# Patient Record
Sex: MALE | Race: WHITE | NOT HISPANIC OR LATINO | Employment: FULL TIME | URBAN - METROPOLITAN AREA
[De-identification: names, ages, dates, MRNs, and addresses within clinical notes are randomized per-mention and may not be internally consistent; named-entity substitution may affect disease eponyms.]

---

## 2018-12-11 ENCOUNTER — TELEPHONE (OUTPATIENT)
Dept: UROLOGY | Facility: CLINIC | Age: 38
End: 2018-12-11

## 2018-12-11 DIAGNOSIS — R35.0 URINARY FREQUENCY: Primary | ICD-10-CM

## 2018-12-11 NOTE — PROGRESS NOTES
"Ordered a urinalysis and urine culture on this patient in case there is any confusion.  No appointment necessary.  Ordered it for tomorrow as 'lab collect".    "

## 2018-12-11 NOTE — TELEPHONE ENCOUNTER
Spoke with pt and instructed him to come to [facility] tomorrow  and submit a urine sample per Dr. Silverio. Voiced understanding

## 2018-12-12 ENCOUNTER — LAB VISIT (OUTPATIENT)
Dept: LAB | Facility: HOSPITAL | Age: 38
End: 2018-12-12
Attending: UROLOGY

## 2018-12-12 DIAGNOSIS — R35.0 URINARY FREQUENCY: ICD-10-CM

## 2018-12-12 LAB
BILIRUB UR QL STRIP: NEGATIVE
CLARITY UR REFRACT.AUTO: CLEAR
COLOR UR AUTO: NORMAL
GLUCOSE UR QL STRIP: NEGATIVE
HGB UR QL STRIP: NEGATIVE
KETONES UR QL STRIP: NEGATIVE
LEUKOCYTE ESTERASE UR QL STRIP: NEGATIVE
NITRITE UR QL STRIP: NEGATIVE
PH UR STRIP: 6 [PH] (ref 5–8)
PROT UR QL STRIP: NEGATIVE
SP GR UR STRIP: 1 (ref 1–1.03)
URN SPEC COLLECT METH UR: NORMAL

## 2018-12-12 PROCEDURE — 87086 URINE CULTURE/COLONY COUNT: CPT

## 2018-12-12 PROCEDURE — 81003 URINALYSIS AUTO W/O SCOPE: CPT

## 2018-12-13 LAB — BACTERIA UR CULT: NO GROWTH

## 2019-12-10 ENCOUNTER — LAB VISIT (OUTPATIENT)
Dept: LAB | Facility: OTHER | Age: 39
End: 2019-12-10
Attending: UROLOGY
Payer: COMMERCIAL

## 2019-12-10 DIAGNOSIS — R31.0 GROSS HEMATURIA: ICD-10-CM

## 2019-12-10 LAB
ANION GAP SERPL CALC-SCNC: 9 MMOL/L (ref 8–16)
BUN SERPL-MCNC: 16 MG/DL (ref 6–20)
CALCIUM SERPL-MCNC: 10 MG/DL (ref 8.7–10.5)
CHLORIDE SERPL-SCNC: 98 MMOL/L (ref 95–110)
CO2 SERPL-SCNC: 32 MMOL/L (ref 23–29)
CREAT SERPL-MCNC: 1.3 MG/DL (ref 0.5–1.4)
EST. GFR  (AFRICAN AMERICAN): >60 ML/MIN/1.73 M^2
EST. GFR  (NON AFRICAN AMERICAN): >60 ML/MIN/1.73 M^2
GLUCOSE SERPL-MCNC: 109 MG/DL (ref 70–110)
POTASSIUM SERPL-SCNC: 3.9 MMOL/L (ref 3.5–5.1)
SODIUM SERPL-SCNC: 139 MMOL/L (ref 136–145)

## 2019-12-10 PROCEDURE — 80048 BASIC METABOLIC PNL TOTAL CA: CPT

## 2019-12-10 PROCEDURE — 36415 COLL VENOUS BLD VENIPUNCTURE: CPT

## 2019-12-10 NOTE — PROGRESS NOTES
Please reach out to this patient and arrange urine culture, cytology, bmp, and CT urogram.  Thank you.  Orders are in.

## 2019-12-11 ENCOUNTER — HOSPITAL ENCOUNTER (OUTPATIENT)
Dept: RADIOLOGY | Facility: OTHER | Age: 39
Discharge: HOME | End: 2019-12-11
Attending: UROLOGY
Payer: COMMERCIAL

## 2019-12-11 DIAGNOSIS — R31.0 GROSS HEMATURIA: ICD-10-CM

## 2019-12-11 PROCEDURE — 74178 CT ABD&PLV WO CNTR FLWD CNTR: CPT | Mod: TC

## 2019-12-11 PROCEDURE — 25500020 PHARM REV CODE 255: Performed by: UROLOGY

## 2019-12-11 PROCEDURE — 74178 CT UROGRAM ABD PELVIS W WO: ICD-10-PCS | Mod: 26,,, | Performed by: RADIOLOGY

## 2019-12-11 PROCEDURE — 74178 CT ABD&PLV WO CNTR FLWD CNTR: CPT | Mod: 26,,, | Performed by: RADIOLOGY

## 2019-12-11 RX ADMIN — IOHEXOL 125 ML: 350 INJECTION, SOLUTION INTRAVENOUS at 03:12

## 2019-12-17 ENCOUNTER — TELEPHONE (OUTPATIENT)
Dept: UROLOGY | Facility: CLINIC | Age: 39
End: 2019-12-17

## 2019-12-17 ENCOUNTER — TELEPHONE (OUTPATIENT)
Dept: UROLOGY | Facility: HOSPITAL | Age: 39
End: 2019-12-17

## 2019-12-17 DIAGNOSIS — R31.0 GROSS HEMATURIA: Primary | ICD-10-CM

## 2019-12-17 NOTE — TELEPHONE ENCOUNTER
attemtped to ontact left message to bring urine specimen to nearest lab and orders were in computer .  Left return clinic number if any questions. tali wills lpn

## 2019-12-17 NOTE — TELEPHONE ENCOUNTER
This patient dropped off a urine specimen in the lab but a urine culture was done without the urine cytology which was ordered.  I'm re-ordering the cytology.  Please arrange with the patient.  THank you.

## 2019-12-19 ENCOUNTER — TELEPHONE (OUTPATIENT)
Dept: UROLOGY | Facility: CLINIC | Age: 39
End: 2019-12-19

## 2019-12-19 NOTE — TELEPHONE ENCOUNTER
----- Message from Paul Fields LPN sent at 12/17/2019 11:50 AM CST -----  MD Jean Claude 1 hour ago (10:40 AM)          This patient dropped off a urine specimen in the lab but a urine culture was done without the urine cytology which was ordered.  I'm re-ordering the cytology.  Please arrange with the patient.  THank you.      Documentation

## 2019-12-19 NOTE — TELEPHONE ENCOUNTER
Spoke with pt instructed to collect urine specimen and bring into lab.  Appears to comprhend instructions.  tali wills lpn

## 2019-12-20 ENCOUNTER — LAB VISIT (OUTPATIENT)
Dept: LAB | Facility: OTHER | Age: 39
End: 2019-12-20
Attending: UROLOGY
Payer: COMMERCIAL

## 2019-12-20 DIAGNOSIS — R31.0 GROSS HEMATURIA: ICD-10-CM

## 2019-12-20 PROCEDURE — 88112 CYTOPATH CELL ENHANCE TECH: CPT | Performed by: PATHOLOGY

## 2019-12-20 PROCEDURE — 88112 CYTOPATH CELL ENHANCE TECH: CPT | Mod: 26,,, | Performed by: PATHOLOGY

## 2019-12-20 PROCEDURE — 88112 PR  CYTOPATH, CELL ENHANCE TECH: ICD-10-PCS | Mod: 26,,, | Performed by: PATHOLOGY

## 2019-12-23 LAB — FINAL PATHOLOGIC DIAGNOSIS: NORMAL

## 2020-01-07 ENCOUNTER — TELEPHONE (OUTPATIENT)
Dept: UROLOGY | Facility: HOSPITAL | Age: 40
End: 2020-01-07

## 2020-01-07 DIAGNOSIS — R31.0 HEMATURIA, GROSS: Primary | ICD-10-CM

## 2020-01-07 RX ORDER — DIAZEPAM 5 MG/1
5 TABLET ORAL ONCE
Qty: 3 TABLET | Refills: 0 | Status: SHIPPED | OUTPATIENT
Start: 2020-01-07 | End: 2020-02-03

## 2020-01-07 NOTE — TELEPHONE ENCOUNTER
"Michael--please schedule Mr. Bae for a cystoscopy on the afternoon of 1/31/20.  This will be in the "resident clinic" procedure block BUT will not be a resident clinic patient.  I will do his cystoscopy.  Ordering valium at patient's request.   "

## 2020-01-17 DIAGNOSIS — Z00.00 ANNUAL PHYSICAL EXAM: Primary | ICD-10-CM

## 2020-01-31 ENCOUNTER — HOSPITAL ENCOUNTER (OUTPATIENT)
Dept: UROLOGY | Facility: HOSPITAL | Age: 40
Discharge: HOME | End: 2020-01-31
Attending: UROLOGY
Payer: COMMERCIAL

## 2020-01-31 VITALS
TEMPERATURE: 100 F | DIASTOLIC BLOOD PRESSURE: 85 MMHG | HEART RATE: 97 BPM | SYSTOLIC BLOOD PRESSURE: 136 MMHG | HEIGHT: 69 IN | RESPIRATION RATE: 18 BRPM | WEIGHT: 171.94 LBS | BODY MASS INDEX: 25.47 KG/M2

## 2020-01-31 DIAGNOSIS — R31.0 HEMATURIA, GROSS: Primary | ICD-10-CM

## 2020-01-31 PROCEDURE — 52000 CYSTOURETHROSCOPY: CPT | Mod: ,,, | Performed by: UROLOGY

## 2020-01-31 PROCEDURE — 52000 CYSTOURETHROSCOPY: CPT

## 2020-01-31 PROCEDURE — 52000 PR CYSTOURETHROSCOPY: ICD-10-PCS | Mod: ,,, | Performed by: UROLOGY

## 2020-01-31 RX ORDER — BUPROPION HYDROCHLORIDE 300 MG/1
300 TABLET ORAL DAILY
COMMUNITY
Start: 2019-12-19

## 2020-01-31 RX ORDER — LIDOCAINE HYDROCHLORIDE 20 MG/ML
JELLY TOPICAL
Status: COMPLETED | OUTPATIENT
Start: 2020-01-31 | End: 2020-01-31

## 2020-01-31 RX ORDER — TESTOSTERONE CYPIONATE 200 MG/ML
150 INJECTION, SOLUTION INTRAMUSCULAR
COMMUNITY

## 2020-01-31 RX ADMIN — LIDOCAINE HYDROCHLORIDE: 20 JELLY TOPICAL at 12:01

## 2020-01-31 NOTE — H&P
[facility]  History & Physical     Subjective:     Chief Complaint/Reason for Admission: hematuria    History of Present Illness:   Patient 39 y.o. male presents with gross hematuria.  Here for cystoscopy.    There are no active problems to display for this patient.         (Not in a hospital admission)  Review of patient's allergies indicates:  No Known Allergies     No past medical history on file.   No past surgical history on file.   No family history on file.   Social History     Tobacco Use    Smoking status: Not on file   Substance Use Topics    Alcohol use: Not on file        Review of Systems  All other systems reviewed and negative except pertinent positives noted in HPI.      Physical Exam   Constitutional: He is oriented to person, place, and time. He appears well-developed and well-nourished. No distress.   HENT:   Head: Normocephalic and atraumatic.   Eyes: No scleral icterus.   Neck: No tracheal deviation present.   Pulmonary/Chest: Effort normal. No respiratory distress.   Neurological: He is alert and oriented to person, place, and time.   Skin: No rash noted.   Psychiatric: He has a normal mood and affect. His behavior is normal. Judgment and thought content normal.   Vitals reviewed.        OBJECTIVE:       Data Review:  CBC: No results for input(s): WBC, RBC, HGB, HCT, PLT, MCV, MCH, MCHC in the last 168 hours.  BMP: No results for input(s): GLU, NA, K, CL, CO2, BUN, CREATININE, CALCIUM, MG in the last 168 hours.  Microbiology Results (last 7 days)     ** No results found for the last 168 hours. **        No results for input(s): COLORU, CLARITYU, SPECGRAV, PHUR, PROTEINUA, GLUCOSEU, BILIRUBINCON, BLOODU, WBCU, RBCU, BACTERIA, MUCUS, NITRITE, LEUKOCYTESUR, UROBILINOGEN, HYALINECASTS in the last 168 hours.    ASSESSMENT/PLAN:     38 yo male with h/o gross hematuria.   Plan:  -I have explained the indication, risks, benefits, and alternatives of the procedure in detail.  The patient  voices understanding and all questions have been answered.  The patient agrees to proceed as planned with cystoscopy.

## 2020-01-31 NOTE — PATIENT INSTRUCTIONS
What to Expect After a Cystoscopy  For the next 24-48 hours, you may feel a mild burning when you urinate. This burning is normal and expected. Drink plenty of water to dilute the urine to help relieve the burning sensation. You may also see a small amount of blood in your urine after the procedure.    Unless you are already taking antibiotics, you may be given an antibiotic after the test to prevent infection.    Signs and Symptoms to Report  Call the [facility] at [phone] if you develop any of the following:  · Fever of 101 degrees or higher  · Chills or persistent bleeding  · Inability to urinate

## 2020-01-31 NOTE — PROCEDURES
Procedures: Flexible cystourethroscopy    Pre Procedure Diagnosis: gross hematuria    Post Procedure Diagnosis:Normal cystoscopy    Surgeon: Jayden Silverio MD    Anesthesia: 2% uro-jet lidocaine jelly for local analgesia    Flexible cysto-urethroscopy was performed after consent was obtained.  The risks and benefits were explained.    2% lidocaine urojet was used for local analgesia.  The genitalia was prepped and draped in the sterile fashion with betadine.    The flexible scope was advanced into the urethra.  No urethral strictures were noted.  The prostate showed No hypertrophy.  There was No median lobe present.  Bilateral ureteral orifices were evaluated and noted to be normal with clear efflux.  The bladder was completely surveyed in a systematic fashion.   No bladder tumors or lesions were seen.      The patient tolerated the procedure well without complication.    They will follow up in 1 year with a microscopic UA.

## 2020-02-03 ENCOUNTER — OFFICE VISIT (OUTPATIENT)
Dept: INTERNAL MEDICINE | Facility: CLINIC | Age: 40
End: 2020-02-03
Payer: COMMERCIAL

## 2020-02-03 ENCOUNTER — HOSPITAL ENCOUNTER (OUTPATIENT)
Dept: RADIOLOGY | Facility: HOSPITAL | Age: 40
Discharge: HOME | End: 2020-02-03
Attending: INTERNAL MEDICINE
Payer: COMMERCIAL

## 2020-02-03 ENCOUNTER — HOSPITAL ENCOUNTER (OUTPATIENT)
Dept: CARDIOLOGY | Facility: CLINIC | Age: 40
Discharge: HOME | End: 2020-02-03
Payer: COMMERCIAL

## 2020-02-03 ENCOUNTER — CLINICAL SUPPORT (OUTPATIENT)
Dept: INTERNAL MEDICINE | Facility: CLINIC | Age: 40
End: 2020-02-03
Payer: COMMERCIAL

## 2020-02-03 DIAGNOSIS — Z00.00 ANNUAL PHYSICAL EXAM: ICD-10-CM

## 2020-02-03 DIAGNOSIS — F32.9 MAJOR DEPRESSION, CHRONIC: Primary | ICD-10-CM

## 2020-02-03 DIAGNOSIS — Z00.00 VISIT FOR ANNUAL HEALTH EXAMINATION: Primary | ICD-10-CM

## 2020-02-03 DIAGNOSIS — R79.89 ELEVATED LFTS: ICD-10-CM

## 2020-02-03 DIAGNOSIS — R93.89 ABNORMAL CXR: ICD-10-CM

## 2020-02-03 DIAGNOSIS — Z00.00 ROUTINE GENERAL MEDICAL EXAMINATION AT A HEALTH CARE FACILITY: Primary | ICD-10-CM

## 2020-02-03 LAB
ALBUMIN SERPL BCP-MCNC: 4.2 G/DL (ref 3.5–5.2)
ALP SERPL-CCNC: 79 U/L (ref 55–135)
ALT SERPL W/O P-5'-P-CCNC: 54 U/L (ref 10–44)
ANION GAP SERPL CALC-SCNC: 11 MMOL/L (ref 8–16)
AST SERPL-CCNC: 92 U/L (ref 10–40)
BILIRUB SERPL-MCNC: 0.2 MG/DL (ref 0.1–1)
BUN SERPL-MCNC: 22 MG/DL (ref 6–20)
CALCIUM SERPL-MCNC: 9.5 MG/DL (ref 8.7–10.5)
CHLORIDE SERPL-SCNC: 102 MMOL/L (ref 95–110)
CHOLEST SERPL-MCNC: 209 MG/DL (ref 120–199)
CHOLEST/HDLC SERPL: 3.4 {RATIO} (ref 2–5)
CO2 SERPL-SCNC: 28 MMOL/L (ref 23–29)
CREAT SERPL-MCNC: 0.9 MG/DL (ref 0.5–1.4)
ERYTHROCYTE [DISTWIDTH] IN BLOOD BY AUTOMATED COUNT: 13.1 % (ref 11.5–14.5)
EST. GFR  (AFRICAN AMERICAN): >60 ML/MIN/1.73 M^2
EST. GFR  (NON AFRICAN AMERICAN): >60 ML/MIN/1.73 M^2
ESTIMATED AVG GLUCOSE: 88 MG/DL (ref 68–131)
GLUCOSE SERPL-MCNC: 88 MG/DL (ref 70–110)
HBA1C MFR BLD HPLC: 4.7 % (ref 4–5.6)
HCT VFR BLD AUTO: 47.8 % (ref 40–54)
HDLC SERPL-MCNC: 61 MG/DL (ref 40–75)
HDLC SERPL: 29.2 % (ref 20–50)
HGB BLD-MCNC: 15.2 G/DL (ref 14–18)
HIV 1+2 AB+HIV1 P24 AG SERPL QL IA: NEGATIVE
LDLC SERPL CALC-MCNC: 133.6 MG/DL (ref 63–159)
MCH RBC QN AUTO: 31.1 PG (ref 27–31)
MCHC RBC AUTO-ENTMCNC: 31.8 G/DL (ref 32–36)
MCV RBC AUTO: 98 FL (ref 82–98)
NONHDLC SERPL-MCNC: 148 MG/DL
PLATELET # BLD AUTO: 261 K/UL (ref 150–350)
PMV BLD AUTO: 9.4 FL (ref 9.2–12.9)
POTASSIUM SERPL-SCNC: 4.4 MMOL/L (ref 3.5–5.1)
PROT SERPL-MCNC: 7.7 G/DL (ref 6–8.4)
RBC # BLD AUTO: 4.89 M/UL (ref 4.6–6.2)
SODIUM SERPL-SCNC: 141 MMOL/L (ref 136–145)
TRIGL SERPL-MCNC: 72 MG/DL (ref 30–150)
WBC # BLD AUTO: 6.29 K/UL (ref 3.9–12.7)

## 2020-02-03 PROCEDURE — 99385 PREV VISIT NEW AGE 18-39: CPT | Mod: S$GLB,,, | Performed by: INTERNAL MEDICINE

## 2020-02-03 PROCEDURE — 80061 LIPID PANEL: CPT

## 2020-02-03 PROCEDURE — 99999 PR PBB SHADOW E&M-EST. PATIENT-LVL III: CPT | Mod: PBBFAC,,, | Performed by: INTERNAL MEDICINE

## 2020-02-03 PROCEDURE — 93005 EKG 12-LEAD: ICD-10-PCS | Mod: S$GLB,,, | Performed by: INTERNAL MEDICINE

## 2020-02-03 PROCEDURE — 99999 PR PBB SHADOW E&M-EST. PATIENT-LVL III: ICD-10-PCS | Mod: PBBFAC,,, | Performed by: INTERNAL MEDICINE

## 2020-02-03 PROCEDURE — 93010 ELECTROCARDIOGRAM REPORT: CPT | Mod: S$GLB,,, | Performed by: INTERNAL MEDICINE

## 2020-02-03 PROCEDURE — 86703 HIV-1/HIV-2 1 RESULT ANTBDY: CPT

## 2020-02-03 PROCEDURE — 71046 X-RAY EXAM CHEST 2 VIEWS: CPT | Mod: TC,FY

## 2020-02-03 PROCEDURE — 36415 COLL VENOUS BLD VENIPUNCTURE: CPT

## 2020-02-03 PROCEDURE — 93005 ELECTROCARDIOGRAM TRACING: CPT | Mod: S$GLB,,, | Performed by: INTERNAL MEDICINE

## 2020-02-03 PROCEDURE — 85027 COMPLETE CBC AUTOMATED: CPT

## 2020-02-03 PROCEDURE — 93010 EKG 12-LEAD: ICD-10-PCS | Mod: S$GLB,,, | Performed by: INTERNAL MEDICINE

## 2020-02-03 PROCEDURE — 83036 HEMOGLOBIN GLYCOSYLATED A1C: CPT

## 2020-02-03 PROCEDURE — 80053 COMPREHEN METABOLIC PANEL: CPT

## 2020-02-03 PROCEDURE — 71046 X-RAY EXAM CHEST 2 VIEWS: CPT | Mod: 26,,, | Performed by: RADIOLOGY

## 2020-02-03 PROCEDURE — 71046 XR CHEST PA AND LATERAL: ICD-10-PCS | Mod: 26,,, | Performed by: RADIOLOGY

## 2020-02-03 PROCEDURE — 99385 PR PREVENTIVE VISIT,NEW,18-39: ICD-10-PCS | Mod: S$GLB,,, | Performed by: INTERNAL MEDICINE

## 2020-02-03 NOTE — LETTER
"February 6, 2020    Jorge Bae III  [address]  [facility] [address]             [facility]  [address]  [facility] [address]  Phone: [phone]  Fax: [phone] Dear Mr. Bae:    Thank you for allowing me to serve you and perform your Executive Health exam on 2/3/2020.  This letter will serve a brief summary of the physical findings, and laboratory/studies performed and recommendations at that time.    Reason for Visit: Executive Health Preventive Physical Examination    /85   Pulse 100   Ht 5' 9" (1.753 m)   Wt 80 kg (176 lb 5.9 oz)   SpO2 97%   BMI 26.05 kg/m²     Labs:  Results for orders placed or performed in visit on 02/03/20   Comprehensive metabolic panel   Result Value Ref Range    Sodium 141 136 - 145 mmol/L    Potassium 4.4 3.5 - 5.1 mmol/L    Chloride 102 95 - 110 mmol/L    CO2 28 23 - 29 mmol/L    Glucose 88 70 - 110 mg/dL    BUN, Bld 22 (H) 6 - 20 mg/dL    Creatinine 0.9 0.5 - 1.4 mg/dL    Calcium 9.5 8.7 - 10.5 mg/dL    Total Protein 7.7 6.0 - 8.4 g/dL    Albumin 4.2 3.5 - 5.2 g/dL    Total Bilirubin 0.2 0.1 - 1.0 mg/dL    Alkaline Phosphatase 79 55 - 135 U/L    AST 92 (H) 10 - 40 U/L    ALT 54 (H) 10 - 44 U/L    Anion Gap 11 8 - 16 mmol/L    eGFR if African American >60.0 >60 mL/min/1.73 m^2    eGFR if non African American >60.0 >60 mL/min/1.73 m^2   CBC Without Differential   Result Value Ref Range    WBC 6.29 3.90 - 12.70 K/uL    RBC 4.89 4.60 - 6.20 M/uL    Hemoglobin 15.2 14.0 - 18.0 g/dL    Hematocrit 47.8 40.0 - 54.0 %    Mean Corpuscular Volume 98 82 - 98 fL    Mean Corpuscular Hemoglobin 31.1 (H) 27.0 - 31.0 pg    Mean Corpuscular Hemoglobin Conc 31.8 (L) 32.0 - 36.0 g/dL    RDW 13.1 11.5 - 14.5 %    Platelets 261 150 - 350 K/uL    MPV 9.4 9.2 - 12.9 fL   Lipid panel   Result Value Ref Range    Cholesterol 209 (H) 120 - 199 mg/dL    Triglycerides 72 30 - 150 mg/dL    HDL 61 40 - 75 mg/dL    LDL Cholesterol 133.6 63.0 - 159.0 mg/dL "    Hdl/Cholesterol Ratio 29.2 20.0 - 50.0 %    Total Cholesterol/HDL Ratio 3.4 2.0 - 5.0    Non-HDL Cholesterol 148 mg/dL   Hemoglobin A1c   Result Value Ref Range    Hemoglobin A1C 4.7 4.0 - 5.6 %    Estimated Avg Glucose 88 68 - 131 mg/dL   HIV 1/2 Ag/Ab (4th Gen)   Result Value Ref Range    HIV 1/2 Ag/Ab Negative Negative        Assessment/Recommendations:  Routine Health Maintenance  Flu and Tdap vaccinations are up-to-date at outside facility per history    At this time, you appear to be in good medical condition.  Your blood pressures were initially elevated on today's visit but improved on repeat manual check, so we will have a close follow-up with you in 3 months to reassess.  Your liver function tests are elevated as discussed at the appointment and will repeat labs in a month.  Your chest x-ray showed some non-specific findings, and I would like to repeat the chest x-ray in one month to reassess.  I look forward to seeing you again at your close follow-up appointment.  Please contact me should you have any questions or concerns regarding physical findings, or my recommendations.    If you have any questions or concerns, please don't hesitate to call.    Sincerely,        Debi Crockett MD

## 2020-02-06 VITALS
DIASTOLIC BLOOD PRESSURE: 85 MMHG | OXYGEN SATURATION: 97 % | HEART RATE: 100 BPM | WEIGHT: 176.38 LBS | BODY MASS INDEX: 26.12 KG/M2 | SYSTOLIC BLOOD PRESSURE: 135 MMHG | HEIGHT: 69 IN

## 2020-02-06 PROBLEM — R79.89 ELEVATED LFTS: Status: ACTIVE | Noted: 2020-02-06

## 2020-02-06 PROBLEM — Z78.9 ALCOHOL USE: Status: ACTIVE | Noted: 2020-02-06

## 2020-02-06 PROBLEM — R93.89 ABNORMAL CXR: Status: ACTIVE | Noted: 2020-02-06

## 2020-02-06 PROBLEM — E34.9 TESTOSTERONE DEFICIENCY: Status: ACTIVE | Noted: 2020-02-06

## 2020-02-06 PROBLEM — F10.90 ALCOHOL USE: Status: ACTIVE | Noted: 2020-02-06

## 2020-02-06 PROBLEM — F32.9 MAJOR DEPRESSION, CHRONIC: Status: ACTIVE | Noted: 2020-02-06

## 2020-07-28 DIAGNOSIS — Z00.6 RESEARCH SUBJECT: ICD-10-CM

## 2020-07-28 DIAGNOSIS — Z00.00 PHYSICAL EXAM, ROUTINE: ICD-10-CM

## 2020-07-28 DIAGNOSIS — Z01.84 ENCOUNTER FOR ANTIBODY RESPONSE EXAMINATION: ICD-10-CM

## 2020-07-28 DIAGNOSIS — Z00.6 RESEARCH SUBJECT: Primary | ICD-10-CM

## 2020-10-04 ENCOUNTER — LAB VISIT (OUTPATIENT)
Dept: URGENT CARE | Facility: CLINIC | Age: 40
End: 2020-10-04
Payer: COMMERCIAL

## 2020-10-04 VITALS — OXYGEN SATURATION: 97 % | TEMPERATURE: 98 F | HEART RATE: 73 BPM

## 2020-10-04 DIAGNOSIS — Z11.59 ENCOUNTER FOR SCREENING FOR OTHER VIRAL DISEASES: ICD-10-CM

## 2020-10-04 LAB — SARS-COV-2 RNA RESP QL NAA+PROBE: NOT DETECTED

## 2020-10-04 PROCEDURE — U0003 INFECTIOUS AGENT DETECTION BY NUCLEIC ACID (DNA OR RNA); SEVERE ACUTE RESPIRATORY SYNDROME CORONAVIRUS 2 (SARS-COV-2) (CORONAVIRUS DISEASE [COVID-19]), AMPLIFIED PROBE TECHNIQUE, MAKING USE OF HIGH THROUGHPUT TECHNOLOGIES AS DESCRIBED BY CMS-2020-01-R: HCPCS

## 2020-11-02 ENCOUNTER — OFFICE VISIT (OUTPATIENT)
Dept: INTERNAL MEDICINE | Facility: CLINIC | Age: 40
End: 2020-11-02
Payer: COMMERCIAL

## 2020-11-02 ENCOUNTER — HOSPITAL ENCOUNTER (OUTPATIENT)
Dept: RADIOLOGY | Facility: HOSPITAL | Age: 40
Discharge: HOME | End: 2020-11-02
Attending: INTERNAL MEDICINE
Payer: COMMERCIAL

## 2020-11-02 VITALS
HEIGHT: 69 IN | SYSTOLIC BLOOD PRESSURE: 120 MMHG | DIASTOLIC BLOOD PRESSURE: 70 MMHG | OXYGEN SATURATION: 96 % | HEART RATE: 76 BPM | BODY MASS INDEX: 25.34 KG/M2 | WEIGHT: 171.06 LBS

## 2020-11-02 DIAGNOSIS — R93.89 ABNORMAL CXR: ICD-10-CM

## 2020-11-02 DIAGNOSIS — R79.89 ELEVATED LFTS: ICD-10-CM

## 2020-11-02 DIAGNOSIS — F32.9 MAJOR DEPRESSION, CHRONIC: ICD-10-CM

## 2020-11-02 DIAGNOSIS — R10.13 EPIGASTRIC ABDOMINAL PAIN: Primary | ICD-10-CM

## 2020-11-02 PROCEDURE — 3008F PR BODY MASS INDEX (BMI) DOCUMENTED: ICD-10-PCS | Mod: CPTII,S$GLB,, | Performed by: INTERNAL MEDICINE

## 2020-11-02 PROCEDURE — 71046 XR CHEST PA AND LATERAL: ICD-10-PCS | Mod: 26,,, | Performed by: RADIOLOGY

## 2020-11-02 PROCEDURE — 71046 X-RAY EXAM CHEST 2 VIEWS: CPT | Mod: TC

## 2020-11-02 PROCEDURE — 99214 PR OFFICE/OUTPT VISIT, EST, LEVL IV, 30-39 MIN: ICD-10-PCS | Mod: S$GLB,,, | Performed by: INTERNAL MEDICINE

## 2020-11-02 PROCEDURE — 71046 X-RAY EXAM CHEST 2 VIEWS: CPT | Mod: 26,,, | Performed by: RADIOLOGY

## 2020-11-02 PROCEDURE — 99214 OFFICE O/P EST MOD 30 MIN: CPT | Mod: S$GLB,,, | Performed by: INTERNAL MEDICINE

## 2020-11-02 PROCEDURE — 99999 PR PBB SHADOW E&M-EST. PATIENT-LVL IV: CPT | Mod: PBBFAC,,, | Performed by: INTERNAL MEDICINE

## 2020-11-02 PROCEDURE — 3008F BODY MASS INDEX DOCD: CPT | Mod: CPTII,S$GLB,, | Performed by: INTERNAL MEDICINE

## 2020-11-02 PROCEDURE — 99999 PR PBB SHADOW E&M-EST. PATIENT-LVL IV: ICD-10-PCS | Mod: PBBFAC,,, | Performed by: INTERNAL MEDICINE

## 2020-11-02 RX ORDER — PANTOPRAZOLE SODIUM 40 MG/1
40 TABLET, DELAYED RELEASE ORAL 2 TIMES DAILY
Qty: 60 TABLET | Refills: 2 | Status: SHIPPED | OUTPATIENT
Start: 2020-11-02 | End: 2022-02-07

## 2020-11-02 RX ORDER — DOXYCYCLINE HYCLATE 100 MG
50 TABLET ORAL DAILY
COMMUNITY
Start: 2020-08-27 | End: 2020-11-02

## 2020-11-02 RX ORDER — DOXYCYCLINE HYCLATE 50 MG/1
50 CAPSULE ORAL DAILY
COMMUNITY
End: 2021-01-08

## 2020-11-11 ENCOUNTER — HOSPITAL ENCOUNTER (OUTPATIENT)
Facility: HOSPITAL | Age: 40
Discharge: HOME | End: 2020-11-11
Attending: INTERNAL MEDICINE | Admitting: INTERNAL MEDICINE
Payer: COMMERCIAL

## 2020-11-11 ENCOUNTER — ANESTHESIA EVENT (OUTPATIENT)
Dept: ENDOSCOPY | Facility: HOSPITAL | Age: 40
End: 2020-11-11
Payer: COMMERCIAL

## 2020-11-11 ENCOUNTER — ANESTHESIA (OUTPATIENT)
Dept: ENDOSCOPY | Facility: HOSPITAL | Age: 40
End: 2020-11-11
Payer: COMMERCIAL

## 2020-11-11 VITALS
OXYGEN SATURATION: 94 % | DIASTOLIC BLOOD PRESSURE: 80 MMHG | SYSTOLIC BLOOD PRESSURE: 111 MMHG | WEIGHT: 163 LBS | TEMPERATURE: 98 F | HEART RATE: 77 BPM | BODY MASS INDEX: 23.34 KG/M2 | RESPIRATION RATE: 18 BRPM | HEIGHT: 70 IN

## 2020-11-11 DIAGNOSIS — R10.9 ABDOMINAL PAIN, UNSPECIFIED ABDOMINAL LOCATION: Primary | ICD-10-CM

## 2020-11-11 DIAGNOSIS — R10.13 EPIGASTRIC PAIN: Primary | ICD-10-CM

## 2020-11-11 LAB — SARS-COV-2 RDRP RESP QL NAA+PROBE: NEGATIVE

## 2020-11-11 PROCEDURE — 63600175 PHARM REV CODE 636 W HCPCS: Performed by: NURSE ANESTHETIST, CERTIFIED REGISTERED

## 2020-11-11 PROCEDURE — 43239 EGD BIOPSY SINGLE/MULTIPLE: CPT | Mod: ,,, | Performed by: INTERNAL MEDICINE

## 2020-11-11 PROCEDURE — 25000003 PHARM REV CODE 250: Performed by: NURSE ANESTHETIST, CERTIFIED REGISTERED

## 2020-11-11 PROCEDURE — 88305 TISSUE EXAM BY PATHOLOGIST: CPT | Mod: 26,,, | Performed by: PATHOLOGY

## 2020-11-11 PROCEDURE — 37000008 HC ANESTHESIA 1ST 15 MINUTES: Performed by: INTERNAL MEDICINE

## 2020-11-11 PROCEDURE — 27201012 HC FORCEPS, HOT/COLD, DISP: Performed by: INTERNAL MEDICINE

## 2020-11-11 PROCEDURE — U0002 COVID-19 LAB TEST NON-CDC: HCPCS

## 2020-11-11 PROCEDURE — 43239 EGD BIOPSY SINGLE/MULTIPLE: CPT | Performed by: INTERNAL MEDICINE

## 2020-11-11 PROCEDURE — 43239 PR EGD, FLEX, W/BIOPSY, SGL/MULTI: ICD-10-PCS | Mod: ,,, | Performed by: INTERNAL MEDICINE

## 2020-11-11 PROCEDURE — 88305 TISSUE EXAM BY PATHOLOGIST: CPT | Mod: 59 | Performed by: PATHOLOGY

## 2020-11-11 PROCEDURE — 88305 TISSUE EXAM BY PATHOLOGIST: ICD-10-PCS | Mod: 26,,, | Performed by: PATHOLOGY

## 2020-11-11 PROCEDURE — 37000009 HC ANESTHESIA EA ADD 15 MINS: Performed by: INTERNAL MEDICINE

## 2020-11-11 RX ORDER — FENTANYL CITRATE 50 UG/ML
INJECTION, SOLUTION INTRAMUSCULAR; INTRAVENOUS
Status: DISCONTINUED | OUTPATIENT
Start: 2020-11-11 | End: 2020-11-11

## 2020-11-11 RX ORDER — MIDAZOLAM HYDROCHLORIDE 1 MG/ML
INJECTION, SOLUTION INTRAMUSCULAR; INTRAVENOUS
Status: DISCONTINUED | OUTPATIENT
Start: 2020-11-11 | End: 2020-11-11

## 2020-11-11 RX ORDER — SODIUM CHLORIDE 9 MG/ML
INJECTION, SOLUTION INTRAVENOUS CONTINUOUS PRN
Status: DISCONTINUED | OUTPATIENT
Start: 2020-11-11 | End: 2020-11-11

## 2020-11-11 RX ORDER — PROPOFOL 10 MG/ML
VIAL (ML) INTRAVENOUS
Status: DISCONTINUED | OUTPATIENT
Start: 2020-11-11 | End: 2020-11-11

## 2020-11-11 RX ORDER — PROPOFOL 10 MG/ML
VIAL (ML) INTRAVENOUS CONTINUOUS PRN
Status: DISCONTINUED | OUTPATIENT
Start: 2020-11-11 | End: 2020-11-11

## 2020-11-11 RX ADMIN — FENTANYL CITRATE 25 MCG: 50 INJECTION, SOLUTION INTRAMUSCULAR; INTRAVENOUS at 11:11

## 2020-11-11 RX ADMIN — FENTANYL CITRATE 50 MCG: 50 INJECTION, SOLUTION INTRAMUSCULAR; INTRAVENOUS at 11:11

## 2020-11-11 RX ADMIN — FENTANYL CITRATE 25 MCG: 50 INJECTION, SOLUTION INTRAMUSCULAR; INTRAVENOUS at 12:11

## 2020-11-11 RX ADMIN — MIDAZOLAM 2 MG: 1 INJECTION INTRAMUSCULAR; INTRAVENOUS at 11:11

## 2020-11-11 RX ADMIN — PROPOFOL 50 MG: 10 INJECTION, EMULSION INTRAVENOUS at 11:11

## 2020-11-11 RX ADMIN — SODIUM CHLORIDE: 0.9 INJECTION, SOLUTION INTRAVENOUS at 11:11

## 2020-11-11 RX ADMIN — TOPICAL ANESTHETIC 1 EACH: 200 SPRAY DENTAL; PERIODONTAL at 11:11

## 2020-11-11 RX ADMIN — PROPOFOL 150 MCG/KG/MIN: 10 INJECTION, EMULSION INTRAVENOUS at 11:11

## 2020-11-11 NOTE — ANESTHESIA POSTPROCEDURE EVALUATION
Anesthesia Post Evaluation    Patient: Jorge Bae III    Procedure(s) Performed: Procedure(s) (LRB):  EGD (ESOPHAGOGASTRODUODENOSCOPY) (N/A)    Final Anesthesia Type: MAC    Patient location during evaluation: GI PACU  Patient participation: Yes- Able to Participate  Level of consciousness: awake and alert  Post-procedure vital signs: reviewed and stable  Pain management: adequate  Airway patency: patent  ITZEL mitigation strategies: Multimodal analgesia  PONV status at discharge: No PONV  Anesthetic complications: no      Cardiovascular status: hemodynamically stable and blood pressure returned to baseline  Respiratory status: room air, unassisted and spontaneous ventilation  Hydration status: euvolemic  Follow-up not needed.          Vitals Value Taken Time   /80 11/11/20 1243   Temp 36.9 °C (98.4 °F) 11/11/20 1215   Pulse 77 11/11/20 1243   Resp 18 11/11/20 1243   SpO2 94 % 11/11/20 1243         Event Time   Out of Recovery 12:52:30         Pain/Henry Score: Henry Score: 10 (11/11/2020 12:43 PM)

## 2020-11-11 NOTE — ANESTHESIA PREPROCEDURE EVALUATION
11/11/2020  Jorge Bae III is a 40 y.o., male for EGD under MAC    Past Medical History:   Diagnosis Date    Depression     Hematuria     Testosterone deficiency      Past Surgical History:   Procedure Laterality Date    deviated septum repair           Anesthesia Evaluation    I have reviewed the Patient Summary Reports.   I have reviewed the NPO Status.   I have reviewed the Medications.     Review of Systems  Anesthesia Hx:   Denies Personal Hx of Anesthesia complications.   Social:  Non-Smoker    Cardiovascular:  Cardiovascular Normal     Pulmonary:  Pulmonary Normal        Physical Exam  General:  Well nourished    Airway/Jaw/Neck:  Airway Findings: Mallampati: II      Chest/Lungs:  Chest/Lungs Clear    Heart/Vascular:  Heart Findings: Normal            Anesthesia Plan  Type of Anesthesia, risks & benefits discussed:  Anesthesia Type:  MAC  Patient's Preference:   Intra-op Monitoring Plan: standard ASA monitors  Intra-op Monitoring Plan Comments:   Post Op Pain Control Plan: multimodal analgesia  Post Op Pain Control Plan Comments:   Induction:    Beta Blocker:  Patient is not currently on a Beta-Blocker (No further documentation required).       Informed Consent: Patient understands risks and agrees with Anesthesia plan.  Questions answered. Anesthesia consent signed with patient.  ASA Score: 1     Day of Surgery Review of History & Physical:            Ready For Surgery From Anesthesia Perspective.

## 2020-11-11 NOTE — [employer] PATIENT INSTRUCTIONS
Discharge Summary/Instructions after an Endoscopic Procedure  Patient Name: Jorge Bae  Patient MRN: [id number]  Patient YOB: 1980 Wednesday, November 11, 2020  Yunior Stevens MD  Your health is very important to us during the Covid Crisis. Following your   procedure today, you will receive a daily text for 2 weeks asking about   signs or symptoms of Covid 19.  Please respond to this text when you   receive it so we can follow up and keep you as safe as possible.   RESTRICTIONS:  During your procedure today, you received medications for sedation.  These   medications may affect your judgment, balance and coordination.  Therefore,   for 24 hours, you have the following restrictions:   - DO NOT drive a car, operate machinery, make legal/financial decisions,   sign important papers or drink alcohol.    ACTIVITY:  Today: no heavy lifting, straining or running due to procedural   sedation/anesthesia.  The following day: return to full activity including work.  DIET:  Eat and drink normally unless instructed otherwise.     TREATMENT FOR COMMON SIDE EFFECTS:  - Mild abdominal pain, nausea, belching, bloating or excessive gas:  rest,   eat lightly and use a heating pad.  - Sore Throat: treat with throat lozenges and/or gargle with warm salt   water.  - Because air was used during the procedure, expelling large amounts of air   from your rectum or belching is normal.  - If a bowel prep was taken, you may not have a bowel movement for 1-3 days.    This is normal.  SYMPTOMS TO WATCH FOR AND REPORT TO YOUR PHYSICIAN:  1. Abdominal pain or bloating, other than gas cramps.  2. Chest pain.  3. Back pain.  4. Signs of infection such as: chills or fever occurring within 24 hours   after the procedure.  5. Rectal bleeding, which would show as bright red, maroon, or black stools.   (A tablespoon of blood from the rectum is not serious, especially if   hemorrhoids are present.)  6. Vomiting.  7. Weakness or  dizziness.  GO DIRECTLY TO THE NEAREST EMERGENCY ROOM IF YOU HAVE ANY OF THE FOLLOWING:      Difficulty breathing              Chills and/or fever over 101 F   Persistent vomiting and/or vomiting blood   Severe abdominal pain   Severe chest pain   Black, tarry stools   Bleeding- more than one tablespoon   Any other symptom or condition that you feel may need urgent attention  Your doctor recommends these additional instructions:  If any biopsies were taken, your doctors clinic will contact you in 1 to 2   weeks with any results.  - Discharge patient to home.   - Patient has a contact number available for emergencies.  The signs and   symptoms of potential delayed complications were discussed with the   patient.  Return to normal activities tomorrow.  Written discharge   instructions were provided to the patient.   - Resume previous diet.   - Continue present medications.   - Await pathology results.   - No aspirin, ibuprofen, naproxen, or other non-steroidal anti-inflammatory   drugs.  For questions, problems or results please call your physician - Yunior Stevens MD.  EMERGENCY PHONE NUMBER: [phone],  LAB RESULTS: [phone]  IF A COMPLICATION OR EMERGENCY SITUATION ARISES AND YOU ARE UNABLE TO REACH   YOUR PHYSICIAN - GO DIRECTLY TO THE EMERGENCY ROOM.  Yunior Stevens MD  11/11/2020 12:11:15 PM  This report has been verified and signed electronically.  [employer]

## 2020-11-11 NOTE — TRANSFER OF CARE
"Anesthesia Transfer of Care Note    Patient: Jorge Bae III    Procedure(s) Performed: Procedure(s) (LRB):  EGD (ESOPHAGOGASTRODUODENOSCOPY) (N/A)    Patient location: GI    Anesthesia Type: general    Transport from OR: Transported from OR on room air with adequate spontaneous ventilation    Post pain: adequate analgesia    Post assessment: no apparent anesthetic complications and tolerated procedure well    Post vital signs: stable    Level of consciousness: alert, oriented and awake    Nausea/Vomiting: no nausea/vomiting    Complications: none    Transfer of care protocol was followed      Last vitals:   Visit Vitals  /72   Pulse 70   Temp 36.6 °C (97.8 °F)   Resp 18   Ht 5' 10" (1.778 m)   Wt 73.9 kg (163 lb)   SpO2 (!) 94%   BMI 23.39 kg/m²     "

## 2020-11-11 NOTE — H&P
Short Stay Endoscopy History and Physical    PCP - Debi Crockett MD     Procedure - EGD  ASA - per anesthesia  Mallampati - per anesthesia  History of Anesthesia problems - no  Family history Anesthesia problems -  no   Plan of anesthesia - General    HPI:  This is a 40 y.o. male here for evaluation of :     Here today for add on EGD for dyspepsia, epigastric pain.  Hx of recent NSAID use daily x 1 month, also recently was on doxy and has reecnt etoh use but has cut back over past 2 weeks.  Pain is nonradiating. Assoc bloating.    ROS:  Constitutional: No fevers, chills, No weight loss  CV: No chest pain  Pulm: No cough, No shortness of breath  Ophtho: No vision changes  GI: see HPI  Derm: No rash    Medical History:  has a past medical history of Depression, Hematuria, and Testosterone deficiency.    Surgical History:  has a past surgical history that includes deviated septum repair.    Family History: family history includes Alzheimer's disease in his mother; Heart disease in his maternal grandfather; No Known Problems in his father.. Otherwise no colon cancer, inflammatory bowel disease, or GI malignancies.    Social History:  reports that he has never smoked. He has never used smokeless tobacco. He reports current alcohol use. He reports previous drug use. Drug: Amphetamines.    Review of patient's allergies indicates:  No Known Allergies    Medications:   Medications Prior to Admission   Medication Sig Dispense Refill Last Dose    buPROPion (WELLBUTRIN XL) 300 MG 24 hr tablet Take 300 mg by mouth once daily.   11/10/2020 at Unknown time    doxycycline (VIBRAMYCIN) 50 MG capsule Take 50 mg by mouth once daily.   11/10/2020 at Unknown time    pantoprazole (PROTONIX) 40 MG tablet Take 1 tablet (40 mg total) by mouth 2 (two) times daily. 60 tablet 2 11/10/2020 at Unknown time    testosterone cypionate (DEPOTESTOTERONE CYPIONATE) 200 mg/mL injection Inject 150 mg into the muscle every 7 days.           Physical Exam:    Vital Signs:   Vitals:    11/11/20 1001   BP: 139/72   Pulse: 70   Resp: 18   Temp: 97.8 °F (36.6 °C)       General Appearance: Well appearing in no acute distress  Eyes:    No scleral icterus  ENT: Neck supple, Lips, mucosa, and tongue normal; teeth and gums normal  Lungs: CTA anteriorly  Heart:  Regular rate, S1, S2 normal, no murmurs heard.  Abdomen: Soft, non tender, non distended with normal bowel sounds. No hepatosplenomegaly, ascites, or mass.  Extremities: No edema  Skin: No rash    Labs:  Lab Results   Component Value Date    WBC 6.29 02/03/2020    HGB 15.2 02/03/2020    HCT 47.8 02/03/2020     02/03/2020    CHOL 209 (H) 02/03/2020    TRIG 72 02/03/2020    HDL 61 02/03/2020    ALT 54 (H) 02/03/2020    AST 92 (H) 02/03/2020     02/03/2020    K 4.4 02/03/2020     02/03/2020    CREATININE 0.9 02/03/2020    BUN 22 (H) 02/03/2020    CO2 28 02/03/2020    HGBA1C 4.7 02/03/2020       I have explained the risks and benefits of endoscopy procedures to the patient including but not limited to bleeding, perforation, infection, and death.  The patient was asked if they understand and allowed to ask any further questions to their satisfaction.      Yunior Stevens MD

## 2020-11-13 LAB
FINAL PATHOLOGIC DIAGNOSIS: NORMAL
GROSS: NORMAL
Lab: NORMAL

## 2020-11-18 ENCOUNTER — PATIENT MESSAGE (OUTPATIENT)
Dept: INTERNAL MEDICINE | Facility: CLINIC | Age: 40
End: 2020-11-18

## 2020-11-18 DIAGNOSIS — Z20.822 EXPOSURE TO COVID-19 VIRUS: Primary | ICD-10-CM

## 2020-11-19 ENCOUNTER — PATIENT MESSAGE (OUTPATIENT)
Dept: INTERNAL MEDICINE | Facility: CLINIC | Age: 40
End: 2020-11-19

## 2020-11-20 ENCOUNTER — LAB VISIT (OUTPATIENT)
Dept: LAB | Facility: HOSPITAL | Age: 40
End: 2020-11-20
Attending: INTERNAL MEDICINE
Payer: COMMERCIAL

## 2020-11-20 ENCOUNTER — PATIENT MESSAGE (OUTPATIENT)
Dept: INTERNAL MEDICINE | Facility: CLINIC | Age: 40
End: 2020-11-20

## 2020-11-20 DIAGNOSIS — R10.13 EPIGASTRIC ABDOMINAL PAIN: ICD-10-CM

## 2020-11-20 DIAGNOSIS — F32.9 MAJOR DEPRESSION, CHRONIC: ICD-10-CM

## 2020-11-20 DIAGNOSIS — Z20.822 EXPOSURE TO COVID-19 VIRUS: ICD-10-CM

## 2020-11-20 DIAGNOSIS — R79.89 ELEVATED LFTS: ICD-10-CM

## 2020-11-20 LAB
ALBUMIN SERPL BCP-MCNC: 4.3 G/DL (ref 3.5–5.2)
ALP SERPL-CCNC: 82 U/L (ref 55–135)
ALT SERPL W/O P-5'-P-CCNC: 49 U/L (ref 10–44)
ANION GAP SERPL CALC-SCNC: 7 MMOL/L (ref 8–16)
AST SERPL-CCNC: 54 U/L (ref 10–40)
BASOPHILS # BLD AUTO: 0.05 K/UL (ref 0–0.2)
BASOPHILS NFR BLD: 0.8 % (ref 0–1.9)
BILIRUB SERPL-MCNC: 0.7 MG/DL (ref 0.1–1)
BUN SERPL-MCNC: 20 MG/DL (ref 6–20)
CALCIUM SERPL-MCNC: 9.3 MG/DL (ref 8.7–10.5)
CHLORIDE SERPL-SCNC: 103 MMOL/L (ref 95–110)
CO2 SERPL-SCNC: 30 MMOL/L (ref 23–29)
CREAT SERPL-MCNC: 1 MG/DL (ref 0.5–1.4)
DIFFERENTIAL METHOD: ABNORMAL
EOSINOPHIL # BLD AUTO: 0.1 K/UL (ref 0–0.5)
EOSINOPHIL NFR BLD: 1.7 % (ref 0–8)
ERYTHROCYTE [DISTWIDTH] IN BLOOD BY AUTOMATED COUNT: 12.9 % (ref 11.5–14.5)
EST. GFR  (AFRICAN AMERICAN): >60 ML/MIN/1.73 M^2
EST. GFR  (NON AFRICAN AMERICAN): >60 ML/MIN/1.73 M^2
GLUCOSE SERPL-MCNC: 97 MG/DL (ref 70–110)
HCT VFR BLD AUTO: 52.5 % (ref 40–54)
HGB BLD-MCNC: 16.3 G/DL (ref 14–18)
IMM GRANULOCYTES # BLD AUTO: 0.02 K/UL (ref 0–0.04)
IMM GRANULOCYTES NFR BLD AUTO: 0.3 % (ref 0–0.5)
LIPASE SERPL-CCNC: 29 U/L (ref 4–60)
LYMPHOCYTES # BLD AUTO: 1.5 K/UL (ref 1–4.8)
LYMPHOCYTES NFR BLD: 22.8 % (ref 18–48)
MCH RBC QN AUTO: 30.1 PG (ref 27–31)
MCHC RBC AUTO-ENTMCNC: 31 G/DL (ref 32–36)
MCV RBC AUTO: 97 FL (ref 82–98)
MONOCYTES # BLD AUTO: 0.5 K/UL (ref 0.3–1)
MONOCYTES NFR BLD: 7.9 % (ref 4–15)
NEUTROPHILS # BLD AUTO: 4.3 K/UL (ref 1.8–7.7)
NEUTROPHILS NFR BLD: 66.5 % (ref 38–73)
NRBC BLD-RTO: 0 /100 WBC
PLATELET # BLD AUTO: 272 K/UL (ref 150–350)
PMV BLD AUTO: 9.9 FL (ref 9.2–12.9)
POTASSIUM SERPL-SCNC: 5 MMOL/L (ref 3.5–5.1)
PROT SERPL-MCNC: 7.5 G/DL (ref 6–8.4)
RBC # BLD AUTO: 5.42 M/UL (ref 4.6–6.2)
SARS-COV-2 IGG SERPLBLD QL IA.RAPID: NEGATIVE
SODIUM SERPL-SCNC: 140 MMOL/L (ref 136–145)
T4 FREE SERPL-MCNC: 0.95 NG/DL (ref 0.71–1.51)
TSH SERPL DL<=0.005 MIU/L-ACNC: 1.98 UIU/ML (ref 0.4–4)
WBC # BLD AUTO: 6.46 K/UL (ref 3.9–12.7)

## 2020-11-20 PROCEDURE — 36415 COLL VENOUS BLD VENIPUNCTURE: CPT

## 2020-11-20 PROCEDURE — 83690 ASSAY OF LIPASE: CPT

## 2020-11-20 PROCEDURE — 84443 ASSAY THYROID STIM HORMONE: CPT

## 2020-11-20 PROCEDURE — 80053 COMPREHEN METABOLIC PANEL: CPT

## 2020-11-20 PROCEDURE — 85025 COMPLETE CBC W/AUTO DIFF WBC: CPT

## 2020-11-20 PROCEDURE — 86769 SARS-COV-2 COVID-19 ANTIBODY: CPT

## 2020-11-20 PROCEDURE — 84439 ASSAY OF FREE THYROXINE: CPT

## 2020-11-20 PROCEDURE — 80074 ACUTE HEPATITIS PANEL: CPT

## 2020-11-23 LAB
HAV IGM SERPL QL IA: NEGATIVE
HBV CORE IGM SERPL QL IA: NEGATIVE
HBV SURFACE AG SERPL QL IA: NEGATIVE
HCV AB SERPL QL IA: NEGATIVE

## 2021-01-02 ENCOUNTER — PATIENT OUTREACH (OUTPATIENT)
Dept: ADMINISTRATIVE | Facility: OTHER | Age: 41
End: 2021-01-02

## 2021-01-08 ENCOUNTER — OFFICE VISIT (OUTPATIENT)
Dept: SPINE | Facility: CLINIC | Age: 41
End: 2021-01-08
Attending: PHYSICAL MEDICINE & REHABILITATION
Payer: COMMERCIAL

## 2021-01-08 VITALS
DIASTOLIC BLOOD PRESSURE: 70 MMHG | SYSTOLIC BLOOD PRESSURE: 132 MMHG | HEART RATE: 68 BPM | BODY MASS INDEX: 23.33 KG/M2 | HEIGHT: 70 IN | WEIGHT: 162.94 LBS

## 2021-01-08 DIAGNOSIS — G89.29 CHRONIC BILATERAL LOW BACK PAIN WITHOUT SCIATICA: Primary | ICD-10-CM

## 2021-01-08 DIAGNOSIS — M54.50 CHRONIC BILATERAL LOW BACK PAIN WITHOUT SCIATICA: Primary | ICD-10-CM

## 2021-01-08 PROCEDURE — 1125F AMNT PAIN NOTED PAIN PRSNT: CPT | Mod: S$GLB,,, | Performed by: PHYSICAL MEDICINE & REHABILITATION

## 2021-01-08 PROCEDURE — 3008F BODY MASS INDEX DOCD: CPT | Mod: CPTII,S$GLB,, | Performed by: PHYSICAL MEDICINE & REHABILITATION

## 2021-01-08 PROCEDURE — 1125F PR PAIN SEVERITY QUANTIFIED, PAIN PRESENT: ICD-10-PCS | Mod: S$GLB,,, | Performed by: PHYSICAL MEDICINE & REHABILITATION

## 2021-01-08 PROCEDURE — 99204 OFFICE O/P NEW MOD 45 MIN: CPT | Mod: S$GLB,,, | Performed by: PHYSICAL MEDICINE & REHABILITATION

## 2021-01-08 PROCEDURE — 99999 PR PBB SHADOW E&M-EST. PATIENT-LVL III: CPT | Mod: PBBFAC,,, | Performed by: PHYSICAL MEDICINE & REHABILITATION

## 2021-01-08 PROCEDURE — 99999 PR PBB SHADOW E&M-EST. PATIENT-LVL III: ICD-10-PCS | Mod: PBBFAC,,, | Performed by: PHYSICAL MEDICINE & REHABILITATION

## 2021-01-08 PROCEDURE — 99204 PR OFFICE/OUTPT VISIT, NEW, LEVL IV, 45-59 MIN: ICD-10-PCS | Mod: S$GLB,,, | Performed by: PHYSICAL MEDICINE & REHABILITATION

## 2021-01-08 PROCEDURE — 3008F PR BODY MASS INDEX (BMI) DOCUMENTED: ICD-10-PCS | Mod: CPTII,S$GLB,, | Performed by: PHYSICAL MEDICINE & REHABILITATION

## 2021-01-08 RX ORDER — DICLOFENAC SODIUM 10 MG/G
4 GEL TOPICAL 4 TIMES DAILY
Qty: 5 TUBE | Refills: 2 | Status: SHIPPED | OUTPATIENT
Start: 2021-01-08 | End: 2021-12-20

## 2021-01-08 RX ORDER — METHOCARBAMOL 500 MG/1
500 TABLET, FILM COATED ORAL 4 TIMES DAILY PRN
Qty: 120 TABLET | Refills: 2 | Status: SHIPPED | OUTPATIENT
Start: 2021-01-08 | End: 2021-08-23

## 2021-01-09 ENCOUNTER — HOSPITAL ENCOUNTER (OUTPATIENT)
Dept: RADIOLOGY | Facility: OTHER | Age: 41
Discharge: HOME | End: 2021-01-09
Attending: PHYSICAL MEDICINE & REHABILITATION
Payer: COMMERCIAL

## 2021-01-09 DIAGNOSIS — M54.50 CHRONIC BILATERAL LOW BACK PAIN WITHOUT SCIATICA: ICD-10-CM

## 2021-01-09 DIAGNOSIS — G89.29 CHRONIC BILATERAL LOW BACK PAIN WITHOUT SCIATICA: ICD-10-CM

## 2021-01-09 PROCEDURE — 72110 XR LUMBAR SPINE 5 VIEW WITH FLEX AND EXT: ICD-10-PCS | Mod: 26,,, | Performed by: RADIOLOGY

## 2021-01-09 PROCEDURE — 72110 X-RAY EXAM L-2 SPINE 4/>VWS: CPT | Mod: 26,,, | Performed by: RADIOLOGY

## 2021-01-09 PROCEDURE — 72110 X-RAY EXAM L-2 SPINE 4/>VWS: CPT | Mod: TC,FY

## 2021-02-05 ENCOUNTER — CLINICAL SUPPORT (OUTPATIENT)
Dept: REHABILITATION | Facility: OTHER | Age: 41
End: 2021-02-05
Attending: PHYSICAL MEDICINE & REHABILITATION
Payer: COMMERCIAL

## 2021-02-05 DIAGNOSIS — R29.3 POOR POSTURE: ICD-10-CM

## 2021-02-05 DIAGNOSIS — R29.898 DECREASED STRENGTH OF TRUNK AND BACK: ICD-10-CM

## 2021-02-05 DIAGNOSIS — G89.29 CHRONIC BILATERAL LOW BACK PAIN WITHOUT SCIATICA: ICD-10-CM

## 2021-02-05 DIAGNOSIS — M25.69 DECREASED RANGE OF MOTION OF TRUNK AND BACK: ICD-10-CM

## 2021-02-05 DIAGNOSIS — M54.50 CHRONIC BILATERAL LOW BACK PAIN WITHOUT SCIATICA: ICD-10-CM

## 2021-02-05 PROCEDURE — 97161 PT EVAL LOW COMPLEX 20 MIN: CPT

## 2021-02-05 PROCEDURE — 97110 THERAPEUTIC EXERCISES: CPT

## 2021-02-08 ENCOUNTER — CLINICAL SUPPORT (OUTPATIENT)
Dept: REHABILITATION | Facility: OTHER | Age: 41
End: 2021-02-08
Attending: PHYSICAL MEDICINE & REHABILITATION
Payer: COMMERCIAL

## 2021-02-08 DIAGNOSIS — R29.898 DECREASED STRENGTH OF TRUNK AND BACK: ICD-10-CM

## 2021-02-08 DIAGNOSIS — R29.3 POOR POSTURE: ICD-10-CM

## 2021-02-08 DIAGNOSIS — M25.69 DECREASED RANGE OF MOTION OF TRUNK AND BACK: Primary | ICD-10-CM

## 2021-02-08 PROCEDURE — 97110 THERAPEUTIC EXERCISES: CPT

## 2021-02-12 ENCOUNTER — CLINICAL SUPPORT (OUTPATIENT)
Dept: URGENT CARE | Facility: CLINIC | Age: 41
End: 2021-02-12
Payer: COMMERCIAL

## 2021-02-12 DIAGNOSIS — Z11.59 ENCOUNTER FOR SCREENING FOR OTHER VIRAL DISEASES: Primary | ICD-10-CM

## 2021-02-12 PROCEDURE — U0003 INFECTIOUS AGENT DETECTION BY NUCLEIC ACID (DNA OR RNA); SEVERE ACUTE RESPIRATORY SYNDROME CORONAVIRUS 2 (SARS-COV-2) (CORONAVIRUS DISEASE [COVID-19]), AMPLIFIED PROBE TECHNIQUE, MAKING USE OF HIGH THROUGHPUT TECHNOLOGIES AS DESCRIBED BY CMS-2020-01-R: HCPCS

## 2021-02-12 PROCEDURE — U0005 INFEC AGEN DETEC AMPLI PROBE: HCPCS

## 2021-02-13 LAB — SARS-COV-2 RNA RESP QL NAA+PROBE: NOT DETECTED

## 2021-03-01 ENCOUNTER — CLINICAL SUPPORT (OUTPATIENT)
Dept: REHABILITATION | Facility: OTHER | Age: 41
End: 2021-03-01
Attending: PHYSICAL MEDICINE & REHABILITATION
Payer: COMMERCIAL

## 2021-03-01 DIAGNOSIS — R29.898 DECREASED STRENGTH OF TRUNK AND BACK: ICD-10-CM

## 2021-03-01 DIAGNOSIS — M25.69 DECREASED RANGE OF MOTION OF TRUNK AND BACK: Primary | ICD-10-CM

## 2021-03-01 DIAGNOSIS — R29.3 POOR POSTURE: ICD-10-CM

## 2021-03-01 PROCEDURE — 97110 THERAPEUTIC EXERCISES: CPT

## 2021-03-08 ENCOUNTER — CLINICAL SUPPORT (OUTPATIENT)
Dept: REHABILITATION | Facility: OTHER | Age: 41
End: 2021-03-08
Attending: PHYSICAL MEDICINE & REHABILITATION
Payer: COMMERCIAL

## 2021-03-08 DIAGNOSIS — M25.69 DECREASED RANGE OF MOTION OF TRUNK AND BACK: Primary | ICD-10-CM

## 2021-03-08 DIAGNOSIS — R29.898 DECREASED STRENGTH OF TRUNK AND BACK: ICD-10-CM

## 2021-03-08 DIAGNOSIS — R29.3 POOR POSTURE: ICD-10-CM

## 2021-03-08 PROCEDURE — 97110 THERAPEUTIC EXERCISES: CPT

## 2021-03-12 ENCOUNTER — CLINICAL SUPPORT (OUTPATIENT)
Dept: REHABILITATION | Facility: OTHER | Age: 41
End: 2021-03-12
Attending: PHYSICAL MEDICINE & REHABILITATION
Payer: COMMERCIAL

## 2021-03-12 DIAGNOSIS — R29.3 POOR POSTURE: ICD-10-CM

## 2021-03-12 DIAGNOSIS — R29.898 DECREASED STRENGTH OF TRUNK AND BACK: ICD-10-CM

## 2021-03-12 DIAGNOSIS — M25.69 DECREASED RANGE OF MOTION OF TRUNK AND BACK: Primary | ICD-10-CM

## 2021-03-12 PROCEDURE — 97110 THERAPEUTIC EXERCISES: CPT | Mod: CQ

## 2021-03-15 ENCOUNTER — CLINICAL SUPPORT (OUTPATIENT)
Dept: REHABILITATION | Facility: OTHER | Age: 41
End: 2021-03-15
Attending: PHYSICAL MEDICINE & REHABILITATION
Payer: COMMERCIAL

## 2021-03-15 DIAGNOSIS — R29.3 POOR POSTURE: ICD-10-CM

## 2021-03-15 DIAGNOSIS — R29.898 DECREASED STRENGTH OF TRUNK AND BACK: ICD-10-CM

## 2021-03-15 DIAGNOSIS — M25.69 DECREASED RANGE OF MOTION OF TRUNK AND BACK: Primary | ICD-10-CM

## 2021-03-15 PROCEDURE — 97110 THERAPEUTIC EXERCISES: CPT

## 2021-03-22 ENCOUNTER — CLINICAL SUPPORT (OUTPATIENT)
Dept: REHABILITATION | Facility: OTHER | Age: 41
End: 2021-03-22
Attending: PHYSICAL MEDICINE & REHABILITATION
Payer: COMMERCIAL

## 2021-03-22 DIAGNOSIS — M25.69 DECREASED RANGE OF MOTION OF TRUNK AND BACK: Primary | ICD-10-CM

## 2021-03-22 DIAGNOSIS — R29.898 DECREASED STRENGTH OF TRUNK AND BACK: ICD-10-CM

## 2021-03-22 DIAGNOSIS — R29.3 POOR POSTURE: ICD-10-CM

## 2021-03-22 PROCEDURE — 97110 THERAPEUTIC EXERCISES: CPT

## 2021-04-01 ENCOUNTER — CLINICAL SUPPORT (OUTPATIENT)
Dept: REHABILITATION | Facility: OTHER | Age: 41
End: 2021-04-01
Attending: PHYSICAL MEDICINE & REHABILITATION
Payer: COMMERCIAL

## 2021-04-01 ENCOUNTER — HOSPITAL ENCOUNTER (OUTPATIENT)
Dept: RADIOLOGY | Facility: OTHER | Age: 41
Discharge: HOME | End: 2021-04-01
Attending: INTERNAL MEDICINE
Payer: COMMERCIAL

## 2021-04-01 DIAGNOSIS — R29.898 DECREASED STRENGTH OF TRUNK AND BACK: ICD-10-CM

## 2021-04-01 DIAGNOSIS — R10.13 EPIGASTRIC ABDOMINAL PAIN: ICD-10-CM

## 2021-04-01 DIAGNOSIS — R29.3 POOR POSTURE: ICD-10-CM

## 2021-04-01 DIAGNOSIS — R79.89 ELEVATED LFTS: ICD-10-CM

## 2021-04-01 DIAGNOSIS — M25.69 DECREASED RANGE OF MOTION OF TRUNK AND BACK: Primary | ICD-10-CM

## 2021-04-01 PROCEDURE — 76705 ECHO EXAM OF ABDOMEN: CPT | Mod: TC

## 2021-04-01 PROCEDURE — 97110 THERAPEUTIC EXERCISES: CPT

## 2021-04-01 PROCEDURE — 76705 US ABDOMEN LIMITED: ICD-10-PCS | Mod: 26,,, | Performed by: RADIOLOGY

## 2021-04-01 PROCEDURE — 76705 ECHO EXAM OF ABDOMEN: CPT | Mod: 26,,, | Performed by: RADIOLOGY

## 2021-04-15 ENCOUNTER — CLINICAL SUPPORT (OUTPATIENT)
Dept: REHABILITATION | Facility: OTHER | Age: 41
End: 2021-04-15
Attending: PHYSICAL MEDICINE & REHABILITATION
Payer: COMMERCIAL

## 2021-04-15 DIAGNOSIS — R29.3 POOR POSTURE: ICD-10-CM

## 2021-04-15 DIAGNOSIS — M25.69 DECREASED RANGE OF MOTION OF TRUNK AND BACK: Primary | ICD-10-CM

## 2021-04-15 DIAGNOSIS — R29.898 DECREASED STRENGTH OF TRUNK AND BACK: ICD-10-CM

## 2021-04-15 PROCEDURE — 97110 THERAPEUTIC EXERCISES: CPT

## 2021-04-19 ENCOUNTER — OFFICE VISIT (OUTPATIENT)
Dept: SPINE | Facility: CLINIC | Age: 41
End: 2021-04-19
Attending: PHYSICAL MEDICINE & REHABILITATION
Payer: COMMERCIAL

## 2021-04-19 ENCOUNTER — CLINICAL SUPPORT (OUTPATIENT)
Dept: REHABILITATION | Facility: OTHER | Age: 41
End: 2021-04-19
Attending: PHYSICAL MEDICINE & REHABILITATION
Payer: COMMERCIAL

## 2021-04-19 VITALS
HEIGHT: 70 IN | WEIGHT: 162.94 LBS | DIASTOLIC BLOOD PRESSURE: 62 MMHG | SYSTOLIC BLOOD PRESSURE: 123 MMHG | HEART RATE: 69 BPM | BODY MASS INDEX: 23.33 KG/M2

## 2021-04-19 DIAGNOSIS — R29.898 DECREASED STRENGTH OF TRUNK AND BACK: ICD-10-CM

## 2021-04-19 DIAGNOSIS — M54.50 CHRONIC BILATERAL LOW BACK PAIN WITHOUT SCIATICA: Primary | ICD-10-CM

## 2021-04-19 DIAGNOSIS — R29.3 POOR POSTURE: ICD-10-CM

## 2021-04-19 DIAGNOSIS — G89.29 CHRONIC BILATERAL LOW BACK PAIN WITHOUT SCIATICA: Primary | ICD-10-CM

## 2021-04-19 DIAGNOSIS — M25.69 DECREASED RANGE OF MOTION OF TRUNK AND BACK: Primary | ICD-10-CM

## 2021-04-19 PROCEDURE — 97110 THERAPEUTIC EXERCISES: CPT

## 2021-04-19 PROCEDURE — 3008F PR BODY MASS INDEX (BMI) DOCUMENTED: ICD-10-PCS | Mod: CPTII,S$GLB,, | Performed by: PHYSICAL MEDICINE & REHABILITATION

## 2021-04-19 PROCEDURE — 99999 PR PBB SHADOW E&M-EST. PATIENT-LVL III: ICD-10-PCS | Mod: PBBFAC,,, | Performed by: PHYSICAL MEDICINE & REHABILITATION

## 2021-04-19 PROCEDURE — 3008F BODY MASS INDEX DOCD: CPT | Mod: CPTII,S$GLB,, | Performed by: PHYSICAL MEDICINE & REHABILITATION

## 2021-04-19 PROCEDURE — 1126F AMNT PAIN NOTED NONE PRSNT: CPT | Mod: S$GLB,,, | Performed by: PHYSICAL MEDICINE & REHABILITATION

## 2021-04-19 PROCEDURE — 1126F PR PAIN SEVERITY QUANTIFIED, NO PAIN PRESENT: ICD-10-PCS | Mod: S$GLB,,, | Performed by: PHYSICAL MEDICINE & REHABILITATION

## 2021-04-19 PROCEDURE — 99214 OFFICE O/P EST MOD 30 MIN: CPT | Mod: S$GLB,,, | Performed by: PHYSICAL MEDICINE & REHABILITATION

## 2021-04-19 PROCEDURE — 99214 PR OFFICE/OUTPT VISIT, EST, LEVL IV, 30-39 MIN: ICD-10-PCS | Mod: S$GLB,,, | Performed by: PHYSICAL MEDICINE & REHABILITATION

## 2021-04-19 PROCEDURE — 99999 PR PBB SHADOW E&M-EST. PATIENT-LVL III: CPT | Mod: PBBFAC,,, | Performed by: PHYSICAL MEDICINE & REHABILITATION

## 2021-04-26 ENCOUNTER — CLINICAL SUPPORT (OUTPATIENT)
Dept: REHABILITATION | Facility: OTHER | Age: 41
End: 2021-04-26
Attending: PHYSICAL MEDICINE & REHABILITATION
Payer: COMMERCIAL

## 2021-04-26 DIAGNOSIS — R29.3 POOR POSTURE: ICD-10-CM

## 2021-04-26 DIAGNOSIS — M25.69 DECREASED RANGE OF MOTION OF TRUNK AND BACK: Primary | ICD-10-CM

## 2021-04-26 DIAGNOSIS — R29.898 DECREASED STRENGTH OF TRUNK AND BACK: ICD-10-CM

## 2021-04-26 PROCEDURE — 97110 THERAPEUTIC EXERCISES: CPT | Mod: CQ

## 2021-04-30 ENCOUNTER — CLINICAL SUPPORT (OUTPATIENT)
Dept: REHABILITATION | Facility: OTHER | Age: 41
End: 2021-04-30
Attending: PHYSICAL MEDICINE & REHABILITATION
Payer: COMMERCIAL

## 2021-04-30 DIAGNOSIS — M25.69 DECREASED RANGE OF MOTION OF TRUNK AND BACK: Primary | ICD-10-CM

## 2021-04-30 DIAGNOSIS — R29.3 POOR POSTURE: ICD-10-CM

## 2021-04-30 DIAGNOSIS — R29.898 DECREASED STRENGTH OF TRUNK AND BACK: ICD-10-CM

## 2021-04-30 PROCEDURE — 97110 THERAPEUTIC EXERCISES: CPT

## 2021-05-03 ENCOUNTER — CLINICAL SUPPORT (OUTPATIENT)
Dept: REHABILITATION | Facility: OTHER | Age: 41
End: 2021-05-03
Attending: PHYSICAL MEDICINE & REHABILITATION
Payer: COMMERCIAL

## 2021-05-03 DIAGNOSIS — R29.3 POOR POSTURE: ICD-10-CM

## 2021-05-03 DIAGNOSIS — M25.69 DECREASED RANGE OF MOTION OF TRUNK AND BACK: Primary | ICD-10-CM

## 2021-05-03 DIAGNOSIS — R29.898 DECREASED STRENGTH OF TRUNK AND BACK: ICD-10-CM

## 2021-05-03 PROCEDURE — 97110 THERAPEUTIC EXERCISES: CPT | Mod: CQ

## 2021-05-07 ENCOUNTER — CLINICAL SUPPORT (OUTPATIENT)
Dept: REHABILITATION | Facility: OTHER | Age: 41
End: 2021-05-07
Attending: PHYSICAL MEDICINE & REHABILITATION
Payer: COMMERCIAL

## 2021-05-07 DIAGNOSIS — R29.3 POOR POSTURE: ICD-10-CM

## 2021-05-07 DIAGNOSIS — R29.898 DECREASED STRENGTH OF TRUNK AND BACK: ICD-10-CM

## 2021-05-07 DIAGNOSIS — M25.69 DECREASED RANGE OF MOTION OF TRUNK AND BACK: Primary | ICD-10-CM

## 2021-05-07 PROCEDURE — 97110 THERAPEUTIC EXERCISES: CPT

## 2021-05-10 ENCOUNTER — CLINICAL SUPPORT (OUTPATIENT)
Dept: REHABILITATION | Facility: OTHER | Age: 41
End: 2021-05-10
Attending: PHYSICAL MEDICINE & REHABILITATION
Payer: COMMERCIAL

## 2021-05-10 DIAGNOSIS — M25.69 DECREASED RANGE OF MOTION OF TRUNK AND BACK: Primary | ICD-10-CM

## 2021-05-10 DIAGNOSIS — R29.898 DECREASED STRENGTH OF TRUNK AND BACK: ICD-10-CM

## 2021-05-10 DIAGNOSIS — R29.3 POOR POSTURE: ICD-10-CM

## 2021-05-10 PROCEDURE — 97110 THERAPEUTIC EXERCISES: CPT

## 2021-05-14 ENCOUNTER — CLINICAL SUPPORT (OUTPATIENT)
Dept: REHABILITATION | Facility: OTHER | Age: 41
End: 2021-05-14
Attending: PHYSICAL MEDICINE & REHABILITATION
Payer: COMMERCIAL

## 2021-05-14 DIAGNOSIS — R29.898 DECREASED STRENGTH OF TRUNK AND BACK: ICD-10-CM

## 2021-05-14 DIAGNOSIS — M25.69 DECREASED RANGE OF MOTION OF TRUNK AND BACK: Primary | ICD-10-CM

## 2021-05-14 DIAGNOSIS — R29.3 POOR POSTURE: ICD-10-CM

## 2021-05-14 PROCEDURE — 97110 THERAPEUTIC EXERCISES: CPT

## 2021-05-17 ENCOUNTER — CLINICAL SUPPORT (OUTPATIENT)
Dept: REHABILITATION | Facility: OTHER | Age: 41
End: 2021-05-17
Attending: PHYSICAL MEDICINE & REHABILITATION
Payer: COMMERCIAL

## 2021-05-17 DIAGNOSIS — R29.898 DECREASED STRENGTH OF TRUNK AND BACK: ICD-10-CM

## 2021-05-17 DIAGNOSIS — R29.3 POOR POSTURE: ICD-10-CM

## 2021-05-17 DIAGNOSIS — M25.69 DECREASED RANGE OF MOTION OF TRUNK AND BACK: Primary | ICD-10-CM

## 2021-05-17 PROCEDURE — 97110 THERAPEUTIC EXERCISES: CPT

## 2021-05-21 ENCOUNTER — CLINICAL SUPPORT (OUTPATIENT)
Dept: REHABILITATION | Facility: OTHER | Age: 41
End: 2021-05-21
Attending: PHYSICAL MEDICINE & REHABILITATION
Payer: COMMERCIAL

## 2021-05-21 DIAGNOSIS — M25.69 DECREASED RANGE OF MOTION OF TRUNK AND BACK: Primary | ICD-10-CM

## 2021-05-21 DIAGNOSIS — R29.898 DECREASED STRENGTH OF TRUNK AND BACK: ICD-10-CM

## 2021-05-21 DIAGNOSIS — R29.3 POOR POSTURE: ICD-10-CM

## 2021-05-21 PROCEDURE — 97110 THERAPEUTIC EXERCISES: CPT

## 2021-05-27 ENCOUNTER — PATIENT MESSAGE (OUTPATIENT)
Dept: INTERNAL MEDICINE | Facility: CLINIC | Age: 41
End: 2021-05-27

## 2021-05-28 ENCOUNTER — PATIENT MESSAGE (OUTPATIENT)
Dept: INTERNAL MEDICINE | Facility: CLINIC | Age: 41
End: 2021-05-28

## 2021-05-28 ENCOUNTER — OFFICE VISIT (OUTPATIENT)
Dept: INTERNAL MEDICINE | Facility: CLINIC | Age: 41
End: 2021-05-28
Payer: COMMERCIAL

## 2021-05-28 ENCOUNTER — HOSPITAL ENCOUNTER (OUTPATIENT)
Dept: RADIOLOGY | Facility: HOSPITAL | Age: 41
Discharge: HOME | End: 2021-05-28
Attending: NURSE PRACTITIONER
Payer: COMMERCIAL

## 2021-05-28 VITALS
SYSTOLIC BLOOD PRESSURE: 124 MMHG | WEIGHT: 161.19 LBS | DIASTOLIC BLOOD PRESSURE: 86 MMHG | HEIGHT: 69 IN | BODY MASS INDEX: 23.87 KG/M2 | OXYGEN SATURATION: 99 % | HEART RATE: 70 BPM

## 2021-05-28 DIAGNOSIS — R00.2 PALPITATION: ICD-10-CM

## 2021-05-28 DIAGNOSIS — R61 NIGHT SWEATS: ICD-10-CM

## 2021-05-28 DIAGNOSIS — R00.2 PALPITATION: Primary | ICD-10-CM

## 2021-05-28 DIAGNOSIS — R00.0 TACHYCARDIA: ICD-10-CM

## 2021-05-28 PROCEDURE — 99214 PR OFFICE/OUTPT VISIT, EST, LEVL IV, 30-39 MIN: ICD-10-PCS | Mod: S$GLB,,, | Performed by: NURSE PRACTITIONER

## 2021-05-28 PROCEDURE — 71046 XR CHEST PA AND LATERAL: ICD-10-PCS | Mod: 26,,, | Performed by: RADIOLOGY

## 2021-05-28 PROCEDURE — 3008F PR BODY MASS INDEX (BMI) DOCUMENTED: ICD-10-PCS | Mod: CPTII,S$GLB,, | Performed by: NURSE PRACTITIONER

## 2021-05-28 PROCEDURE — 1126F AMNT PAIN NOTED NONE PRSNT: CPT | Mod: S$GLB,,, | Performed by: NURSE PRACTITIONER

## 2021-05-28 PROCEDURE — 3008F BODY MASS INDEX DOCD: CPT | Mod: CPTII,S$GLB,, | Performed by: NURSE PRACTITIONER

## 2021-05-28 PROCEDURE — 99999 PR PBB SHADOW E&M-EST. PATIENT-LVL III: ICD-10-PCS | Mod: PBBFAC,,, | Performed by: NURSE PRACTITIONER

## 2021-05-28 PROCEDURE — 1126F PR PAIN SEVERITY QUANTIFIED, NO PAIN PRESENT: ICD-10-PCS | Mod: S$GLB,,, | Performed by: NURSE PRACTITIONER

## 2021-05-28 PROCEDURE — 93005 EKG 12-LEAD: ICD-10-PCS | Mod: S$GLB,,, | Performed by: NURSE PRACTITIONER

## 2021-05-28 PROCEDURE — 93005 ELECTROCARDIOGRAM TRACING: CPT | Mod: S$GLB,,, | Performed by: NURSE PRACTITIONER

## 2021-05-28 PROCEDURE — 99214 OFFICE O/P EST MOD 30 MIN: CPT | Mod: S$GLB,,, | Performed by: NURSE PRACTITIONER

## 2021-05-28 PROCEDURE — 93010 ELECTROCARDIOGRAM REPORT: CPT | Mod: S$GLB,,, | Performed by: INTERNAL MEDICINE

## 2021-05-28 PROCEDURE — 71046 X-RAY EXAM CHEST 2 VIEWS: CPT | Mod: 26,,, | Performed by: RADIOLOGY

## 2021-05-28 PROCEDURE — 93010 EKG 12-LEAD: ICD-10-PCS | Mod: S$GLB,,, | Performed by: INTERNAL MEDICINE

## 2021-05-28 PROCEDURE — 99999 PR PBB SHADOW E&M-EST. PATIENT-LVL III: CPT | Mod: PBBFAC,,, | Performed by: NURSE PRACTITIONER

## 2021-05-28 PROCEDURE — 71046 X-RAY EXAM CHEST 2 VIEWS: CPT | Mod: TC

## 2021-05-28 RX ORDER — ONDANSETRON 4 MG/1
4 TABLET, ORALLY DISINTEGRATING ORAL EVERY 6 HOURS PRN
COMMUNITY
Start: 2021-02-15

## 2021-05-31 ENCOUNTER — CLINICAL SUPPORT (OUTPATIENT)
Dept: REHABILITATION | Facility: OTHER | Age: 41
End: 2021-05-31
Attending: PHYSICAL MEDICINE & REHABILITATION
Payer: COMMERCIAL

## 2021-05-31 DIAGNOSIS — R29.3 POOR POSTURE: ICD-10-CM

## 2021-05-31 DIAGNOSIS — R29.898 DECREASED STRENGTH OF TRUNK AND BACK: ICD-10-CM

## 2021-05-31 DIAGNOSIS — M25.69 DECREASED RANGE OF MOTION OF TRUNK AND BACK: Primary | ICD-10-CM

## 2021-05-31 PROCEDURE — 97110 THERAPEUTIC EXERCISES: CPT

## 2021-06-04 ENCOUNTER — CLINICAL SUPPORT (OUTPATIENT)
Dept: REHABILITATION | Facility: OTHER | Age: 41
End: 2021-06-04
Attending: PHYSICAL MEDICINE & REHABILITATION
Payer: COMMERCIAL

## 2021-06-04 DIAGNOSIS — M50.30 DDD (DEGENERATIVE DISC DISEASE), CERVICAL: Primary | ICD-10-CM

## 2021-06-04 PROCEDURE — 97110 THERAPEUTIC EXERCISES: CPT

## 2021-06-04 PROCEDURE — 97750 PHYSICAL PERFORMANCE TEST: CPT

## 2021-08-25 ENCOUNTER — HOSPITAL ENCOUNTER (OUTPATIENT)
Dept: RADIOLOGY | Facility: HOSPITAL | Age: 41
Discharge: HOME | End: 2021-08-25
Attending: ORTHOPAEDIC SURGERY
Payer: COMMERCIAL

## 2021-08-25 ENCOUNTER — TELEPHONE (OUTPATIENT)
Dept: ORTHOPEDICS | Facility: CLINIC | Age: 41
End: 2021-08-25

## 2021-08-25 ENCOUNTER — OFFICE VISIT (OUTPATIENT)
Dept: ORTHOPEDICS | Facility: CLINIC | Age: 41
End: 2021-08-25
Payer: COMMERCIAL

## 2021-08-25 VITALS
BODY MASS INDEX: 23.8 KG/M2 | HEART RATE: 74 BPM | DIASTOLIC BLOOD PRESSURE: 71 MMHG | HEIGHT: 69 IN | WEIGHT: 160.69 LBS | SYSTOLIC BLOOD PRESSURE: 123 MMHG

## 2021-08-25 DIAGNOSIS — M25.511 ACUTE PAIN OF BOTH SHOULDERS: ICD-10-CM

## 2021-08-25 DIAGNOSIS — M19.011 ARTHRITIS OF RIGHT ACROMIOCLAVICULAR JOINT: Primary | ICD-10-CM

## 2021-08-25 DIAGNOSIS — M25.511 ACUTE PAIN OF BOTH SHOULDERS: Primary | ICD-10-CM

## 2021-08-25 DIAGNOSIS — M25.512 ACUTE PAIN OF BOTH SHOULDERS: Primary | ICD-10-CM

## 2021-08-25 DIAGNOSIS — M25.512 ACUTE PAIN OF BOTH SHOULDERS: ICD-10-CM

## 2021-08-25 DIAGNOSIS — M25.512 ACUTE PAIN OF LEFT SHOULDER: ICD-10-CM

## 2021-08-25 PROCEDURE — 3074F SYST BP LT 130 MM HG: CPT | Mod: CPTII,S$GLB,, | Performed by: ORTHOPAEDIC SURGERY

## 2021-08-25 PROCEDURE — 1125F PR PAIN SEVERITY QUANTIFIED, PAIN PRESENT: ICD-10-PCS | Mod: CPTII,S$GLB,, | Performed by: ORTHOPAEDIC SURGERY

## 2021-08-25 PROCEDURE — 99999 PR PBB SHADOW E&M-EST. PATIENT-LVL III: ICD-10-PCS | Mod: PBBFAC,,, | Performed by: ORTHOPAEDIC SURGERY

## 2021-08-25 PROCEDURE — 3008F PR BODY MASS INDEX (BMI) DOCUMENTED: ICD-10-PCS | Mod: CPTII,S$GLB,, | Performed by: ORTHOPAEDIC SURGERY

## 2021-08-25 PROCEDURE — 99204 OFFICE O/P NEW MOD 45 MIN: CPT | Mod: 25,S$GLB,, | Performed by: ORTHOPAEDIC SURGERY

## 2021-08-25 PROCEDURE — 3008F BODY MASS INDEX DOCD: CPT | Mod: CPTII,S$GLB,, | Performed by: ORTHOPAEDIC SURGERY

## 2021-08-25 PROCEDURE — 1159F PR MEDICATION LIST DOCUMENTED IN MEDICAL RECORD: ICD-10-PCS | Mod: CPTII,S$GLB,, | Performed by: ORTHOPAEDIC SURGERY

## 2021-08-25 PROCEDURE — 1125F AMNT PAIN NOTED PAIN PRSNT: CPT | Mod: CPTII,S$GLB,, | Performed by: ORTHOPAEDIC SURGERY

## 2021-08-25 PROCEDURE — 99204 PR OFFICE/OUTPT VISIT, NEW, LEVL IV, 45-59 MIN: ICD-10-PCS | Mod: 25,S$GLB,, | Performed by: ORTHOPAEDIC SURGERY

## 2021-08-25 PROCEDURE — 1159F MED LIST DOCD IN RCRD: CPT | Mod: CPTII,S$GLB,, | Performed by: ORTHOPAEDIC SURGERY

## 2021-08-25 PROCEDURE — 3078F DIAST BP <80 MM HG: CPT | Mod: CPTII,S$GLB,, | Performed by: ORTHOPAEDIC SURGERY

## 2021-08-25 PROCEDURE — 3078F PR MOST RECENT DIASTOLIC BLOOD PRESSURE < 80 MM HG: ICD-10-PCS | Mod: CPTII,S$GLB,, | Performed by: ORTHOPAEDIC SURGERY

## 2021-08-25 PROCEDURE — 20605 DRAIN/INJ JOINT/BURSA W/O US: CPT | Mod: RT,S$GLB,, | Performed by: ORTHOPAEDIC SURGERY

## 2021-08-25 PROCEDURE — 3074F PR MOST RECENT SYSTOLIC BLOOD PRESSURE < 130 MM HG: ICD-10-PCS | Mod: CPTII,S$GLB,, | Performed by: ORTHOPAEDIC SURGERY

## 2021-08-25 PROCEDURE — 99999 PR PBB SHADOW E&M-EST. PATIENT-LVL III: CPT | Mod: PBBFAC,,, | Performed by: ORTHOPAEDIC SURGERY

## 2021-08-25 PROCEDURE — 20605 INTERMEDIATE JOINT ASPIRATION/INJECTION: R ACROMIOCLAVICULAR: ICD-10-PCS | Mod: RT,S$GLB,, | Performed by: ORTHOPAEDIC SURGERY

## 2021-08-25 PROCEDURE — 73030 X-RAY EXAM OF SHOULDER: CPT | Mod: 26,,, | Performed by: RADIOLOGY

## 2021-08-25 PROCEDURE — 73030 X-RAY EXAM OF SHOULDER: CPT | Mod: TC,50,FY

## 2021-08-25 PROCEDURE — 73030 XR SHOULDER COMPLETE 2 OR MORE VIEWS BILATERAL: ICD-10-PCS | Mod: 26,,, | Performed by: RADIOLOGY

## 2021-08-25 RX ADMIN — TRIAMCINOLONE ACETONIDE 40 MG: 40 INJECTION, SUSPENSION INTRA-ARTICULAR; INTRAMUSCULAR at 02:08

## 2021-08-27 RX ORDER — TRIAMCINOLONE ACETONIDE 40 MG/ML
40 INJECTION, SUSPENSION INTRA-ARTICULAR; INTRAMUSCULAR
Status: DISCONTINUED | OUTPATIENT
Start: 2021-08-25 | End: 2021-08-27 | Stop reason: HOSPADM

## 2021-10-27 ENCOUNTER — TELEPHONE (OUTPATIENT)
Dept: PAIN MEDICINE | Facility: CLINIC | Age: 41
End: 2021-10-27
Payer: COMMERCIAL

## 2021-10-28 ENCOUNTER — OFFICE VISIT (OUTPATIENT)
Dept: PAIN MEDICINE | Facility: CLINIC | Age: 41
End: 2021-10-28
Attending: ANESTHESIOLOGY
Payer: COMMERCIAL

## 2021-10-28 ENCOUNTER — PATIENT MESSAGE (OUTPATIENT)
Dept: PAIN MEDICINE | Facility: CLINIC | Age: 41
End: 2021-10-28

## 2021-10-28 VITALS
TEMPERATURE: 98 F | WEIGHT: 162.94 LBS | HEIGHT: 69 IN | DIASTOLIC BLOOD PRESSURE: 80 MMHG | RESPIRATION RATE: 19 BRPM | BODY MASS INDEX: 24.13 KG/M2 | SYSTOLIC BLOOD PRESSURE: 124 MMHG | HEART RATE: 66 BPM

## 2021-10-28 DIAGNOSIS — M46.1 SACROILIITIS: ICD-10-CM

## 2021-10-28 DIAGNOSIS — M47.9 OSTEOARTHRITIS OF SPINE, UNSPECIFIED SPINAL OSTEOARTHRITIS COMPLICATION STATUS, UNSPECIFIED SPINAL REGION: Primary | ICD-10-CM

## 2021-10-28 DIAGNOSIS — M47.819 SPONDYLOSIS WITHOUT MYELOPATHY OR RADICULOPATHY: ICD-10-CM

## 2021-10-28 PROCEDURE — 1160F PR REVIEW ALL MEDS BY PRESCRIBER/CLIN PHARMACIST DOCUMENTED: ICD-10-PCS | Mod: CPTII,S$GLB,, | Performed by: ANESTHESIOLOGY

## 2021-10-28 PROCEDURE — 1159F PR MEDICATION LIST DOCUMENTED IN MEDICAL RECORD: ICD-10-PCS | Mod: CPTII,S$GLB,, | Performed by: ANESTHESIOLOGY

## 2021-10-28 PROCEDURE — 3079F PR MOST RECENT DIASTOLIC BLOOD PRESSURE 80-89 MM HG: ICD-10-PCS | Mod: CPTII,S$GLB,, | Performed by: ANESTHESIOLOGY

## 2021-10-28 PROCEDURE — 99999 PR PBB SHADOW E&M-EST. PATIENT-LVL III: ICD-10-PCS | Mod: PBBFAC,,, | Performed by: ANESTHESIOLOGY

## 2021-10-28 PROCEDURE — 1159F MED LIST DOCD IN RCRD: CPT | Mod: CPTII,S$GLB,, | Performed by: ANESTHESIOLOGY

## 2021-10-28 PROCEDURE — 99204 OFFICE O/P NEW MOD 45 MIN: CPT | Mod: S$GLB,,, | Performed by: ANESTHESIOLOGY

## 2021-10-28 PROCEDURE — 3074F PR MOST RECENT SYSTOLIC BLOOD PRESSURE < 130 MM HG: ICD-10-PCS | Mod: CPTII,S$GLB,, | Performed by: ANESTHESIOLOGY

## 2021-10-28 PROCEDURE — 3079F DIAST BP 80-89 MM HG: CPT | Mod: CPTII,S$GLB,, | Performed by: ANESTHESIOLOGY

## 2021-10-28 PROCEDURE — 99999 PR PBB SHADOW E&M-EST. PATIENT-LVL III: CPT | Mod: PBBFAC,,, | Performed by: ANESTHESIOLOGY

## 2021-10-28 PROCEDURE — 1160F RVW MEDS BY RX/DR IN RCRD: CPT | Mod: CPTII,S$GLB,, | Performed by: ANESTHESIOLOGY

## 2021-10-28 PROCEDURE — 3008F PR BODY MASS INDEX (BMI) DOCUMENTED: ICD-10-PCS | Mod: CPTII,S$GLB,, | Performed by: ANESTHESIOLOGY

## 2021-10-28 PROCEDURE — 3074F SYST BP LT 130 MM HG: CPT | Mod: CPTII,S$GLB,, | Performed by: ANESTHESIOLOGY

## 2021-10-28 PROCEDURE — 99204 PR OFFICE/OUTPT VISIT, NEW, LEVL IV, 45-59 MIN: ICD-10-PCS | Mod: S$GLB,,, | Performed by: ANESTHESIOLOGY

## 2021-10-28 PROCEDURE — 3008F BODY MASS INDEX DOCD: CPT | Mod: CPTII,S$GLB,, | Performed by: ANESTHESIOLOGY

## 2021-11-02 ENCOUNTER — TELEPHONE (OUTPATIENT)
Dept: PAIN MEDICINE | Facility: CLINIC | Age: 41
End: 2021-11-02
Payer: COMMERCIAL

## 2021-11-03 ENCOUNTER — HOSPITAL ENCOUNTER (OUTPATIENT)
Facility: OTHER | Age: 41
Discharge: HOME | End: 2021-11-03
Attending: ANESTHESIOLOGY | Admitting: ANESTHESIOLOGY
Payer: COMMERCIAL

## 2021-11-03 VITALS
SYSTOLIC BLOOD PRESSURE: 113 MMHG | OXYGEN SATURATION: 95 % | WEIGHT: 160 LBS | HEIGHT: 69 IN | TEMPERATURE: 98 F | DIASTOLIC BLOOD PRESSURE: 59 MMHG | BODY MASS INDEX: 23.7 KG/M2 | HEART RATE: 66 BPM | RESPIRATION RATE: 14 BRPM

## 2021-11-03 DIAGNOSIS — M47.816 LUMBAR SPONDYLOSIS: Primary | ICD-10-CM

## 2021-11-03 DIAGNOSIS — G89.29 CHRONIC PAIN: ICD-10-CM

## 2021-11-03 PROCEDURE — 64493 INJ PARAVERT F JNT L/S 1 LEV: CPT | Mod: RT,,, | Performed by: ANESTHESIOLOGY

## 2021-11-03 PROCEDURE — 64493 PR INJ DX/THER AGNT PARAVERT FACET JOINT,IMG GUIDE,LUMBAR/SAC,1ST LVL: ICD-10-PCS | Mod: RT,,, | Performed by: ANESTHESIOLOGY

## 2021-11-03 PROCEDURE — 64494 PR INJ DX/THER AGNT PARAVERT FACET JOINT,IMG GUIDE,LUMBAR/SAC, 2ND LEVEL: ICD-10-PCS | Mod: RT,,, | Performed by: ANESTHESIOLOGY

## 2021-11-03 PROCEDURE — 64494 INJ PARAVERT F JNT L/S 2 LEV: CPT | Mod: RT | Performed by: ANESTHESIOLOGY

## 2021-11-03 PROCEDURE — 64494 INJ PARAVERT F JNT L/S 2 LEV: CPT | Mod: RT,,, | Performed by: ANESTHESIOLOGY

## 2021-11-03 PROCEDURE — 64493 INJ PARAVERT F JNT L/S 1 LEV: CPT | Mod: RT | Performed by: ANESTHESIOLOGY

## 2021-11-03 PROCEDURE — 25500020 PHARM REV CODE 255: Performed by: ANESTHESIOLOGY

## 2021-11-03 PROCEDURE — 25000003 PHARM REV CODE 250: Performed by: ANESTHESIOLOGY

## 2021-11-03 PROCEDURE — 63600175 PHARM REV CODE 636 W HCPCS: Performed by: ANESTHESIOLOGY

## 2021-11-03 PROCEDURE — 25000003 PHARM REV CODE 250: Performed by: STUDENT IN AN ORGANIZED HEALTH CARE EDUCATION/TRAINING PROGRAM

## 2021-11-03 RX ORDER — FENTANYL CITRATE 50 UG/ML
INJECTION, SOLUTION INTRAMUSCULAR; INTRAVENOUS
Status: DISCONTINUED | OUTPATIENT
Start: 2021-11-03 | End: 2021-11-03 | Stop reason: HOSPADM

## 2021-11-03 RX ORDER — BUPIVACAINE HYDROCHLORIDE 2.5 MG/ML
INJECTION, SOLUTION EPIDURAL; INFILTRATION; INTRACAUDAL
Status: DISCONTINUED | OUTPATIENT
Start: 2021-11-03 | End: 2021-11-03 | Stop reason: HOSPADM

## 2021-11-03 RX ORDER — SODIUM CHLORIDE 9 MG/ML
INJECTION, SOLUTION INTRAVENOUS CONTINUOUS
Status: DISCONTINUED | OUTPATIENT
Start: 2021-11-03 | End: 2021-11-03 | Stop reason: HOSPADM

## 2021-11-03 RX ORDER — MIDAZOLAM HYDROCHLORIDE 1 MG/ML
INJECTION INTRAMUSCULAR; INTRAVENOUS
Status: DISCONTINUED | OUTPATIENT
Start: 2021-11-03 | End: 2021-11-03 | Stop reason: HOSPADM

## 2021-11-03 RX ORDER — LIDOCAINE HYDROCHLORIDE 20 MG/ML
INJECTION, SOLUTION INFILTRATION; PERINEURAL
Status: DISCONTINUED | OUTPATIENT
Start: 2021-11-03 | End: 2021-11-03 | Stop reason: HOSPADM

## 2021-11-03 RX ORDER — DEXAMETHASONE SODIUM PHOSPHATE 10 MG/ML
INJECTION INTRAMUSCULAR; INTRAVENOUS
Status: DISCONTINUED | OUTPATIENT
Start: 2021-11-03 | End: 2021-11-03 | Stop reason: HOSPADM

## 2021-11-03 RX ADMIN — SODIUM CHLORIDE: 0.9 INJECTION, SOLUTION INTRAVENOUS at 11:11

## 2021-11-09 ENCOUNTER — HOSPITAL ENCOUNTER (OUTPATIENT)
Dept: RADIOLOGY | Facility: HOSPITAL | Age: 41
Discharge: HOME | End: 2021-11-09
Attending: ANESTHESIOLOGY
Payer: COMMERCIAL

## 2021-11-09 DIAGNOSIS — M47.9 OSTEOARTHRITIS OF SPINE, UNSPECIFIED SPINAL OSTEOARTHRITIS COMPLICATION STATUS, UNSPECIFIED SPINAL REGION: ICD-10-CM

## 2021-11-09 DIAGNOSIS — M47.819 SPONDYLOSIS WITHOUT MYELOPATHY OR RADICULOPATHY: ICD-10-CM

## 2021-11-09 PROCEDURE — 72148 MRI LUMBAR SPINE W/O DYE: CPT | Mod: 26,,, | Performed by: INTERNAL MEDICINE

## 2021-11-09 PROCEDURE — 72148 MRI LUMBAR SPINE WITHOUT CONTRAST: ICD-10-PCS | Mod: 26,,, | Performed by: INTERNAL MEDICINE

## 2021-11-09 PROCEDURE — 72148 MRI LUMBAR SPINE W/O DYE: CPT | Mod: TC

## 2021-11-15 ENCOUNTER — TELEPHONE (OUTPATIENT)
Dept: PAIN MEDICINE | Facility: CLINIC | Age: 41
End: 2021-11-15
Payer: COMMERCIAL

## 2021-11-18 ENCOUNTER — PATIENT MESSAGE (OUTPATIENT)
Dept: PAIN MEDICINE | Facility: CLINIC | Age: 41
End: 2021-11-18
Payer: COMMERCIAL

## 2021-11-18 ENCOUNTER — TELEPHONE (OUTPATIENT)
Dept: PAIN MEDICINE | Facility: CLINIC | Age: 41
End: 2021-11-18
Payer: COMMERCIAL

## 2021-12-15 ENCOUNTER — TELEPHONE (OUTPATIENT)
Dept: URGENT CARE | Facility: CLINIC | Age: 41
End: 2021-12-15
Payer: COMMERCIAL

## 2021-12-15 DIAGNOSIS — Z20.822 ENCOUNTER FOR LABORATORY TESTING FOR COVID-19 VIRUS: ICD-10-CM

## 2021-12-17 ENCOUNTER — PATIENT MESSAGE (OUTPATIENT)
Dept: PAIN MEDICINE | Facility: CLINIC | Age: 41
End: 2021-12-17
Payer: COMMERCIAL

## 2021-12-20 ENCOUNTER — OFFICE VISIT (OUTPATIENT)
Dept: PAIN MEDICINE | Facility: CLINIC | Age: 41
End: 2021-12-20
Attending: ANESTHESIOLOGY
Payer: COMMERCIAL

## 2021-12-20 DIAGNOSIS — M47.9 OSTEOARTHRITIS OF SPINE, UNSPECIFIED SPINAL OSTEOARTHRITIS COMPLICATION STATUS, UNSPECIFIED SPINAL REGION: Primary | ICD-10-CM

## 2021-12-20 DIAGNOSIS — M70.62 GREATER TROCHANTERIC BURSITIS OF BOTH HIPS: ICD-10-CM

## 2021-12-20 DIAGNOSIS — M46.1 SACROILIITIS: ICD-10-CM

## 2021-12-20 DIAGNOSIS — M54.89 VERTEBROGENIC PAIN: ICD-10-CM

## 2021-12-20 DIAGNOSIS — M70.61 GREATER TROCHANTERIC BURSITIS OF BOTH HIPS: ICD-10-CM

## 2021-12-20 PROCEDURE — 1160F RVW MEDS BY RX/DR IN RCRD: CPT | Mod: CPTII,95,, | Performed by: ANESTHESIOLOGY

## 2021-12-20 PROCEDURE — 99214 OFFICE O/P EST MOD 30 MIN: CPT | Mod: 95,,, | Performed by: ANESTHESIOLOGY

## 2021-12-20 PROCEDURE — 99214 PR OFFICE/OUTPT VISIT, EST, LEVL IV, 30-39 MIN: ICD-10-PCS | Mod: 95,,, | Performed by: ANESTHESIOLOGY

## 2021-12-20 PROCEDURE — 1160F PR REVIEW ALL MEDS BY PRESCRIBER/CLIN PHARMACIST DOCUMENTED: ICD-10-PCS | Mod: CPTII,95,, | Performed by: ANESTHESIOLOGY

## 2021-12-20 PROCEDURE — 1159F PR MEDICATION LIST DOCUMENTED IN MEDICAL RECORD: ICD-10-PCS | Mod: CPTII,95,, | Performed by: ANESTHESIOLOGY

## 2021-12-20 PROCEDURE — 1159F MED LIST DOCD IN RCRD: CPT | Mod: CPTII,95,, | Performed by: ANESTHESIOLOGY

## 2021-12-20 RX ORDER — CELECOXIB 100 MG/1
100 CAPSULE ORAL 2 TIMES DAILY
Qty: 60 CAPSULE | Refills: 2 | Status: SHIPPED | OUTPATIENT
Start: 2021-12-20 | End: 2022-03-20

## 2021-12-20 RX ORDER — TIZANIDINE 4 MG/1
4 TABLET ORAL NIGHTLY PRN
Qty: 30 TABLET | Refills: 2 | Status: SHIPPED | OUTPATIENT
Start: 2021-12-20 | End: 2022-02-21 | Stop reason: SDUPTHER

## 2022-02-07 ENCOUNTER — OFFICE VISIT (OUTPATIENT)
Dept: INTERNAL MEDICINE | Facility: CLINIC | Age: 42
End: 2022-02-07
Payer: COMMERCIAL

## 2022-02-07 VITALS
OXYGEN SATURATION: 99 % | TEMPERATURE: 99 F | DIASTOLIC BLOOD PRESSURE: 70 MMHG | HEIGHT: 69 IN | SYSTOLIC BLOOD PRESSURE: 112 MMHG | WEIGHT: 164.13 LBS | HEART RATE: 75 BPM | BODY MASS INDEX: 24.31 KG/M2

## 2022-02-07 DIAGNOSIS — Z00.00 ANNUAL PHYSICAL EXAM: Primary | ICD-10-CM

## 2022-02-07 DIAGNOSIS — E34.9 TESTOSTERONE DEFICIENCY: ICD-10-CM

## 2022-02-07 DIAGNOSIS — R06.09 DYSPNEA ON EXERTION: ICD-10-CM

## 2022-02-07 DIAGNOSIS — F33.0 MILD RECURRENT MAJOR DEPRESSION: ICD-10-CM

## 2022-02-07 PROCEDURE — 3078F DIAST BP <80 MM HG: CPT | Mod: CPTII,S$GLB,, | Performed by: INTERNAL MEDICINE

## 2022-02-07 PROCEDURE — 1160F PR REVIEW ALL MEDS BY PRESCRIBER/CLIN PHARMACIST DOCUMENTED: ICD-10-PCS | Mod: CPTII,S$GLB,, | Performed by: INTERNAL MEDICINE

## 2022-02-07 PROCEDURE — 99999 PR PBB SHADOW E&M-EST. PATIENT-LVL IV: CPT | Mod: PBBFAC,,, | Performed by: INTERNAL MEDICINE

## 2022-02-07 PROCEDURE — 99999 PR PBB SHADOW E&M-EST. PATIENT-LVL IV: ICD-10-PCS | Mod: PBBFAC,,, | Performed by: INTERNAL MEDICINE

## 2022-02-07 PROCEDURE — 1159F MED LIST DOCD IN RCRD: CPT | Mod: CPTII,S$GLB,, | Performed by: INTERNAL MEDICINE

## 2022-02-07 PROCEDURE — 3008F BODY MASS INDEX DOCD: CPT | Mod: CPTII,S$GLB,, | Performed by: INTERNAL MEDICINE

## 2022-02-07 PROCEDURE — 99395 PREV VISIT EST AGE 18-39: CPT | Mod: S$GLB,,, | Performed by: INTERNAL MEDICINE

## 2022-02-07 PROCEDURE — 3078F PR MOST RECENT DIASTOLIC BLOOD PRESSURE < 80 MM HG: ICD-10-PCS | Mod: CPTII,S$GLB,, | Performed by: INTERNAL MEDICINE

## 2022-02-07 PROCEDURE — 1159F PR MEDICATION LIST DOCUMENTED IN MEDICAL RECORD: ICD-10-PCS | Mod: CPTII,S$GLB,, | Performed by: INTERNAL MEDICINE

## 2022-02-07 PROCEDURE — 99395 PR PREVENTIVE VISIT,EST,18-39: ICD-10-PCS | Mod: S$GLB,,, | Performed by: INTERNAL MEDICINE

## 2022-02-07 PROCEDURE — 3074F PR MOST RECENT SYSTOLIC BLOOD PRESSURE < 130 MM HG: ICD-10-PCS | Mod: CPTII,S$GLB,, | Performed by: INTERNAL MEDICINE

## 2022-02-07 PROCEDURE — 1160F RVW MEDS BY RX/DR IN RCRD: CPT | Mod: CPTII,S$GLB,, | Performed by: INTERNAL MEDICINE

## 2022-02-07 PROCEDURE — 3008F PR BODY MASS INDEX (BMI) DOCUMENTED: ICD-10-PCS | Mod: CPTII,S$GLB,, | Performed by: INTERNAL MEDICINE

## 2022-02-07 PROCEDURE — 3074F SYST BP LT 130 MM HG: CPT | Mod: CPTII,S$GLB,, | Performed by: INTERNAL MEDICINE

## 2022-02-07 RX ORDER — BACLOFEN 20 MG/1
20 TABLET ORAL 3 TIMES DAILY
COMMUNITY
Start: 2022-01-06

## 2022-02-07 NOTE — PROGRESS NOTES
[facility] Note    Chief Complaint      Chief Complaint   Patient presents with    Establish Care       History of Present Illness      Jorge Bae III is a 41 y.o. male with chronic conditions of testosterone deficiency, depression, chronic low back pain who presents today for: establish care and annual preventative visit.   Testosterone deficiency: On testosterone injections.  Sees [facility], Dr. London.  Low back pain: Sees Dr. Vargas.  Tried facet injections which helped for a short while.   Depression: Sees Dr. Bryant, psych.  Previously on adderall.  Doing well on wellbutrin.   Diet: Keto diet.  Drinks plenty water.  Exercise: workouts daily.  P90x insanity.  Weights.  Cardio/peloton.      Denies drinking and driving, drinking more than 4 drinks on occasion, drug use.     Flu shot UTD.  TdAP 2021.  COVID vaccine UTD, discussed booster.  Shingrix due age 52.  Pneumonia vaccine due age 67.  PSA and cscope due age 47.      Past Medical History:  Past Medical History:   Diagnosis Date    Depression     Hematuria     Testosterone deficiency        Past Surgical History:   has a past surgical history that includes deviated septum repair; Esophagogastroduodenoscopy (N/A, 11/11/2020); and Injection of facet joint (Bilateral, 11/3/2021).    Family History:  family history includes Alzheimer's disease in his mother; Early death in his mother; Heart disease in his maternal grandfather; No Known Problems in his father.     Social History:  Social History     Tobacco Use    Smoking status: Never Smoker    Smokeless tobacco: Never Used   Substance Use Topics    Alcohol use: Not Currently     Alcohol/week: 0.0 standard drinks    Drug use: Not Currently     Types: Amphetamines       I personally reviewed all past medical, surgical, social and family history.    Review of Systems   Constitutional: Negative for chills, fever and malaise/fatigue.   HENT: Negative for hearing loss.    Eyes:  Negative for discharge.   Respiratory: Negative for shortness of breath and wheezing.    Cardiovascular: Negative for chest pain and palpitations.   Gastrointestinal: Negative for blood in stool, constipation, diarrhea, nausea and vomiting.   Genitourinary: Negative for hematuria and urgency.   Musculoskeletal: Negative for neck pain.   Skin: Negative for rash.   Neurological: Negative for weakness and headaches.   Endo/Heme/Allergies: Negative for polydipsia.   All other systems reviewed and are negative.       Medications:  Outpatient Encounter Medications as of 2/7/2022   Medication Sig Dispense Refill    buPROPion (WELLBUTRIN XL) 300 MG 24 hr tablet Take 300 mg by mouth once daily.      celecoxib (CELEBREX) 100 MG capsule Take 1 capsule (100 mg total) by mouth 2 (two) times daily. 60 capsule 2    ondansetron (ZOFRAN-ODT) 4 MG TbDL Take 4 mg by mouth every 6 (six) hours as needed.      testosterone cypionate (DEPOTESTOTERONE CYPIONATE) 200 mg/mL injection Inject 150 mg into the muscle every 7 days.      tiZANidine (ZANAFLEX) 4 MG tablet Take 1 tablet (4 mg total) by mouth nightly as needed. 30 tablet 2    baclofen (LIORESAL) 20 MG tablet Take 20 mg by mouth 3 (three) times daily.      [DISCONTINUED] pantoprazole (PROTONIX) 40 MG tablet Take 1 tablet (40 mg total) by mouth 2 (two) times daily. (Patient not taking: No sig reported) 60 tablet 2     No facility-administered encounter medications on file as of 2/7/2022.       Allergies:  Review of patient's allergies indicates:  No Known Allergies    Health Maintenance:  Immunization History   Administered Date(s) Administered    COVID-19, MRNA, LN-S, PF (MODERNA FULL 0.5 ML DOSE) 03/01/2021, 03/29/2021    Influenza - Quadrivalent - MDCK - PF 10/27/2020    Influenza - Quadrivalent - PF *Preferred* (6 months and older) 09/22/2015, 09/27/2016, 10/08/2017, 08/29/2018, 09/27/2019    Tdap 08/29/2018, 09/10/2021      Health Maintenance   Topic Date Due    Lipid  "Panel  02/03/2025    TETANUS VACCINE  09/10/2031    Hepatitis C Screening  Completed        Physical Exam      Vital Signs  Temp: 98.7 °F (37.1 °C)  Pulse: 75  SpO2: 99 %  BP: 112/70  BP Location: Left arm  Patient Position: Sitting  Pain Score: 0-No pain  Height and Weight  Height: 5' 9" (175.3 cm)  Weight: 74.5 kg (164 lb 2.1 oz)  BSA (Calculated - sq m): 1.9 sq meters  BMI (Calculated): 24.2  Weight in (lb) to have BMI = 25: 168.9]    Physical Exam  Vitals reviewed.   Constitutional:       Appearance: He is well-developed and well-nourished.   HENT:      Head: Normocephalic and atraumatic.      Right Ear: External ear normal.      Left Ear: External ear normal.      Mouth/Throat:      Mouth: Oropharynx is clear and moist.   Cardiovascular:      Rate and Rhythm: Normal rate and regular rhythm.      Heart sounds: Normal heart sounds. No murmur heard.      Pulmonary:      Effort: Pulmonary effort is normal.      Breath sounds: Normal breath sounds. No wheezing or rales.   Abdominal:      General: Bowel sounds are normal.      Palpations: Abdomen is soft.          Laboratory:  CBC:  Recent Labs   Lab 02/03/20  0826 02/03/20  0826 11/20/20  0812   WBC 6.29   < > 6.46   RBC 4.89   < > 5.42   Hemoglobin 15.2   < > 16.3   Hematocrit 47.8   < > 52.5   Platelets 261   < > 272   MCV 98   < > 97   MCH 31.1 H   < > 30.1   MCHC 31.8 L  --  31.0 L    < > = values in this interval not displayed.     CMP:  Recent Labs   Lab 02/03/20  0826 02/03/20  0826 11/20/20  0812 11/20/20  0812 05/28/21  1413   Glucose 88   < > 97   < > 82   Calcium 9.5   < > 9.3   < > 9.5   Albumin 4.2   < > 4.3   < > 4.3   Total Protein 7.7   < > 7.5   < > 7.4   Sodium 141   < > 140   < > 136   Potassium 4.4   < > 5.0   < > 4.5   CO2 28   < > 30 H   < > 21 L   Chloride 102   < > 103   < > 104   BUN 22 H   < > 20   < > 21 H   Alkaline Phosphatase 79   < > 82   < > 74   ALT 54 H   < > 49 H   < > 47 H   AST 92 H   < > 54 H   < > 47 H   Total Bilirubin 0.2  " --  0.7  --  1.0    < > = values in this interval not displayed.     URINALYSIS:       LIPIDS:  Recent Labs   Lab 02/03/20  0826 11/20/20 0812   TSH  --  1.978   HDL 61  --    Cholesterol 209 H  --    Triglycerides 72  --    LDL Cholesterol 133.6  --    HDL/Cholesterol Ratio 29.2  --    Non-HDL Cholesterol 148  --    Total Cholesterol/HDL Ratio 3.4  --      TSH:  Recent Labs   Lab 11/20/20  0812   TSH 1.978     A1C:  Recent Labs   Lab 02/03/20 0826   Hemoglobin A1C 4.7       Assessment/Plan     Jorge Bae III is a 41 y.o.male with:    1. Annual physical exam  - CBC Auto Differential; Future  - Comprehensive Metabolic Panel; Future  - Lipid Panel; Future  - TSH; Future  - T4, Free; Future  - Testosterone; Future  Discussed diet and exercise, vaccines and cancer screening, risk factors.  Screening labs ordered.     2. Testosterone deficiency  - Testosterone; Future  Update testosterone. F/U with andrology  3. Mild recurrent major depression  Continue current meds.  F?U with psych  4. Dyspnea on exertion  - Echo; Future  Check Echo.    Chronic conditions status updated as per HPI.  Other than changes above, cont current medications and maintain follow up with specialists.  No follow-ups on file.    No future appointments.    Karl Crowley MD  [facility]        Answers for HPI/ROS submitted by the patient on 2/4/2022  activity change: No  unexpected weight change: No  rhinorrhea: No  trouble swallowing: No  visual disturbance: No  chest tightness: No  polyuria: No  difficulty urinating: No  joint swelling: No  arthralgias: No  confusion: No  dysphoric mood: No

## 2022-02-08 ENCOUNTER — HOSPITAL ENCOUNTER (OUTPATIENT)
Dept: CARDIOLOGY | Facility: OTHER | Age: 42
Discharge: HOME | End: 2022-02-08
Attending: INTERNAL MEDICINE
Payer: COMMERCIAL

## 2022-02-08 VITALS
SYSTOLIC BLOOD PRESSURE: 112 MMHG | BODY MASS INDEX: 24.29 KG/M2 | DIASTOLIC BLOOD PRESSURE: 70 MMHG | WEIGHT: 164 LBS | HEIGHT: 69 IN

## 2022-02-08 DIAGNOSIS — R06.09 DYSPNEA ON EXERTION: ICD-10-CM

## 2022-02-08 LAB
ASCENDING AORTA: 2.72 CM
AV INDEX (PROSTH): 0.81
AV MEAN GRADIENT: 4 MMHG
AV PEAK GRADIENT: 8 MMHG
AV VALVE AREA: 3.24 CM2
AV VELOCITY RATIO: 0.94
BSA FOR ECHO PROCEDURE: 1.9 M2
CV ECHO LV RWT: 0.39 CM
DOP CALC AO PEAK VEL: 1.4 M/S
DOP CALC AO VTI: 26.97 CM
DOP CALC LVOT AREA: 4 CM2
DOP CALC LVOT DIAMETER: 2.25 CM
DOP CALC LVOT PEAK VEL: 1.31 M/S
DOP CALC LVOT STROKE VOLUME: 87.31 CM3
DOP CALCLVOT PEAK VEL VTI: 21.97 CM
E WAVE DECELERATION TIME: 206.54 MSEC
E/A RATIO: 1.65
E/E' RATIO: 5.79 M/S
ECHO LV POSTERIOR WALL: 0.92 CM (ref 0.6–1.1)
EJECTION FRACTION: 65 %
FRACTIONAL SHORTENING: 34 % (ref 28–44)
INTERVENTRICULAR SEPTUM: 0.92 CM (ref 0.6–1.1)
IVRT: 107.27 MSEC
LA MAJOR: 5.47 CM
LA MINOR: 5.19 CM
LA WIDTH: 3.97 CM
LEFT ATRIUM SIZE: 4.04 CM
LEFT ATRIUM VOLUME INDEX MOD: 27.9 ML/M2
LEFT ATRIUM VOLUME INDEX: 38.2 ML/M2
LEFT ATRIUM VOLUME MOD: 53 CM3
LEFT ATRIUM VOLUME: 72.61 CM3
LEFT INTERNAL DIMENSION IN SYSTOLE: 3.15 CM (ref 2.1–4)
LEFT VENTRICLE DIASTOLIC VOLUME INDEX: 55.6 ML/M2
LEFT VENTRICLE DIASTOLIC VOLUME: 105.64 ML
LEFT VENTRICLE MASS INDEX: 79 G/M2
LEFT VENTRICLE SYSTOLIC VOLUME INDEX: 20.8 ML/M2
LEFT VENTRICLE SYSTOLIC VOLUME: 39.44 ML
LEFT VENTRICULAR INTERNAL DIMENSION IN DIASTOLE: 4.76 CM (ref 3.5–6)
LEFT VENTRICULAR MASS: 150.07 G
LV LATERAL E/E' RATIO: 4.2 M/S
LV SEPTAL E/E' RATIO: 9.33 M/S
MV PEAK A VEL: 0.51 M/S
MV PEAK E VEL: 0.84 M/S
MV STENOSIS PRESSURE HALF TIME: 59.9 MS
MV VALVE AREA P 1/2 METHOD: 3.67 CM2
PISA TR MAX VEL: 2.45 M/S
PULM VEIN S/D RATIO: 0.77
PV PEAK D VEL: 0.73 M/S
PV PEAK S VEL: 0.56 M/S
PV PEAK VELOCITY: 1.31 CM/S
RA MAJOR: 4.66 CM
RA PRESSURE: 3 MMHG
RA WIDTH: 3.55 CM
RIGHT VENTRICULAR END-DIASTOLIC DIMENSION: 3.56 CM
RV TISSUE DOPPLER FREE WALL SYSTOLIC VELOCITY 1 (APICAL 4 CHAMBER VIEW): 16.73 CM/S
SINUS: 2.94 CM
STJ: 2.83 CM
TDI LATERAL: 0.2 M/S
TDI SEPTAL: 0.09 M/S
TDI: 0.15 M/S
TR MAX PG: 24 MMHG
TRICUSPID ANNULAR PLANE SYSTOLIC EXCURSION: 1.88 CM
TV REST PULMONARY ARTERY PRESSURE: 27 MMHG

## 2022-02-08 PROCEDURE — 93306 TTE W/DOPPLER COMPLETE: CPT | Mod: 26,,, | Performed by: INTERNAL MEDICINE

## 2022-02-08 PROCEDURE — 93306 ECHO (CUPID ONLY): ICD-10-PCS | Mod: 26,,, | Performed by: INTERNAL MEDICINE

## 2022-02-08 PROCEDURE — 93306 TTE W/DOPPLER COMPLETE: CPT

## 2022-02-14 ENCOUNTER — LAB VISIT (OUTPATIENT)
Dept: LAB | Facility: OTHER | Age: 42
End: 2022-02-14
Attending: INTERNAL MEDICINE
Payer: COMMERCIAL

## 2022-02-14 DIAGNOSIS — E34.9 TESTOSTERONE DEFICIENCY: ICD-10-CM

## 2022-02-14 DIAGNOSIS — Z00.00 ANNUAL PHYSICAL EXAM: ICD-10-CM

## 2022-02-14 LAB
ALBUMIN SERPL BCP-MCNC: 4.4 G/DL (ref 3.5–5.2)
ALP SERPL-CCNC: 75 U/L (ref 55–135)
ALT SERPL W/O P-5'-P-CCNC: 50 U/L (ref 10–44)
ANION GAP SERPL CALC-SCNC: 7 MMOL/L (ref 8–16)
AST SERPL-CCNC: 42 U/L (ref 10–40)
BASOPHILS # BLD AUTO: 0.04 K/UL (ref 0–0.2)
BASOPHILS NFR BLD: 0.5 % (ref 0–1.9)
BILIRUB SERPL-MCNC: 0.6 MG/DL (ref 0.1–1)
BUN SERPL-MCNC: 26 MG/DL (ref 6–20)
CALCIUM SERPL-MCNC: 10.3 MG/DL (ref 8.7–10.5)
CHLORIDE SERPL-SCNC: 106 MMOL/L (ref 95–110)
CHOLEST SERPL-MCNC: 173 MG/DL (ref 120–199)
CHOLEST/HDLC SERPL: 3.5 {RATIO} (ref 2–5)
CO2 SERPL-SCNC: 27 MMOL/L (ref 23–29)
CREAT SERPL-MCNC: 1.1 MG/DL (ref 0.5–1.4)
DIFFERENTIAL METHOD: ABNORMAL
EOSINOPHIL # BLD AUTO: 0.1 K/UL (ref 0–0.5)
EOSINOPHIL NFR BLD: 0.8 % (ref 0–8)
ERYTHROCYTE [DISTWIDTH] IN BLOOD BY AUTOMATED COUNT: 12.6 % (ref 11.5–14.5)
EST. GFR  (AFRICAN AMERICAN): >60 ML/MIN/1.73 M^2
EST. GFR  (NON AFRICAN AMERICAN): >60 ML/MIN/1.73 M^2
GLUCOSE SERPL-MCNC: 94 MG/DL (ref 70–110)
HCT VFR BLD AUTO: 50.2 % (ref 40–54)
HDLC SERPL-MCNC: 49 MG/DL (ref 40–75)
HDLC SERPL: 28.3 % (ref 20–50)
HGB BLD-MCNC: 16.6 G/DL (ref 14–18)
IMM GRANULOCYTES # BLD AUTO: 0.03 K/UL (ref 0–0.04)
IMM GRANULOCYTES NFR BLD AUTO: 0.4 % (ref 0–0.5)
LDLC SERPL CALC-MCNC: 116.8 MG/DL (ref 63–159)
LYMPHOCYTES # BLD AUTO: 1.3 K/UL (ref 1–4.8)
LYMPHOCYTES NFR BLD: 17.1 % (ref 18–48)
MCH RBC QN AUTO: 30.7 PG (ref 27–31)
MCHC RBC AUTO-ENTMCNC: 33.1 G/DL (ref 32–36)
MCV RBC AUTO: 93 FL (ref 82–98)
MONOCYTES # BLD AUTO: 0.5 K/UL (ref 0.3–1)
MONOCYTES NFR BLD: 6.4 % (ref 4–15)
NEUTROPHILS # BLD AUTO: 5.9 K/UL (ref 1.8–7.7)
NEUTROPHILS NFR BLD: 74.8 % (ref 38–73)
NONHDLC SERPL-MCNC: 124 MG/DL
NRBC BLD-RTO: 0 /100 WBC
PLATELET # BLD AUTO: 264 K/UL (ref 150–450)
PMV BLD AUTO: 9.8 FL (ref 9.2–12.9)
POTASSIUM SERPL-SCNC: 5.1 MMOL/L (ref 3.5–5.1)
PROT SERPL-MCNC: 7.4 G/DL (ref 6–8.4)
RBC # BLD AUTO: 5.4 M/UL (ref 4.6–6.2)
SODIUM SERPL-SCNC: 140 MMOL/L (ref 136–145)
T4 FREE SERPL-MCNC: 0.78 NG/DL (ref 0.71–1.51)
TESTOST SERPL-MCNC: 766 NG/DL (ref 304–1227)
TRIGL SERPL-MCNC: 36 MG/DL (ref 30–150)
TSH SERPL DL<=0.005 MIU/L-ACNC: 2.28 UIU/ML (ref 0.4–4)
WBC # BLD AUTO: 7.83 K/UL (ref 3.9–12.7)

## 2022-02-14 PROCEDURE — 85025 COMPLETE CBC W/AUTO DIFF WBC: CPT | Performed by: INTERNAL MEDICINE

## 2022-02-14 PROCEDURE — 84403 ASSAY OF TOTAL TESTOSTERONE: CPT | Performed by: INTERNAL MEDICINE

## 2022-02-14 PROCEDURE — 84439 ASSAY OF FREE THYROXINE: CPT | Performed by: INTERNAL MEDICINE

## 2022-02-14 PROCEDURE — 80061 LIPID PANEL: CPT | Performed by: INTERNAL MEDICINE

## 2022-02-14 PROCEDURE — 84443 ASSAY THYROID STIM HORMONE: CPT | Performed by: INTERNAL MEDICINE

## 2022-02-14 PROCEDURE — 80053 COMPREHEN METABOLIC PANEL: CPT | Performed by: INTERNAL MEDICINE

## 2022-02-15 DIAGNOSIS — R79.89 ELEVATED LFTS: Primary | ICD-10-CM

## 2022-02-15 NOTE — PROGRESS NOTES
Labs look good.  Testosterone levels normal.  Liver enzymes are very slightly elevated consistent with previous levels.  This may be due to keto diet in addition to high-intensity workouts.  To be sure, I am recommending a referral to a hepatologist to see if any additional testing is required.

## 2022-02-16 ENCOUNTER — PATIENT MESSAGE (OUTPATIENT)
Dept: INTERNAL MEDICINE | Facility: CLINIC | Age: 42
End: 2022-02-16
Payer: COMMERCIAL

## 2022-02-21 ENCOUNTER — OFFICE VISIT (OUTPATIENT)
Dept: PAIN MEDICINE | Facility: CLINIC | Age: 42
End: 2022-02-21
Payer: COMMERCIAL

## 2022-02-21 ENCOUNTER — PATIENT MESSAGE (OUTPATIENT)
Dept: PAIN MEDICINE | Facility: CLINIC | Age: 42
End: 2022-02-21

## 2022-02-21 DIAGNOSIS — M54.89 VERTEBROGENIC PAIN: ICD-10-CM

## 2022-02-21 DIAGNOSIS — M46.1 SACROILIITIS: ICD-10-CM

## 2022-02-21 DIAGNOSIS — M47.9 OSTEOARTHRITIS OF SPINE, UNSPECIFIED SPINAL OSTEOARTHRITIS COMPLICATION STATUS, UNSPECIFIED SPINAL REGION: ICD-10-CM

## 2022-02-21 DIAGNOSIS — M70.62 GREATER TROCHANTERIC BURSITIS OF BOTH HIPS: ICD-10-CM

## 2022-02-21 DIAGNOSIS — M70.61 GREATER TROCHANTERIC BURSITIS OF BOTH HIPS: ICD-10-CM

## 2022-02-21 PROCEDURE — 1160F PR REVIEW ALL MEDS BY PRESCRIBER/CLIN PHARMACIST DOCUMENTED: ICD-10-PCS | Mod: CPTII,95,, | Performed by: NURSE PRACTITIONER

## 2022-02-21 PROCEDURE — 1159F PR MEDICATION LIST DOCUMENTED IN MEDICAL RECORD: ICD-10-PCS | Mod: CPTII,95,, | Performed by: NURSE PRACTITIONER

## 2022-02-21 PROCEDURE — 1160F RVW MEDS BY RX/DR IN RCRD: CPT | Mod: CPTII,95,, | Performed by: NURSE PRACTITIONER

## 2022-02-21 PROCEDURE — 99213 PR OFFICE/OUTPT VISIT, EST, LEVL III, 20-29 MIN: ICD-10-PCS | Mod: 95,,, | Performed by: NURSE PRACTITIONER

## 2022-02-21 PROCEDURE — 1159F MED LIST DOCD IN RCRD: CPT | Mod: CPTII,95,, | Performed by: NURSE PRACTITIONER

## 2022-02-21 PROCEDURE — 99213 OFFICE O/P EST LOW 20 MIN: CPT | Mod: 95,,, | Performed by: NURSE PRACTITIONER

## 2022-02-21 RX ORDER — METHYLPREDNISOLONE 4 MG/1
TABLET ORAL
Qty: 1 EACH | Refills: 0 | Status: SHIPPED | OUTPATIENT
Start: 2022-02-21 | End: 2022-03-14

## 2022-02-21 RX ORDER — TIZANIDINE 4 MG/1
4 TABLET ORAL NIGHTLY PRN
Qty: 30 TABLET | Refills: 2 | Status: SHIPPED | OUTPATIENT
Start: 2022-02-21 | End: 2022-05-23

## 2022-02-21 NOTE — PROGRESS NOTES
Chronic Pain-Tele-Medicine-Established Note (Follow up visit)      The patient location is: home  The chief complaint leading to consultation is: low back pain  Visit type: Virtual visit with synchronous audio and video  Total time spent with patient: 15min  Each patient to whom he or she provides medical services by telemedicine is:  (1) informed of the relationship between the physician and patient and the respective role of any other health care provider with respect to management of the patient; and (2) notified that he or she may decline to receive medical services by telemedicine and may withdraw from such care at any time.    Notes:     SUBJECTIVE:    Interval History 2/21/2022:  Mr Bae presents virtually for complaint of lower back pain. He states overall doing fair today. He had exacerbation of pain and voiced concern that while it has improved with left over PO steroid he is going skiing in upcoming weeks. He continues to take baclofen in conjunction with Zanaflex. He denies SE of medications. Denies new areas of pain.     Interval history 12/20/21:  Jorge Bae III presents tele-medicine appointment for a follow-up appointment for low back pain. He is s/p bilateral L4/5 and L5/S1 facet joint injections with 100% relief for ~2 weeks. Since the last visit, Jorge Bae states the pain has been improving. Current pain intensity is 4/10. His low back pain is still present after those two weeks of complete relief but significantly better. He does endorse some lateral hip pain that feels tight, nonradaiting. It can be present on left, right, or both. Stretching and theragun help the tightness. His back pain is worsened by forward flexion and running.       Initial consult 10/28/2021:  Jorge Bae III presents to the clinic for the evaluation of low back pain. The pain started 2-3 years ago following incident of twisting while carrying heavy objects and symptoms have been worsening.The pain is  located in the lower back area and radiates to bilateral buttocks area and back of the thighs.  The pain is described as aching, boring, sharp, shooting and tight band and is rated as 6/10. The pain is rated with a score of  4/10 on the BEST day and a score of 8/10 on the WORST day.  Symptoms interfere with daily activity and sleeping. The pain is exacerbated by Sitting, Standing, Walking, Lifting and Getting out of bed/chair.  The pain is mitigated by heat, ice and medications.  Patient states he had been playing golf and regularly was doing exercises his entire adult life and now his lower extremity pain is affecting his activities of daily living  Patient denies night fever/night sweats, urinary incontinence, bowel incontinence, significant weight loss, significant motor weakness and loss of sensations.     Physical Therapy/Home Exercise: yes   Patient had been in healthy back program and exercise regularly using his PT home exercises     Pain Medications:     - Opioids: none  - Adjuvant Medications: Robaxin ( Methocarbamol) and celebrex  - Anti-Coagulants: none     Patient had tried multiple medical regimens while doing PT and healthy back program with no relief of his back pain             report:  Reviewed and consistent with medication use as prescribed.     Pain Procedures:   11/3/2021: Bilateral L4/5 and L5/S1 facet joint injections - 100% relief for two weeks.      Imaging:   MRI lumbar spine 11/9/2021  FINDINGS:  The lumbar spine demonstrates proper alignment. The vertebral bodies show normal signal intensity and height with no indication of acute fracture or pathologic marrow replacement process.  Multilevel disc desiccation most prominent L5-S1.     The demonstrated portion of the spinal cord is normal in signal intensity at all levels with no indication of myelomalacia or cord edema. Conus terminates at L1.  Evaluation of the surrounding soft tissue structures demonstrates no acute  abnormality.     Degenerative findings:     T12-L1: No significant spinal canal stenosis or neural foraminal narrowing.     L1-L2: No significant spinal canal stenosis or neural foraminal narrowing.     L2-L3: No significant spinal canal stenosis or neural foraminal narrowing.     L3-L4: Mild right facet arthropathy.  No canal or foraminal stenosis.     L4-L5: Disc bulge with central annular fissure.  Bilateral facet arthropathy.  No canal stenosis.  Mild bilateral foraminal stenosis.     L5-S1: Disc bulge with central extrusion and cranial migration.  Mild canal stenosis.  Moderate bilateral foraminal stenosis.  Mild edema in the posterior aspect of the L5 inferior endplate.     Impression:     Multilevel degenerative changes, most advanced at L5-S1 where there is a central disc extrusion, mild canal stenosis, moderate bilateral foraminal stenosis, and bone marrow edema in the L5 inferior endplate.    XR LUMBAR SPINE 5 VIEW WITH FLEX AND EXT 01/2021     CLINICAL HISTORY:  low back pain;  Low back pain     TECHNIQUE:  Five views of the lumbar spine plus flexion extension views were performed.     COMPARISON:  None.     FINDINGS:  Normal sagittal alignment.  No evidence of dynamic instability.  No significant degenerative changes.  Vertebral body heights are maintained.  No acute, displaced fracture or aggressive osseous abnormality.     Impression:     No acute osseous abnormality.        Electronically signed by: Zabrina Kraft  Date:                                            01/09/2021  Time:                                           13:50      Past Medical History:   Diagnosis Date    Depression     Hematuria     Testosterone deficiency      Past Surgical History:   Procedure Laterality Date    deviated septum repair      ESOPHAGOGASTRODUODENOSCOPY N/A 11/11/2020    Procedure: EGD (ESOPHAGOGASTRODUODENOSCOPY);  Surgeon: Yunior Stevens MD;  Location: [facility];  Service: Endoscopy;  Laterality: N/A;     INJECTION OF FACET JOINT Bilateral 11/3/2021    Procedure: INJECTION, FACET JOINT BILATERAL L4/5, L5/S1;  Surgeon: Mateus Vargas MD;  Location: [facility];  Service: Pain Management;  Laterality: Bilateral;     Social History     Socioeconomic History    Marital status:    Tobacco Use    Smoking status: Never Smoker    Smokeless tobacco: Never Used   Substance and Sexual Activity    Alcohol use: Not Currently     Alcohol/week: 0.0 standard drinks    Drug use: Not Currently     Types: Amphetamines    Sexual activity: Yes     Partners: Female     Birth control/protection: None     Comment: partner has Mirena   Social History Narrative    January 4    Zia 16 mos    Works as .     Social Determinants of Health     Financial Resource Strain: Low Risk     Difficulty of Paying Living Expenses: Not hard at all   Food Insecurity: No Food Insecurity    Worried About Running Out of Food in the Last Year: Never true    Ran Out of Food in the Last Year: Never true   Transportation Needs: No Transportation Needs    Lack of Transportation (Medical): No    Lack of Transportation (Non-Medical): No   Physical Activity: Sufficiently Active    Days of Exercise per Week: 7 days    Minutes of Exercise per Session: 60 min   Stress: Stress Concern Present    Feeling of Stress : To some extent   Social Connections: Unknown    Frequency of Communication with Friends and Family: Three times a week    Frequency of Social Gatherings with Friends and Family: Once a week    Active Member of Clubs or Organizations: No    Attends Club or Organization Meetings: More than 4 times per year    Marital Status:    Housing Stability: Low Risk     Unable to Pay for Housing in the Last Year: No    Number of Places Lived in the Last Year: 1    Unstable Housing in the Last Year: No     Family History   Problem Relation Age of Onset    Alzheimer's disease Mother     Early death Mother     No Known  Problems Father     Heart disease Maternal Grandfather        Review of patient's allergies indicates:  No Known Allergies    Current Outpatient Medications   Medication Sig    baclofen (LIORESAL) 20 MG tablet Take 20 mg by mouth 3 (three) times daily.    buPROPion (WELLBUTRIN XL) 300 MG 24 hr tablet Take 300 mg by mouth once daily.    celecoxib (CELEBREX) 100 MG capsule Take 1 capsule (100 mg total) by mouth 2 (two) times daily.    methylPREDNISolone (MEDROL DOSEPACK) 4 mg tablet use as directed    ondansetron (ZOFRAN-ODT) 4 MG TbDL Take 4 mg by mouth every 6 (six) hours as needed.    testosterone cypionate (DEPOTESTOTERONE CYPIONATE) 200 mg/mL injection Inject 150 mg into the muscle every 7 days.    tiZANidine (ZANAFLEX) 4 MG tablet Take 1 tablet (4 mg total) by mouth nightly as needed.     No current facility-administered medications for this visit.       REVIEW OF SYSTEMS:    GENERAL:  No weight loss, malaise or fevers.  HEENT:  Negative for frequent or significant headaches.  NECK:  Negative for lumps, goiter, pain and significant neck swelling.  RESPIRATORY:  Negative for cough, wheezing or shortness of breath.  CARDIOVASCULAR:  Negative for chest pain, leg swelling or palpitations.  GI:  Negative for abdominal discomfort, blood in stools or black stools or change in bowel habits.  MUSCULOSKELETAL:  See HPI.  SKIN:  Negative for lesions, rash, and itching.  PSYCH:  +ve for sleep disturbance, mood disorder and recent psychosocial stressors.  HEMATOLOGY/LYMPHOLOGY:  Negative for prolonged bleeding, bruising easily or swollen nodes.  NEURO:   No history of headaches, syncope, paralysis, seizures or tremors.  All other reviewed and negative other than HPI.    OBJECTIVE:    General appearance: Well appearing, in no acute distress, alert and oriented x3.  Psych:  Mood and affect appropriate.        ASSESSMENT: 41 y.o. year old male with low back pain, consistent with     1. Osteoarthritis of spine,  "unspecified spinal osteoarthritis complication status, unspecified spinal region  tiZANidine (ZANAFLEX) 4 MG tablet   2. Greater trochanteric bursitis of both hips  tiZANidine (ZANAFLEX) 4 MG tablet   3. Vertebrogenic pain  tiZANidine (ZANAFLEX) 4 MG tablet   4. Sacroiliitis  tiZANidine (ZANAFLEX) 4 MG tablet         PLAN:     - Prior records reviewed  - Overall he has reoccurning pain and eased with medication including left over PO steroid  - Will provide Medrol Pk as he has upcoming trip and concerned for exacerbated pain  - Refilled Zanaflex  - Continue Celebrex and Baclofen.   - Will have him FU after return if pain returns for examination  - Consider MBB/RFA vs prior mentioned SIJ &GTB injection   - Also continue with prior plain of care of:     - "May consider intracept in the future as he has endplate Modic changes on MRI at L5"  - I have stressed the importance of physical activity and a home exercise plan to help with pain and improve health.  - Patient can continue with medications for now since they are providing benefits, using them appropriately, and without side effects  - RTC PRN when pain returns after travel.    The above plan and management options were discussed at length with patient. Patient is in agreement with the above and verbalized understanding. It will be communicated with the referring physician via electronic record, fax, or mail.    Darrian Matthews     02/22/2022    "

## 2022-03-25 ENCOUNTER — PATIENT MESSAGE (OUTPATIENT)
Dept: PAIN MEDICINE | Facility: CLINIC | Age: 42
End: 2022-03-25
Payer: COMMERCIAL

## 2022-04-04 ENCOUNTER — OFFICE VISIT (OUTPATIENT)
Dept: HEPATOLOGY | Facility: CLINIC | Age: 42
End: 2022-04-04
Payer: COMMERCIAL

## 2022-04-04 ENCOUNTER — TELEPHONE (OUTPATIENT)
Dept: HEPATOLOGY | Facility: CLINIC | Age: 42
End: 2022-04-04

## 2022-04-04 DIAGNOSIS — R74.8 ELEVATED LIVER ENZYMES: Primary | ICD-10-CM

## 2022-04-04 PROCEDURE — 1159F MED LIST DOCD IN RCRD: CPT | Mod: CPTII,95,, | Performed by: NURSE PRACTITIONER

## 2022-04-04 PROCEDURE — 1160F RVW MEDS BY RX/DR IN RCRD: CPT | Mod: CPTII,95,, | Performed by: NURSE PRACTITIONER

## 2022-04-04 PROCEDURE — 99203 PR OFFICE/OUTPT VISIT, NEW, LEVL III, 30-44 MIN: ICD-10-PCS | Mod: 95,,, | Performed by: NURSE PRACTITIONER

## 2022-04-04 PROCEDURE — 1159F PR MEDICATION LIST DOCUMENTED IN MEDICAL RECORD: ICD-10-PCS | Mod: CPTII,95,, | Performed by: NURSE PRACTITIONER

## 2022-04-04 PROCEDURE — 1160F PR REVIEW ALL MEDS BY PRESCRIBER/CLIN PHARMACIST DOCUMENTED: ICD-10-PCS | Mod: CPTII,95,, | Performed by: NURSE PRACTITIONER

## 2022-04-04 PROCEDURE — 99203 OFFICE O/P NEW LOW 30 MIN: CPT | Mod: 95,,, | Performed by: NURSE PRACTITIONER

## 2022-04-04 NOTE — PATIENT INSTRUCTIONS
1. Fibroscan to look for fat or scar tissue in the liver with return to clinic   2. Will check immunity markers for Hepatitis A and B and arrange for vaccination if needed  3. Labs  soon to  check for multiple causes of liver disease. These labs will release to you as soon as they are resulted but we will discuss them in detail at your upcoming visit to discuss what the lab results mean.   4.  Follow up in May with fibroscan same day, labs and US 1 week before

## 2022-04-04 NOTE — Clinical Note
Please contact pt to schedule f/u with me at next available appt soon with labs and US 1 week before visit, fibroscan same day as visit

## 2022-04-04 NOTE — TELEPHONE ENCOUNTER
----- Message from Nuria Michael NP sent at 4/4/2022  3:49 PM CDT -----  Please contact pt to schedule f/u with me at next available appt soon with labs and US 1 week before visit, fibroscan same day as visit

## 2022-04-04 NOTE — PROGRESS NOTES
"[facility] VISIT NEW PT NOTE    REFERRING PROVIDER:  Dr. Karl Crowley  PCP: Karl Crowley MD     CHIEF COMPLAINT: elevated liver enzymes     HPI: This is a 41 y.o. White male with PMH noted below, presenting for evaluation of elevated liver enzymes    Reports elevated liver enzymes "many years ago", possible fatty liver     Previous serologic w/u negative for  viral hepatitis A, B and C in 2020 - will obtain full sero w/u     Prior serologic workup:   Lab Results   Component Value Date    HEPBSAG Negative 11/20/2020    HEPCAB Negative 11/20/2020    HEPAIGM Negative 11/20/2020     Liver fibrosis staging:  -- fibroscan with RTC    No Risk factors for NAFLD. Previous alcohol use but none in the past year     Interval HPI: Presents today alone via video visit. Recently started Stratera  On TRT ~2 years per Dr. Surya Boykin [facility]   No Herbal medications  Co q10  Resveratrol  Zinc  Acetylcholine   Vitamin D  Magnesium    Labs done 2/2022 show elevated transaminase levels (ALT > AST, elevated since at least 2/2020 but pt reports elevation years before then)  Platelets WNL, alk phos WNL  Synthetic liver functioning WNL    Lab Results   Component Value Date    ALT 50 (H) 02/14/2022    AST 42 (H) 02/14/2022    ALKPHOS 75 02/14/2022    BILITOT 0.6 02/14/2022    ALBUMIN 4.4 02/14/2022     02/14/2022       Abd U/S done 4/2021 showed normal liver     Denies family history of liver disease . Previous alcohol consumption   Social History     Substance and Sexual Activity   Alcohol Use Not Currently    Comment: stopped in 2021, 4-6 times per week, 1/2 wine bottle per night     Immunity to Hep A and B - will check with next labs          Allergy and medication list reviewed and updated     PMHX:  has a past medical history of Depression, Hematuria, and Testosterone deficiency.    PSHX:  has a past surgical history that includes deviated septum repair; Esophagogastroduodenoscopy (N/A, 11/11/2020); " and Injection of facet joint (Bilateral, 11/3/2021).    FAMILY HISTORY: Updated and reviewed in EPIC    SOCIAL HISTORY:   Social History     Substance and Sexual Activity   Alcohol Use Not Currently    Comment: stopped in 2021, 4-6 times per week, 1/2 wine bottle per night       Social History     Substance and Sexual Activity   Drug Use Not Currently    Types: Amphetamines       ROS:   GENERAL: Denies fatigue  CARDIOVASCULAR: Denies edema  GI: Denies abdominal pain  SKIN: Denies rash, itching   NEURO: Denies confusion, memory loss, or mood changes    PHYSICAL EXAM:   Friendly White male, in no acute distress; alert and oriented to person, place and time  VITALS: There were no vitals taken for this visit.  EYES: Sclerae anicteric  GI: Soft, non-tender, non-distended. No ascites.  EXTREMITIES:  No edema.  SKIN: Warm and dry. No jaundice. No telangectasias noted. No palmar erythema.  NEURO:  No asterixis.  PSYCH:  Thought and speech pattern appropriate. Behavior normal      EDUCATION:  See instructions discussed with patient in Instructions section of the After Visit Summary     ASSESSMENT & PLAN:  41 y.o. White male with:  1. Elevated liver enzymes   -- Labs note elevated transaminase levels (ALT > AST), elevated since at least 2020 but pt reports elevation before then years before  --- synthetic liver function WNL  --- Abd US done in 2021 showed normal liver, can repeat   --medications possibly contributing : TRT (?)  --- previous serological work up : Previous serologic w/u negative for  viral hepatitis A, B and C in 2020 - will obtain full sero w/u   --- Hep A and B immunity: will check today, will arrange Hep A and B vaccines if needed    -- labs and US soon  Orders Placed This Encounter   Procedures    FibroScan (Vibration Controlled Transient Elastography)    US Abdomen Complete    Alpha-1-Antitrypsin    CA Screen w/Reflex    Antimitochondrial Antibody    Anti-Smooth Muscle Antibody    Ceruloplasmin     CK    Ferritin    Hepatic Function Panel    IgG    Iron and TIBC    Hepatitis A antibody, IgG    Hepatitis B Core Antibody, Total    Hepatitis B Surface Ab, Qualitative    Hepatitis B Surface Antigen    Hepatitis C Antibody      -- fibroscan with RTC        Follow up in about 1 month (around 5/4/2022). with fibroscan same day before, labs and US 1 week before   Orders Placed This Encounter   Procedures    FibroScan (Vibration Controlled Transient Elastography)    US Abdomen Complete    Alpha-1-Antitrypsin    CA Screen w/Reflex    Antimitochondrial Antibody    Anti-Smooth Muscle Antibody    Ceruloplasmin    CK    Ferritin    Hepatic Function Panel    IgG    Iron and TIBC    Hepatitis A antibody, IgG    Hepatitis B Core Antibody, Total    Hepatitis B Surface Ab, Qualitative    Hepatitis B Surface Antigen    Hepatitis C Antibody        Thank you for allowing me to participate in the care of CLARIBEL Landis    I spent a total of 30 minutes on the day of the visit.This includes face to face time and non-face to face time preparing to see the patient (eg, review of tests), obtaining and/or reviewing separately obtained history, documenting clinical information in the electronic or other health record, independently interpreting results and communicating results to the patient/family/caregiver, and coordinating care.         CC'ed note to:   Karl Crowley MD

## 2022-04-14 ENCOUNTER — PATIENT MESSAGE (OUTPATIENT)
Dept: PAIN MEDICINE | Facility: CLINIC | Age: 42
End: 2022-04-14
Payer: COMMERCIAL

## 2022-04-18 ENCOUNTER — OFFICE VISIT (OUTPATIENT)
Dept: PAIN MEDICINE | Facility: CLINIC | Age: 42
End: 2022-04-18
Attending: ANESTHESIOLOGY
Payer: COMMERCIAL

## 2022-04-18 ENCOUNTER — PATIENT MESSAGE (OUTPATIENT)
Dept: PAIN MEDICINE | Facility: OTHER | Age: 42
End: 2022-04-18
Payer: COMMERCIAL

## 2022-04-18 DIAGNOSIS — M54.89 VERTEBROGENIC PAIN: ICD-10-CM

## 2022-04-18 DIAGNOSIS — M47.897 OTHER SPONDYLOSIS, LUMBOSACRAL REGION: Primary | ICD-10-CM

## 2022-04-18 DIAGNOSIS — M70.60 GREATER TROCHANTERIC BURSITIS, UNSPECIFIED LATERALITY: ICD-10-CM

## 2022-04-18 DIAGNOSIS — M47.819 SPONDYLOSIS WITHOUT MYELOPATHY OR RADICULOPATHY: ICD-10-CM

## 2022-04-18 PROCEDURE — 99214 OFFICE O/P EST MOD 30 MIN: CPT | Mod: 95,,, | Performed by: ANESTHESIOLOGY

## 2022-04-18 PROCEDURE — 1159F PR MEDICATION LIST DOCUMENTED IN MEDICAL RECORD: ICD-10-PCS | Mod: CPTII,95,, | Performed by: ANESTHESIOLOGY

## 2022-04-18 PROCEDURE — 99214 PR OFFICE/OUTPT VISIT, EST, LEVL IV, 30-39 MIN: ICD-10-PCS | Mod: 95,,, | Performed by: ANESTHESIOLOGY

## 2022-04-18 PROCEDURE — 1160F PR REVIEW ALL MEDS BY PRESCRIBER/CLIN PHARMACIST DOCUMENTED: ICD-10-PCS | Mod: CPTII,95,, | Performed by: ANESTHESIOLOGY

## 2022-04-18 PROCEDURE — 1160F RVW MEDS BY RX/DR IN RCRD: CPT | Mod: CPTII,95,, | Performed by: ANESTHESIOLOGY

## 2022-04-18 PROCEDURE — 1159F MED LIST DOCD IN RCRD: CPT | Mod: CPTII,95,, | Performed by: ANESTHESIOLOGY

## 2022-04-18 NOTE — PROGRESS NOTES
Chronic Pain-Tele-Medicine-Established Note (Follow up visit)      The patient location is: Home  The chief complaint leading to consultation is: low back pain  Visit type: Virtual visit with synchronous audio and video  Total time spent with patient: 15 minutes  Each patient to whom he or she provides medical services by telemedicine is:  (1) informed of the relationship between the physician and patient and the respective role of any other health care provider with respect to management of the patient; and (2) notified that he or she may decline to receive medical services by telemedicine and may withdraw from such care at any time.    Notes:     SUBJECTIVE:  Interval History 4/18/2022:  Patient seen for virtual follow-up today.  He reports that his back pain has slowly started to return over the past couple months.  He has been doing some stretching and home exercises without significant improvement.  He is interested in repeating the facet joint injections as they gave him good relief for a few months last time. In addition to his low back pain, he is also having some right lateral hip pain in the area of his GTB.  He denies any new numbness, tingling, weakness, saddle anesthesia, or bowel/bladder dysfunction.    Interval History 2/21/2022:  Mr Bae presents virtually for complaint of lower back pain. He states overall doing fair today. He had exacerbation of pain and voiced concern that while it has improved with left over PO steroid he is going skiing in upcoming weeks. He continues to take baclofen in conjunction with Zanaflex. He denies SE of medications. Denies new areas of pain.      Interval history 12/20/21:  Jorge Bae III presents tele-medicine appointment for a follow-up appointment for low back pain. He is s/p bilateral L4/5 and L5/S1 facet joint injections with 100% relief for ~2 weeks. Since the last visit, Jorge Bae states the pain has been improving. Current pain intensity is  4/10. His low back pain is still present after those two weeks of complete relief but significantly better. He does endorse some lateral hip pain that feels tight, nonradaiting. It can be present on left, right, or both. Stretching and theragun help the tightness. His back pain is worsened by forward flexion and running.        Initial consult 10/28/2021:  Jorge Bae III presents to the clinic for the evaluation of low back pain. The pain started 2-3 years ago following incident of twisting while carrying heavy objects and symptoms have been worsening.The pain is located in the lower back area and radiates to bilateral buttocks area and back of the thighs.  The pain is described as aching, boring, sharp, shooting and tight band and is rated as 6/10. The pain is rated with a score of  4/10 on the BEST day and a score of 8/10 on the WORST day.  Symptoms interfere with daily activity and sleeping. The pain is exacerbated by Sitting, Standing, Walking, Lifting and Getting out of bed/chair.  The pain is mitigated by heat, ice and medications.  Patient states he had been playing golf and regularly was doing exercises his entire adult life and now his lower extremity pain is affecting his activities of daily living  Patient denies night fever/night sweats, urinary incontinence, bowel incontinence, significant weight loss, significant motor weakness and loss of sensations.     Physical Therapy/Home Exercise: yes   Patient had been in healthy back program and exercise regularly using his PT home exercises      Pain Medications:     - Opioids: none  - Adjuvant Medications: Robaxin ( Methocarbamol) and celebrex  - Anti-Coagulants: none     Patient had tried multiple medical regimens while doing PT and healthy back program with no relief of his back pain             report:  Reviewed and consistent with medication use as prescribed.     Pain Procedures:   11/3/2021: Bilateral L4/5 and L5/S1 facet joint injections - 100%  relief for two weeks.       Imaging:  MRI lumbar spine 11/9/2021  FINDINGS:  The lumbar spine demonstrates proper alignment. The vertebral bodies show normal signal intensity and height with no indication of acute fracture or pathologic marrow replacement process.  Multilevel disc desiccation most prominent L5-S1.     The demonstrated portion of the spinal cord is normal in signal intensity at all levels with no indication of myelomalacia or cord edema. Conus terminates at L1.  Evaluation of the surrounding soft tissue structures demonstrates no acute abnormality.     Degenerative findings:     T12-L1: No significant spinal canal stenosis or neural foraminal narrowing.     L1-L2: No significant spinal canal stenosis or neural foraminal narrowing.     L2-L3: No significant spinal canal stenosis or neural foraminal narrowing.     L3-L4: Mild right facet arthropathy.  No canal or foraminal stenosis.     L4-L5: Disc bulge with central annular fissure.  Bilateral facet arthropathy.  No canal stenosis.  Mild bilateral foraminal stenosis.     L5-S1: Disc bulge with central extrusion and cranial migration.  Mild canal stenosis.  Moderate bilateral foraminal stenosis.  Mild edema in the posterior aspect of the L5 inferior endplate.     Impression:     Multilevel degenerative changes, most advanced at L5-S1 where there is a central disc extrusion, mild canal stenosis, moderate bilateral foraminal stenosis, and bone marrow edema in the L5 inferior endplate.     XR LUMBAR SPINE 5 VIEW WITH FLEX AND EXT 01/2021     CLINICAL HISTORY:  low back pain;  Low back pain     TECHNIQUE:  Five views of the lumbar spine plus flexion extension views were performed.     COMPARISON:  None.     FINDINGS:  Normal sagittal alignment.  No evidence of dynamic instability.  No significant degenerative changes.  Vertebral body heights are maintained.  No acute, displaced fracture or aggressive osseous abnormality.     Impression:     No acute  osseous abnormality.        Electronically signed by: Zabrina Sunshineon  Date:                                            01/09/2021  Time:                                           13:50    Past Medical History:   Diagnosis Date    Depression     Hematuria     Testosterone deficiency      Past Surgical History:   Procedure Laterality Date    deviated septum repair      ESOPHAGOGASTRODUODENOSCOPY N/A 11/11/2020    Procedure: EGD (ESOPHAGOGASTRODUODENOSCOPY);  Surgeon: Yunior Stevens MD;  Location: [facility];  Service: Endoscopy;  Laterality: N/A;    INJECTION OF FACET JOINT Bilateral 11/3/2021    Procedure: INJECTION, FACET JOINT BILATERAL L4/5, L5/S1;  Surgeon: Mateus Vargas MD;  Location: [facility];  Service: Pain Management;  Laterality: Bilateral;     Social History     Socioeconomic History    Marital status:    Tobacco Use    Smoking status: Never Smoker    Smokeless tobacco: Never Used   Substance and Sexual Activity    Alcohol use: Not Currently     Comment: stopped in 2021, 4-6 times per week, 1/2 wine bottle per night    Drug use: Not Currently     Types: Amphetamines    Sexual activity: Yes     Partners: Female     Birth control/protection: None     Comment: partner has Mirena   Social History Narrative    January 4    Zia 16 mos    Works as .     Social Determinants of Health     Financial Resource Strain: Low Risk     Difficulty of Paying Living Expenses: Not hard at all   Food Insecurity: No Food Insecurity    Worried About Running Out of Food in the Last Year: Never true    Ran Out of Food in the Last Year: Never true   Transportation Needs: No Transportation Needs    Lack of Transportation (Medical): No    Lack of Transportation (Non-Medical): No   Physical Activity: Sufficiently Active    Days of Exercise per Week: 7 days    Minutes of Exercise per Session: 60 min   Stress: Stress Concern Present    Feeling of Stress : To some extent   Social  Connections: Unknown    Frequency of Communication with Friends and Family: Three times a week    Frequency of Social Gatherings with Friends and Family: Once a week    Active Member of Clubs or Organizations: No    Attends Club or Organization Meetings: More than 4 times per year    Marital Status:    Housing Stability: Low Risk     Unable to Pay for Housing in the Last Year: No    Number of Places Lived in the Last Year: 1    Unstable Housing in the Last Year: No     Family History   Problem Relation Age of Onset    Alzheimer's disease Mother     Early death Mother     No Known Problems Father     Heart disease Maternal Grandfather        Review of patient's allergies indicates:  No Known Allergies    Current Outpatient Medications   Medication Sig    baclofen (LIORESAL) 20 MG tablet Take 20 mg by mouth 3 (three) times daily.    buPROPion (WELLBUTRIN XL) 300 MG 24 hr tablet Take 300 mg by mouth once daily.    ondansetron (ZOFRAN-ODT) 4 MG TbDL Take 4 mg by mouth every 6 (six) hours as needed.    testosterone cypionate (DEPOTESTOTERONE CYPIONATE) 200 mg/mL injection Inject 150 mg into the muscle every 7 days.    tiZANidine (ZANAFLEX) 4 MG tablet Take 1 tablet (4 mg total) by mouth nightly as needed.     No current facility-administered medications for this visit.       REVIEW OF SYSTEMS:    GENERAL:  No weight loss, malaise or fevers.  HEENT:   No recent changes in vision or hearing  NECK:  Negative for lumps, no difficulty with swallowing.  RESPIRATORY:  Negative for cough, wheezing or shortness of breath, patient denies any recent URI.  CARDIOVASCULAR:  Negative for chest pain, leg swelling or palpitations.  GI:  Negative for abdominal discomfort, blood in stools or black stools or change in bowel habits.  MUSCULOSKELETAL:  See HPI.  SKIN:  Negative for lesions, rash, and itching.  PSYCH:  No mood disorder or recent psychosocial stressors.  Patients sleep is not disturbed secondary to  pain.  HEMATOLOGY/LYMPHOLOGY:  Negative for prolonged bleeding, bruising easily or swollen nodes.  Patient is not currently taking any anti-coagulants  NEURO:   No history of headaches, syncope, paralysis, seizures or tremors.  All other reviewed and negative other than HPI.    OBJECTIVE:    General appearance: Well appearing, in no acute distress, alert and oriented x3.  Psych:  Mood and affect appropriate.      ASSESSMENT: 41 y.o. year old male with low back pain, consistent with     1. Other spondylosis, lumbosacral region  Procedure Order to Pain Management   2. Spondylosis without myelopathy or radiculopathy  Procedure Order to Pain Management   3. Greater trochanteric bursitis, unspecified laterality  Procedure Order to Pain Management   4. Vertebrogenic pain           PLAN:     - I have stressed the importance of physical activity and a home exercise plan to help with pain and improve health.  - Schedule for bilateral L4/5 and L5/S1 facet joint injections and Right GTB injection and progress with a Home exercise program.  - If he fails to get adequate relief with facet joint injections for the pain continues to recur, we will consider lumbar medial branch blocks at that time.  - RTC 4 weeks  - Counseled patient regarding the importance of activity modification, constant sleeping habits and physical therapy.    The above plan and management options were discussed at length with patient. Patient is in agreement with the above and verbalized understanding. It will be communicated with the referring physician via electronic record, fax, or mail.    Ken Vazquez MD  [facility], PGY-3    I have personally reviewed the history and personally discussed the plan with patient through video visit and agree with the resident/fellow/NPs note as stated above.    Mateus Vargas MD  4/18/22

## 2022-04-26 ENCOUNTER — HOSPITAL ENCOUNTER (OUTPATIENT)
Dept: RADIOLOGY | Facility: HOSPITAL | Age: 42
Discharge: HOME | End: 2022-04-26
Attending: NURSE PRACTITIONER
Payer: COMMERCIAL

## 2022-04-26 DIAGNOSIS — R74.8 ELEVATED LIVER ENZYMES: ICD-10-CM

## 2022-04-26 PROCEDURE — 76700 US ABDOMEN COMPLETE: ICD-10-PCS | Mod: 26,,, | Performed by: RADIOLOGY

## 2022-04-26 PROCEDURE — 76700 US EXAM ABDOM COMPLETE: CPT | Mod: 26,,, | Performed by: RADIOLOGY

## 2022-04-26 PROCEDURE — 76700 US EXAM ABDOM COMPLETE: CPT | Mod: TC

## 2022-05-04 ENCOUNTER — PATIENT MESSAGE (OUTPATIENT)
Dept: HEPATOLOGY | Facility: CLINIC | Age: 42
End: 2022-05-04
Payer: COMMERCIAL

## 2022-05-06 ENCOUNTER — PROCEDURE VISIT (OUTPATIENT)
Dept: HEPATOLOGY | Facility: CLINIC | Age: 42
End: 2022-05-06
Payer: COMMERCIAL

## 2022-05-06 ENCOUNTER — PATIENT MESSAGE (OUTPATIENT)
Dept: HEPATOLOGY | Facility: CLINIC | Age: 42
End: 2022-05-06

## 2022-05-06 ENCOUNTER — PATIENT OUTREACH (OUTPATIENT)
Dept: ADMINISTRATIVE | Facility: OTHER | Age: 42
End: 2022-05-06
Payer: COMMERCIAL

## 2022-05-06 ENCOUNTER — OFFICE VISIT (OUTPATIENT)
Dept: HEPATOLOGY | Facility: CLINIC | Age: 42
End: 2022-05-06
Payer: COMMERCIAL

## 2022-05-06 VITALS
HEART RATE: 80 BPM | SYSTOLIC BLOOD PRESSURE: 131 MMHG | OXYGEN SATURATION: 98 % | RESPIRATION RATE: 18 BRPM | DIASTOLIC BLOOD PRESSURE: 80 MMHG | WEIGHT: 162.5 LBS | TEMPERATURE: 97 F | HEIGHT: 69 IN | BODY MASS INDEX: 24.07 KG/M2

## 2022-05-06 DIAGNOSIS — R74.8 ELEVATED LIVER ENZYMES: Primary | ICD-10-CM

## 2022-05-06 DIAGNOSIS — Z23 NEED FOR HEPATITIS A VACCINATION: ICD-10-CM

## 2022-05-06 DIAGNOSIS — R74.8 ELEVATED CK: ICD-10-CM

## 2022-05-06 DIAGNOSIS — R74.8 ELEVATED LIVER ENZYMES: ICD-10-CM

## 2022-05-06 PROCEDURE — 91200 LIVER ELASTOGRAPHY: CPT | Mod: S$GLB,,, | Performed by: NURSE PRACTITIONER

## 2022-05-06 PROCEDURE — 3008F PR BODY MASS INDEX (BMI) DOCUMENTED: ICD-10-PCS | Mod: CPTII,S$GLB,, | Performed by: NURSE PRACTITIONER

## 2022-05-06 PROCEDURE — 3079F DIAST BP 80-89 MM HG: CPT | Mod: CPTII,S$GLB,, | Performed by: NURSE PRACTITIONER

## 2022-05-06 PROCEDURE — 3075F SYST BP GE 130 - 139MM HG: CPT | Mod: CPTII,S$GLB,, | Performed by: NURSE PRACTITIONER

## 2022-05-06 PROCEDURE — 1160F RVW MEDS BY RX/DR IN RCRD: CPT | Mod: CPTII,S$GLB,, | Performed by: NURSE PRACTITIONER

## 2022-05-06 PROCEDURE — 3075F PR MOST RECENT SYSTOLIC BLOOD PRESS GE 130-139MM HG: ICD-10-PCS | Mod: CPTII,S$GLB,, | Performed by: NURSE PRACTITIONER

## 2022-05-06 PROCEDURE — 1159F MED LIST DOCD IN RCRD: CPT | Mod: CPTII,S$GLB,, | Performed by: NURSE PRACTITIONER

## 2022-05-06 PROCEDURE — 99214 OFFICE O/P EST MOD 30 MIN: CPT | Mod: S$GLB,,, | Performed by: NURSE PRACTITIONER

## 2022-05-06 PROCEDURE — 3008F BODY MASS INDEX DOCD: CPT | Mod: CPTII,S$GLB,, | Performed by: NURSE PRACTITIONER

## 2022-05-06 PROCEDURE — 99214 PR OFFICE/OUTPT VISIT, EST, LEVL IV, 30-39 MIN: ICD-10-PCS | Mod: S$GLB,,, | Performed by: NURSE PRACTITIONER

## 2022-05-06 PROCEDURE — 1160F PR REVIEW ALL MEDS BY PRESCRIBER/CLIN PHARMACIST DOCUMENTED: ICD-10-PCS | Mod: CPTII,S$GLB,, | Performed by: NURSE PRACTITIONER

## 2022-05-06 PROCEDURE — 99999 PR PBB SHADOW E&M-EST. PATIENT-LVL IV: ICD-10-PCS | Mod: PBBFAC,,, | Performed by: NURSE PRACTITIONER

## 2022-05-06 PROCEDURE — 99999 PR PBB SHADOW E&M-EST. PATIENT-LVL IV: CPT | Mod: PBBFAC,,, | Performed by: NURSE PRACTITIONER

## 2022-05-06 PROCEDURE — 91200 FIBROSCAN (VIBRATION CONTROLLED TRANSIENT ELASTOGRAPHY): ICD-10-PCS | Mod: S$GLB,,, | Performed by: NURSE PRACTITIONER

## 2022-05-06 PROCEDURE — 3079F PR MOST RECENT DIASTOLIC BLOOD PRESSURE 80-89 MM HG: ICD-10-PCS | Mod: CPTII,S$GLB,, | Performed by: NURSE PRACTITIONER

## 2022-05-06 PROCEDURE — 1159F PR MEDICATION LIST DOCUMENTED IN MEDICAL RECORD: ICD-10-PCS | Mod: CPTII,S$GLB,, | Performed by: NURSE PRACTITIONER

## 2022-05-06 RX ORDER — CELECOXIB 200 MG/1
200 CAPSULE ORAL DAILY PRN
COMMUNITY
Start: 2022-04-21 | End: 2023-10-23

## 2022-05-06 RX ORDER — ATOMOXETINE 25 MG/1
25 CAPSULE ORAL 2 TIMES DAILY
COMMUNITY
Start: 2022-03-18

## 2022-05-06 NOTE — PATIENT INSTRUCTIONS
1. Fibroscan to look for fat or scar tissue in the liver showed no scar tissue or fatty liver  2. Your muscle breakdown test is elevated. That may be the reason that your liver enzymes are elevated sometimes   3.  Recommend vaccines for Hepatitis  A, see below   4. WIll refer you to rheumatology if your CK remains elevated on repeat in 1 month   5.  Follow up in 2 years with labs a few days before, fibroscan same day       HEP A VACCINE  Your labs show that you DO have immunity against Hep B (+ Hep B surface antibody), so no further vaccine needed for Hep B    However, your immunity markers show that you do NOT have immunity against Hepatitis A, so I recommend that you receive the Hepatitis A vaccine. Hep A can be transmitted through food and water and can cause significant liver injury. There was previously a significant Hepatitis A outbreak in [place]   This will protect your liver from the virus, which can make your liver very sick. The vaccine series is 2 vaccines: one now and one 6 months after the 1st one. I sent the vaccine to the [facility] pharmacy. I recommend calling them in a couple of days to see if the vaccine is covered by your insurance and arrange the vaccines if they are covered. Their number is [phone].      If the vaccine is not covered at the pharmacy level, I sent an order that you can get the vaccine in the infectious disease department at [facility]. If that is the case, please call [phone]  to schedule your vaccine administration appointment in the infectious disease department.  If you need to proceed with vaccines with the infectious disease department, you can call to obtain a cost for these vaccines before you proceed, you can call the [facility] office at [phone] or [phone]

## 2022-05-06 NOTE — PROCEDURES
FibroScan (Vibration Controlled Transient Elastography)    Date/Time: 5/6/2022 2:45 PM  Performed by: Nuria Michael NP  Authorized by: Nuria Michael NP     Diagnosis:  Other    Probe:  M    Universal Protocol: Patient's identity, procedure and site were verified, confirmatory pause was performed.  Discussed procedure including risks and potential complications.  Questions answered.  Patient verbalizes understanding and wishes to proceed with VCTE.     Procedure: After providing explanations of the procedure, patient was placed in the supine position with right arm in maximum abduction to allow optimal exposure of right lateral abdomen.  Patient was briefly assessed, Testing was performed in the mid-axillary location, 50Hz Shear Wave pulses were applied and the resulting Shear Wave and Propagation Speed detected with a 3.5 MHz ultrasonic signal, using the FibroScan probe, Skin to liver capsule distance and liver parenchyma were accessed during the entire examination with the FibroScan probe, Patient was instructed to breathe normally and to abstain from sudden movements during the procedure, allowing for random measurements of liver stiffness. At least 10 Shear Waves were produced, Individual measurements of each Shear Wave were calculated.  Patient tolerated the procedure well with no complications.  Meets discharge criteria as was dismissed.  Rates pain 0 out of 10.  Patient will follow up with ordering provider to review results.      Findings  Median liver stiffness score:  5.6  CAP Reading: dB/m:  163    IQR/med %:  13  Interpretation  Fibrosis interpretation is based on medial liver stiffness - Kilopascal (kPa).    Fibrosis Stage:  F 0-1  Steatosis interpretation is based on controlled attenuation parameter - (dB/m).    Steatosis Grade:  <S1

## 2022-05-06 NOTE — PROGRESS NOTES
Health Maintenance Due   Topic Date Due    COVID-19 Vaccine (3 - Booster for Moderna series) 08/29/2021     Updates were requested from care everywhere.  Chart was reviewed for overdue Proactive [facility] Encounters ([facility]) topics (CRS, Breast Cancer Screening, Eye exam)  Health Maintenance has been updated.  [facility] immunization registry triggered.  Immunizations were reconciled.

## 2022-05-06 NOTE — PROGRESS NOTES
"[facility] VISIT FOLLOW UP NOTE    PCP: Karl Crowley MD     CHIEF COMPLAINT: elevated liver enzymes, elevated CK    HPI: This is a 41 y.o. White male with PMH noted below, presenting for follow up of elevated liver enzymes    Reports elevated liver enzymes "many years ago", possible fatty liver     Serological workup was negative for Fritz's, alpha-1 antitrypsin deficiency, hemochromatosis, autoimmune etiology, and viral hepatitis A, B and C.   Elevated CK    Prior serologic workup:   Lab Results   Component Value Date    SMOOTHMUSCAB Negative 1:40 04/26/2022    AMAIFA Negative 1:40 04/26/2022    IGGSERUM 1165 04/26/2022    ANASCREEN Negative <1:80 04/26/2022    FERRITIN 190 04/26/2022    FESATURATED 12 (L) 04/26/2022    CERULOPLSM 30.0 04/26/2022    HEPBSAG Negative 04/26/2022    HEPCAB Negative 04/26/2022    HEPAIGM Negative 11/20/2020     Liver fibrosis staging:  -- fibroscan 5/2022 noted F0, S0 (kPA 5.6, )    No Risk factors for NAFLD. Previous alcohol use but none in the past year     Interval HPI: Presents today alone. Recent liver enzymes near normal. Does exercise regularly: Weight lifting, HIIT 7 days per week, 1.5 hours per day  No Herbal medications, taking the following   Co q10  Resveratrol  Zinc  Acetylcholine   Vitamin D  Magnesium    Labs done 4/2022 show  Near normal transaminase levels (previously ALT > AST, elevated 2/2020 - 2/2022 but pt reports years of elevated liver enzymes on outside labs)  Platelets WNL, alk phos WNL  Synthetic liver functioning WNL    Lab Results   Component Value Date    ALT 36 04/26/2022    AST 37 04/26/2022    ALKPHOS 68 04/26/2022    BILITOT 1.1 (H) 04/26/2022    ALBUMIN 4.3 04/26/2022     02/14/2022     Abd U/S done 4/2022 showed normal liver and spleen     Denies family history of liver disease . Previous alcohol consumption   Social History     Substance and Sexual Activity   Alcohol Use Not Currently    Comment: stopped in 2021, 4-6 " "times per week, 1/2 wine bottle per night     + Immunity to Hep B - needs Hep A vaccine, sent to [facility] pharm and iD         Allergy and medication list reviewed and updated     PMHX:  has a past medical history of Depression, Hematuria, and Testosterone deficiency.    PSHX:  has a past surgical history that includes deviated septum repair; Esophagogastroduodenoscopy (N/A, 11/11/2020); and Injection of facet joint (Bilateral, 11/3/2021).    FAMILY HISTORY: Updated and reviewed in [facility]    SOCIAL HISTORY:   Social History     Substance and Sexual Activity   Alcohol Use Not Currently    Comment: stopped in 2021, 4-6 times per week, 1/2 wine bottle per night       Social History     Substance and Sexual Activity   Drug Use Not Currently    Types: Amphetamines       ROS:   GENERAL: Denies fatigue  CARDIOVASCULAR: Denies edema  GI: Denies abdominal pain  SKIN: Denies rash, itching   NEURO: Denies confusion, memory loss, or mood changes    PHYSICAL EXAM:   Friendly White male, in no acute distress; alert and oriented to person, place and time  VITALS: /80 (BP Location: Right arm, Patient Position: Sitting, BP Method: Medium (Automatic))   Pulse 80   Temp 97.2 °F (36.2 °C) (Oral)   Resp 18   Ht 5' 9" (1.753 m)   Wt 73.7 kg (162 lb 7.7 oz)   SpO2 98%   BMI 23.99 kg/m²   EYES: Sclerae anicteric  GI: Soft, non-tender, non-distended. No ascites.  EXTREMITIES:  No edema.  SKIN: Warm and dry. No jaundice. No telangectasias noted. No palmar erythema.  NEURO:  No asterixis.  PSYCH:  Thought and speech pattern appropriate. Behavior normal      EDUCATION:  See instructions discussed with patient in Instructions section of the After Visit Summary     ASSESSMENT & PLAN:  41 y.o. White male with:  1. Elevated liver enzymes   -- do not suspect r/t underlying liver disease. Sero w/u and fibroscan normal, US normal. May be r/t CK elevation   -- Labs note elevated transaminase levels (ALT > AST), elevated since at least 2020 but " pt reports elevation before then years before  --- synthetic liver function WNL  --- Abd US done in 2022 showed normal liver and spleen   --- Serological workup was negative for Fritz's, alpha-1 antitrypsin deficiency, hemochromatosis, autoimmune etiology, and viral hepatitis A, B and C.   Elevated CK  --- Hep A and B immunity: needs HEp A vaccine, sent to [facility] pharm and ID  -- fibroscan WNL    2. Elevated CK  -- likely contributing to elevated liver enzymes intermittently  -- repeat CK in 2 months. If remains elevated will defer to PCP if further w/u needed         No follow-ups on file. Given normal lab values, no fibrosis on fibroscan - no need for hepatology f/u. Recommend f/u yearly with PCP with liver labs and to schedule f/u appt if liver enzymes increase above normal in the future. If liver enzymes remain elevated in future (remain elevated despite normalization of CK), can arrange for fibroscan q2 years, recall placed     No orders of the defined types were placed in this encounter.       Thank you for allowing me to participate in the care of CLARIBEL Landis    I spent a total of 30 minutes on the day of the visit.This includes face to face time and non-face to face time preparing to see the patient (eg, review of tests), obtaining and/or reviewing separately obtained history, documenting clinical information in the electronic or other health record, independently interpreting results and communicating results to the patient/family/caregiver, and coordinating care.         CC'ed note to:   Karl Crowley MD

## 2022-05-06 NOTE — Clinical Note
Workup negative, fibroscan normal, do not suspect chronic underlying liver disease. Liver enzymes may be elevated due to elevated CK. I am repeating in 2 months so I update you if it remains elevated No hepatology f/u needed. If liver enzymes increase again on routine labs, can return for a f/u with fibroscan Thanks ! hypoactive

## 2022-05-09 ENCOUNTER — PATIENT MESSAGE (OUTPATIENT)
Dept: PAIN MEDICINE | Facility: OTHER | Age: 42
End: 2022-05-09
Payer: COMMERCIAL

## 2022-05-09 ENCOUNTER — PATIENT MESSAGE (OUTPATIENT)
Dept: HEPATOLOGY | Facility: CLINIC | Age: 42
End: 2022-05-09
Payer: COMMERCIAL

## 2022-05-09 ENCOUNTER — PATIENT MESSAGE (OUTPATIENT)
Dept: PAIN MEDICINE | Facility: CLINIC | Age: 42
End: 2022-05-09
Payer: COMMERCIAL

## 2022-05-20 ENCOUNTER — PATIENT MESSAGE (OUTPATIENT)
Dept: HEPATOLOGY | Facility: CLINIC | Age: 42
End: 2022-05-20
Payer: COMMERCIAL

## 2022-06-02 ENCOUNTER — TELEPHONE (OUTPATIENT)
Dept: PAIN MEDICINE | Facility: CLINIC | Age: 42
End: 2022-06-02
Payer: COMMERCIAL

## 2022-06-02 NOTE — TELEPHONE ENCOUNTER
Staff lvm in regards to appt on 6/9/22 being cancelled with  due to procedure being cancelled but if pt want a visit for other reasons to give the office a call @ [phone].

## 2022-06-29 ENCOUNTER — LAB VISIT (OUTPATIENT)
Dept: LAB | Facility: HOSPITAL | Age: 42
End: 2022-06-29
Attending: INTERNAL MEDICINE
Payer: COMMERCIAL

## 2022-06-29 DIAGNOSIS — R74.8 ELEVATED LIVER ENZYMES: ICD-10-CM

## 2022-06-29 DIAGNOSIS — R74.8 ELEVATED CK: ICD-10-CM

## 2022-06-29 LAB
ALBUMIN SERPL BCP-MCNC: 4.3 G/DL (ref 3.5–5.2)
ALP SERPL-CCNC: 66 U/L (ref 55–135)
ALT SERPL W/O P-5'-P-CCNC: 47 U/L (ref 10–44)
AST SERPL-CCNC: 43 U/L (ref 10–40)
BILIRUB DIRECT SERPL-MCNC: 0.4 MG/DL (ref 0.1–0.3)
BILIRUB SERPL-MCNC: 1 MG/DL (ref 0.1–1)
CK SERPL-CCNC: 761 U/L (ref 20–200)
PROT SERPL-MCNC: 7.3 G/DL (ref 6–8.4)

## 2022-06-29 PROCEDURE — 36415 COLL VENOUS BLD VENIPUNCTURE: CPT | Mod: PO | Performed by: NURSE PRACTITIONER

## 2022-06-29 PROCEDURE — 82550 ASSAY OF CK (CPK): CPT | Performed by: NURSE PRACTITIONER

## 2022-06-29 PROCEDURE — 80076 HEPATIC FUNCTION PANEL: CPT | Performed by: NURSE PRACTITIONER

## 2022-06-30 ENCOUNTER — PATIENT MESSAGE (OUTPATIENT)
Dept: HEPATOLOGY | Facility: CLINIC | Age: 42
End: 2022-06-30
Payer: COMMERCIAL

## 2022-06-30 DIAGNOSIS — R74.8 ELEVATED CK: Primary | ICD-10-CM

## 2022-07-19 ENCOUNTER — PATIENT MESSAGE (OUTPATIENT)
Dept: PAIN MEDICINE | Facility: CLINIC | Age: 42
End: 2022-07-19
Payer: COMMERCIAL

## 2022-07-20 ENCOUNTER — PATIENT MESSAGE (OUTPATIENT)
Dept: SURGERY | Facility: HOSPITAL | Age: 42
End: 2022-07-20
Payer: COMMERCIAL

## 2022-07-20 ENCOUNTER — OFFICE VISIT (OUTPATIENT)
Dept: PAIN MEDICINE | Facility: CLINIC | Age: 42
End: 2022-07-20
Payer: COMMERCIAL

## 2022-07-20 ENCOUNTER — TELEPHONE (OUTPATIENT)
Dept: SURGERY | Facility: HOSPITAL | Age: 42
End: 2022-07-20
Payer: COMMERCIAL

## 2022-07-20 ENCOUNTER — TELEPHONE (OUTPATIENT)
Dept: PAIN MEDICINE | Facility: OTHER | Age: 42
End: 2022-07-20
Payer: COMMERCIAL

## 2022-07-20 VITALS
HEART RATE: 70 BPM | DIASTOLIC BLOOD PRESSURE: 76 MMHG | BODY MASS INDEX: 24.48 KG/M2 | WEIGHT: 165.25 LBS | SYSTOLIC BLOOD PRESSURE: 138 MMHG | HEIGHT: 69 IN

## 2022-07-20 DIAGNOSIS — M54.16 LUMBAR RADICULOPATHY: Primary | ICD-10-CM

## 2022-07-20 PROCEDURE — 99214 PR OFFICE/OUTPT VISIT, EST, LEVL IV, 30-39 MIN: ICD-10-PCS | Mod: S$GLB,,, | Performed by: ANESTHESIOLOGY

## 2022-07-20 PROCEDURE — 99999 PR PBB SHADOW E&M-EST. PATIENT-LVL IV: ICD-10-PCS | Mod: PBBFAC,,, | Performed by: ANESTHESIOLOGY

## 2022-07-20 PROCEDURE — 1159F PR MEDICATION LIST DOCUMENTED IN MEDICAL RECORD: ICD-10-PCS | Mod: CPTII,S$GLB,, | Performed by: ANESTHESIOLOGY

## 2022-07-20 PROCEDURE — 3078F DIAST BP <80 MM HG: CPT | Mod: CPTII,S$GLB,, | Performed by: ANESTHESIOLOGY

## 2022-07-20 PROCEDURE — 3008F PR BODY MASS INDEX (BMI) DOCUMENTED: ICD-10-PCS | Mod: CPTII,S$GLB,, | Performed by: ANESTHESIOLOGY

## 2022-07-20 PROCEDURE — 3008F BODY MASS INDEX DOCD: CPT | Mod: CPTII,S$GLB,, | Performed by: ANESTHESIOLOGY

## 2022-07-20 PROCEDURE — 3075F SYST BP GE 130 - 139MM HG: CPT | Mod: CPTII,S$GLB,, | Performed by: ANESTHESIOLOGY

## 2022-07-20 PROCEDURE — 99214 OFFICE O/P EST MOD 30 MIN: CPT | Mod: S$GLB,,, | Performed by: ANESTHESIOLOGY

## 2022-07-20 PROCEDURE — 99999 PR PBB SHADOW E&M-EST. PATIENT-LVL IV: CPT | Mod: PBBFAC,,, | Performed by: ANESTHESIOLOGY

## 2022-07-20 PROCEDURE — 1159F MED LIST DOCD IN RCRD: CPT | Mod: CPTII,S$GLB,, | Performed by: ANESTHESIOLOGY

## 2022-07-20 PROCEDURE — 3075F PR MOST RECENT SYSTOLIC BLOOD PRESS GE 130-139MM HG: ICD-10-PCS | Mod: CPTII,S$GLB,, | Performed by: ANESTHESIOLOGY

## 2022-07-20 PROCEDURE — 3078F PR MOST RECENT DIASTOLIC BLOOD PRESSURE < 80 MM HG: ICD-10-PCS | Mod: CPTII,S$GLB,, | Performed by: ANESTHESIOLOGY

## 2022-07-20 PROCEDURE — 1160F PR REVIEW ALL MEDS BY PRESCRIBER/CLIN PHARMACIST DOCUMENTED: ICD-10-PCS | Mod: CPTII,S$GLB,, | Performed by: ANESTHESIOLOGY

## 2022-07-20 PROCEDURE — 1160F RVW MEDS BY RX/DR IN RCRD: CPT | Mod: CPTII,S$GLB,, | Performed by: ANESTHESIOLOGY

## 2022-07-20 RX ORDER — SODIUM CHLORIDE, SODIUM LACTATE, POTASSIUM CHLORIDE, CALCIUM CHLORIDE 600; 310; 30; 20 MG/100ML; MG/100ML; MG/100ML; MG/100ML
INJECTION, SOLUTION INTRAVENOUS CONTINUOUS
Status: CANCELLED | OUTPATIENT
Start: 2022-07-20

## 2022-07-20 RX ORDER — HYDROCODONE BITARTRATE AND ACETAMINOPHEN 5; 325 MG/1; MG/1
1 TABLET ORAL EVERY 8 HOURS PRN
Qty: 15 TABLET | Refills: 0 | Status: SHIPPED | OUTPATIENT
Start: 2022-07-20 | End: 2022-07-20

## 2022-07-20 NOTE — TELEPHONE ENCOUNTER
No, I did not tell him to take Celebrex.    He should not take Celebrex.    He can take ibuprofen 600 mg 3 times a day with Tylenol as needed today and tomorrow.  Do not take any day of procedure.    I will cancel the pain medicine prescription

## 2022-07-20 NOTE — PROGRESS NOTES
[facility] Follow Up Evaluation      CC:   Chief Complaint   Patient presents with    Back Pain      Last 3 PDI Scores 7/20/2022 10/28/2021   Pain Disability Index (PDI) 40 19       HPI:   Jorge Bae is a 41 y.o. male who presents with back pain.  He has had chronic lower back pain for the past few years however he reports that a couple days ago he was playing golf and experienced sharp, sudden severe lower back pain.  Today he reports his pain is 8/10, aching, sharp with radiation into his bilateral buttocks and down the back of his his thighs.  He denies any numbness or weakness.  His pain is worse with standing and walking and relieved with sitting and lying down.    History:    Current Outpatient Medications:     atomoxetine (STRATTERA) 25 MG capsule, Take 25 mg by mouth 2 (two) times daily., Disp: , Rfl:     baclofen (LIORESAL) 20 MG tablet, Take 20 mg by mouth 3 (three) times daily., Disp: , Rfl:     buPROPion (WELLBUTRIN XL) 300 MG 24 hr tablet, Take 300 mg by mouth once daily., Disp: , Rfl:     celecoxib (CELEBREX) 200 MG capsule, Take 200 mg by mouth daily as needed., Disp: , Rfl:     ondansetron (ZOFRAN-ODT) 4 MG TbDL, Take 4 mg by mouth every 6 (six) hours as needed., Disp: , Rfl:     testosterone cypionate (DEPOTESTOTERONE CYPIONATE) 200 mg/mL injection, Inject 150 mg into the muscle every 7 days., Disp: , Rfl:     tiZANidine (ZANAFLEX) 4 MG tablet, TAKE 1 TABLET BY MOUTH NIGHTLY AS NEEDED., Disp: 90 tablet, Rfl: 2    HYDROcodone-acetaminophen (NORCO) 5-325 mg per tablet, Take 1 tablet by mouth every 8 (eight) hours as needed for Pain., Disp: 15 tablet, Rfl: 0    Past Medical History:   Diagnosis Date    Depression     Hematuria     Testosterone deficiency        Past Surgical History:   Procedure Laterality Date    deviated septum repair      ESOPHAGOGASTRODUODENOSCOPY N/A 11/11/2020    Procedure: EGD (ESOPHAGOGASTRODUODENOSCOPY);  Surgeon: Yunior Stevens MD;  Location:  [facility];  Service: Endoscopy;  Laterality: N/A;    INJECTION OF FACET JOINT Bilateral 11/3/2021    Procedure: INJECTION, FACET JOINT BILATERAL L4/5, L5/S1;  Surgeon: Mateus Vargas MD;  Location: [facility];  Service: Pain Management;  Laterality: Bilateral;       Family History   Problem Relation Age of Onset    Alzheimer's disease Mother     Early death Mother     No Known Problems Father     Heart disease Maternal Grandfather        Social History     Socioeconomic History    Marital status:    Tobacco Use    Smoking status: Never Smoker    Smokeless tobacco: Never Used   Substance and Sexual Activity    Alcohol use: Not Currently     Comment: stopped in 2021, 4-6 times per week, 1/2 wine bottle per night    Drug use: Not Currently     Types: Amphetamines    Sexual activity: Yes     Partners: Female     Birth control/protection: None     Comment: partner has Mirena   Social History Narrative    January 4    Zia 16 mos    Works as .     Social Determinants of Health     Financial Resource Strain: Low Risk     Difficulty of Paying Living Expenses: Not hard at all   Food Insecurity: No Food Insecurity    Worried About Running Out of Food in the Last Year: Never true    Ran Out of Food in the Last Year: Never true   Transportation Needs: No Transportation Needs    Lack of Transportation (Medical): No    Lack of Transportation (Non-Medical): No   Physical Activity: Sufficiently Active    Days of Exercise per Week: 7 days    Minutes of Exercise per Session: 60 min   Stress: Stress Concern Present    Feeling of Stress : To some extent   Social Connections: Unknown    Frequency of Communication with Friends and Family: Three times a week    Frequency of Social Gatherings with Friends and Family: Once a week    Active Member of Clubs or Organizations: No    Attends Club or Organization Meetings: More than 4 times per year    Marital Status:    Housing  "Stability: Low Risk     Unable to Pay for Housing in the Last Year: No    Number of Places Lived in the Last Year: 1    Unstable Housing in the Last Year: No       Review of patient's allergies indicates:  No Known Allergies    Review of Systems:  General ROS: negative for - fever  Psychological ROS: negative for - hostility  Hematological and Lymphatic ROS: negative for - bleeding problems  Endocrine ROS: negative for - unexpected weight changes  Respiratory ROS: no cough, shortness of breath, or wheezing  Cardiovascular ROS: no chest pain or dyspnea on exertion  Gastrointestinal ROS: no abdominal pain, change in bowel habits, or black or bloody stools  Musculoskeletal ROS: negative for - muscular weakness  Neurological ROS: negative for - numbness/tingling  Dermatological ROS: negative for rash    Physical Exam:  Vitals:    07/20/22 1157   BP: 138/76   Pulse: 70   Weight: 75 kg (165 lb 3.8 oz)   Height: 5' 9" (1.753 m)   PainSc:   8     Body mass index is 24.4 kg/m².    Gen: NAD  Gait: gait intact  Psych:  Mood appropriate for given condition  HEENT: eyes anicteric   GI: Abd soft  CV: RRR  Lungs: breathing unlabored   ROM: limited AROM of the L spine in all planes, full ROM at ankles, knees and hips  Sensation: intact to light touch in all dermatomes tested from L2-S1 bilaterally  Reflexes: 0 b/l patella and achilles  Tone: normal in the b/l knees and hips   Skin: intact  Extremities: No edema in b/l ankles or hands  Provacative tests:        Right Left   L2/3 Iliacus Hip flexion  5  5   L3/4 Qudratus Femoris Knee Extension  5  5   L4/5 Tib Anterior Ankle Dorsiflexion   5  5   L5/S1 Extensor Hallicus Longus Great toe extension  5  5                 S1/S2 Gastroc/Soleus Plantar Flexion  5  5       Imaging:  MRI lumbar spine 11/9/21  FINDINGS:  The lumbar spine demonstrates proper alignment. The vertebral bodies show normal signal intensity and height with no indication of acute fracture or pathologic marrow " replacement process.  Multilevel disc desiccation most prominent L5-S1.     The demonstrated portion of the spinal cord is normal in signal intensity at all levels with no indication of myelomalacia or cord edema. Conus terminates at L1.  Evaluation of the surrounding soft tissue structures demonstrates no acute abnormality.     Degenerative findings:     T12-L1: No significant spinal canal stenosis or neural foraminal narrowing.  L1-L2: No significant spinal canal stenosis or neural foraminal narrowing.  L2-L3: No significant spinal canal stenosis or neural foraminal narrowing.  L3-L4: Mild right facet arthropathy.  No canal or foraminal stenosis.  L4-L5: Disc bulge with central annular fissure.  Bilateral facet arthropathy.  No canal stenosis.  Mild bilateral foraminal stenosis.  L5-S1: Disc bulge with central extrusion and cranial migration.  Mild canal stenosis.  Moderate bilateral foraminal stenosis.  Mild edema in the posterior aspect of the L5 inferior endplate.    Labs:  BMP  Lab Results   Component Value Date     02/14/2022    K 5.1 02/14/2022     02/14/2022    CO2 27 02/14/2022    BUN 26 (H) 02/14/2022    CREATININE 1.1 02/14/2022    CALCIUM 10.3 02/14/2022    ANIONGAP 7 (L) 02/14/2022    ESTGFRAFRICA >60 02/14/2022    EGFRNONAA >60 02/14/2022     Lab Results   Component Value Date    ALT 47 (H) 06/29/2022    AST 43 (H) 06/29/2022    ALKPHOS 66 06/29/2022    BILITOT 1.0 06/29/2022       Assessment:   Problem List Items Addressed This Visit    None     Visit Diagnoses     Lumbar radiculopathy    -  Primary    Relevant Medications    HYDROcodone-acetaminophen (NORCO) 5-325 mg per tablet    Other Relevant Orders    Case Request Operating Room: Injection-steroid-epidural-lumbar L5/S1 (Completed)          41 y.o. year old male who presents with back pain.  He has had chronic lower back pain for the past few years however he reports that a couple days ago he was playing golf and experienced sharp,  sudden severe lower back pain.  Today he reports his pain is 8/10, aching, sharp with radiation into his bilateral buttocks and down the back of his his thighs.  He denies any numbness or weakness.  His pain is worse with standing and walking and relieved with sitting and lying down.    - on exam he has full strength and intact sensation bilateral L2-S1.  0 bilateral patellar and Achilles DTR  - I independently reviewed his lumbar MRI and is consistent with L4-5 disc protrusion with annular fissure and L5-S1 disc bulge with central extrusion and cranial migration with mild canal stenosis.  He also has some mild bilateral facet arthropathy at the lower levels of the lumbar spine  - over the past 8 weeks he has been maintaining PT directed home exercises at home  - I think that given the acuity of his pain in relation to the torquing and twisting motion of playing golf he likely exacerbated his lumbar disc protrusions/extrusions  - we discussed conservative management with a Medrol Dosepak versus lumbar JONNATHAN.  At this time he reports he would like to pursue lumbar JONNATHAN as he is having severe lower back pain that is limiting his mobility and interfering with his ADLs  - we will schedule for an L5-S1 JONNATHAN.  The risks and benefits of this intervention, and alternative therapies were discussed with the patient.  The discussion of risks included infection, bleeding, need for additional procedures or surgery, nerve damage.  Questions regarding the procedure, risks, expected outcome, and possible side effects were solicited and answered to the patient's satisfaction.  Jorge Bae wishes to proceed with the injection or procedure.  Written consent was obtained.  - follow-up 2 weeks post injection    : Not applicable    Reno Yu M.D.  Interventional Pain Medicine / Anesthesiology    This note was completed with dictation software and grammatical errors may exist.

## 2022-07-20 NOTE — TELEPHONE ENCOUNTER
Spoke to patient to reschedule procedure. [place] and would call me back if he decided to move forward to schedule here with Dr. Vargas.

## 2022-07-20 NOTE — H&P (VIEW-ONLY)
[facility] Follow Up Evaluation      CC:   Chief Complaint   Patient presents with    Back Pain      Last 3 PDI Scores 7/20/2022 10/28/2021   Pain Disability Index (PDI) 40 19       HPI:   Jorge Bae is a 41 y.o. male who presents with back pain.  He has had chronic lower back pain for the past few years however he reports that a couple days ago he was playing golf and experienced sharp, sudden severe lower back pain.  Today he reports his pain is 8/10, aching, sharp with radiation into his bilateral buttocks and down the back of his his thighs.  He denies any numbness or weakness.  His pain is worse with standing and walking and relieved with sitting and lying down.    History:    Current Outpatient Medications:     atomoxetine (STRATTERA) 25 MG capsule, Take 25 mg by mouth 2 (two) times daily., Disp: , Rfl:     baclofen (LIORESAL) 20 MG tablet, Take 20 mg by mouth 3 (three) times daily., Disp: , Rfl:     buPROPion (WELLBUTRIN XL) 300 MG 24 hr tablet, Take 300 mg by mouth once daily., Disp: , Rfl:     celecoxib (CELEBREX) 200 MG capsule, Take 200 mg by mouth daily as needed., Disp: , Rfl:     ondansetron (ZOFRAN-ODT) 4 MG TbDL, Take 4 mg by mouth every 6 (six) hours as needed., Disp: , Rfl:     testosterone cypionate (DEPOTESTOTERONE CYPIONATE) 200 mg/mL injection, Inject 150 mg into the muscle every 7 days., Disp: , Rfl:     tiZANidine (ZANAFLEX) 4 MG tablet, TAKE 1 TABLET BY MOUTH NIGHTLY AS NEEDED., Disp: 90 tablet, Rfl: 2    HYDROcodone-acetaminophen (NORCO) 5-325 mg per tablet, Take 1 tablet by mouth every 8 (eight) hours as needed for Pain., Disp: 15 tablet, Rfl: 0    Past Medical History:   Diagnosis Date    Depression     Hematuria     Testosterone deficiency        Past Surgical History:   Procedure Laterality Date    deviated septum repair      ESOPHAGOGASTRODUODENOSCOPY N/A 11/11/2020    Procedure: EGD (ESOPHAGOGASTRODUODENOSCOPY);  Surgeon: Yunior Stevens MD;  Location:  [facility] ENDO;  Service: Endoscopy;  Laterality: N/A;    INJECTION OF FACET JOINT Bilateral 11/3/2021    Procedure: INJECTION, FACET JOINT BILATERAL L4/5, L5/S1;  Surgeon: Mateus Vargas MD;  Location: [facility];  Service: Pain Management;  Laterality: Bilateral;       Family History   Problem Relation Age of Onset    Alzheimer's disease Mother     Early death Mother     No Known Problems Father     Heart disease Maternal Grandfather        Social History     Socioeconomic History    Marital status:    Tobacco Use    Smoking status: Never Smoker    Smokeless tobacco: Never Used   Substance and Sexual Activity    Alcohol use: Not Currently     Comment: stopped in 2021, 4-6 times per week, 1/2 wine bottle per night    Drug use: Not Currently     Types: Amphetamines    Sexual activity: Yes     Partners: Female     Birth control/protection: None     Comment: partner has Mirena   Social History Narrative    January 4    Zia 16 mos    Works as .     Social Determinants of Health     Financial Resource Strain: Low Risk     Difficulty of Paying Living Expenses: Not hard at all   Food Insecurity: No Food Insecurity    Worried About Running Out of Food in the Last Year: Never true    Ran Out of Food in the Last Year: Never true   Transportation Needs: No Transportation Needs    Lack of Transportation (Medical): No    Lack of Transportation (Non-Medical): No   Physical Activity: Sufficiently Active    Days of Exercise per Week: 7 days    Minutes of Exercise per Session: 60 min   Stress: Stress Concern Present    Feeling of Stress : To some extent   Social Connections: Unknown    Frequency of Communication with Friends and Family: Three times a week    Frequency of Social Gatherings with Friends and Family: Once a week    Active Member of Clubs or Organizations: No    Attends Club or Organization Meetings: More than 4 times per year    Marital Status:    Housing  "Stability: Low Risk     Unable to Pay for Housing in the Last Year: No    Number of Places Lived in the Last Year: 1    Unstable Housing in the Last Year: No       Review of patient's allergies indicates:  No Known Allergies    Review of Systems:  General ROS: negative for - fever  Psychological ROS: negative for - hostility  Hematological and Lymphatic ROS: negative for - bleeding problems  Endocrine ROS: negative for - unexpected weight changes  Respiratory ROS: no cough, shortness of breath, or wheezing  Cardiovascular ROS: no chest pain or dyspnea on exertion  Gastrointestinal ROS: no abdominal pain, change in bowel habits, or black or bloody stools  Musculoskeletal ROS: negative for - muscular weakness  Neurological ROS: negative for - numbness/tingling  Dermatological ROS: negative for rash    Physical Exam:  Vitals:    07/20/22 1157   BP: 138/76   Pulse: 70   Weight: 75 kg (165 lb 3.8 oz)   Height: 5' 9" (1.753 m)   PainSc:   8     Body mass index is 24.4 kg/m².    Gen: NAD  Gait: gait intact  Psych:  Mood appropriate for given condition  HEENT: eyes anicteric   GI: Abd soft  CV: RRR  Lungs: breathing unlabored   ROM: limited AROM of the L spine in all planes, full ROM at ankles, knees and hips  Sensation: intact to light touch in all dermatomes tested from L2-S1 bilaterally  Reflexes: 0 b/l patella and achilles  Tone: normal in the b/l knees and hips   Skin: intact  Extremities: No edema in b/l ankles or hands  Provacative tests:        Right Left   L2/3 Iliacus Hip flexion  5  5   L3/4 Qudratus Femoris Knee Extension  5  5   L4/5 Tib Anterior Ankle Dorsiflexion   5  5   L5/S1 Extensor Hallicus Longus Great toe extension  5  5                 S1/S2 Gastroc/Soleus Plantar Flexion  5  5       Imaging:  MRI lumbar spine 11/9/21  FINDINGS:  The lumbar spine demonstrates proper alignment. The vertebral bodies show normal signal intensity and height with no indication of acute fracture or pathologic marrow " replacement process.  Multilevel disc desiccation most prominent L5-S1.     The demonstrated portion of the spinal cord is normal in signal intensity at all levels with no indication of myelomalacia or cord edema. Conus terminates at L1.  Evaluation of the surrounding soft tissue structures demonstrates no acute abnormality.     Degenerative findings:     T12-L1: No significant spinal canal stenosis or neural foraminal narrowing.  L1-L2: No significant spinal canal stenosis or neural foraminal narrowing.  L2-L3: No significant spinal canal stenosis or neural foraminal narrowing.  L3-L4: Mild right facet arthropathy.  No canal or foraminal stenosis.  L4-L5: Disc bulge with central annular fissure.  Bilateral facet arthropathy.  No canal stenosis.  Mild bilateral foraminal stenosis.  L5-S1: Disc bulge with central extrusion and cranial migration.  Mild canal stenosis.  Moderate bilateral foraminal stenosis.  Mild edema in the posterior aspect of the L5 inferior endplate.    Labs:  BMP  Lab Results   Component Value Date     02/14/2022    K 5.1 02/14/2022     02/14/2022    CO2 27 02/14/2022    BUN 26 (H) 02/14/2022    CREATININE 1.1 02/14/2022    CALCIUM 10.3 02/14/2022    ANIONGAP 7 (L) 02/14/2022    ESTGFRAFRICA >60 02/14/2022    EGFRNONAA >60 02/14/2022     Lab Results   Component Value Date    ALT 47 (H) 06/29/2022    AST 43 (H) 06/29/2022    ALKPHOS 66 06/29/2022    BILITOT 1.0 06/29/2022       Assessment:   Problem List Items Addressed This Visit    None     Visit Diagnoses     Lumbar radiculopathy    -  Primary    Relevant Medications    HYDROcodone-acetaminophen (NORCO) 5-325 mg per tablet    Other Relevant Orders    Case Request Operating Room: Injection-steroid-epidural-lumbar L5/S1 (Completed)          41 y.o. year old male who presents with back pain.  He has had chronic lower back pain for the past few years however he reports that a couple days ago he was playing golf and experienced sharp,  sudden severe lower back pain.  Today he reports his pain is 8/10, aching, sharp with radiation into his bilateral buttocks and down the back of his his thighs.  He denies any numbness or weakness.  His pain is worse with standing and walking and relieved with sitting and lying down.    - on exam he has full strength and intact sensation bilateral L2-S1.  0 bilateral patellar and Achilles DTR  - I independently reviewed his lumbar MRI and is consistent with L4-5 disc protrusion with annular fissure and L5-S1 disc bulge with central extrusion and cranial migration with mild canal stenosis.  He also has some mild bilateral facet arthropathy at the lower levels of the lumbar spine  - over the past 8 weeks he has been maintaining PT directed home exercises at home  - I think that given the acuity of his pain in relation to the torquing and twisting motion of playing golf he likely exacerbated his lumbar disc protrusions/extrusions  - we discussed conservative management with a Medrol Dosepak versus lumbar JONNATHAN.  At this time he reports he would like to pursue lumbar JONNATHAN as he is having severe lower back pain that is limiting his mobility and interfering with his ADLs  - we will schedule for an L5-S1 JONNATHAN.  The risks and benefits of this intervention, and alternative therapies were discussed with the patient.  The discussion of risks included infection, bleeding, need for additional procedures or surgery, nerve damage.  Questions regarding the procedure, risks, expected outcome, and possible side effects were solicited and answered to the patient's satisfaction.  Jorge Bae wishes to proceed with the injection or procedure.  Written consent was obtained.  - follow-up 2 weeks post injection    : Not applicable    Reno Yu M.D.  Interventional Pain Medicine / Anesthesiology    This note was completed with dictation software and grammatical errors may exist.

## 2022-07-20 NOTE — TELEPHONE ENCOUNTER
Good afternoon,    On pre-op call patient stated he was told by Dr. Yu to start taking Celebrex, I told him we normally hold for 2-3 days prior to procedure. He has not taken in weeks, but wanted to clarify if should take it or hold. Also he would like Dr. Yu to not call in any pain meds for him. He said he does not need them. Please contact patient and advise. Thank you !    Myriam Cortez

## 2022-07-22 ENCOUNTER — HOSPITAL ENCOUNTER (OUTPATIENT)
Dept: RADIOLOGY | Facility: HOSPITAL | Age: 42
Discharge: HOME | End: 2022-07-22
Attending: ANESTHESIOLOGY
Payer: COMMERCIAL

## 2022-07-22 ENCOUNTER — HOSPITAL ENCOUNTER (OUTPATIENT)
Facility: HOSPITAL | Age: 42
Discharge: HOME | End: 2022-07-22
Attending: ANESTHESIOLOGY | Admitting: ANESTHESIOLOGY
Payer: COMMERCIAL

## 2022-07-22 VITALS
DIASTOLIC BLOOD PRESSURE: 72 MMHG | BODY MASS INDEX: 24.44 KG/M2 | TEMPERATURE: 98 F | WEIGHT: 165 LBS | HEIGHT: 69 IN | SYSTOLIC BLOOD PRESSURE: 121 MMHG | HEART RATE: 69 BPM | RESPIRATION RATE: 16 BRPM | OXYGEN SATURATION: 98 %

## 2022-07-22 DIAGNOSIS — M54.16 LUMBAR RADICULOPATHY: Primary | ICD-10-CM

## 2022-07-22 DIAGNOSIS — M54.50 LOWER BACK PAIN: ICD-10-CM

## 2022-07-22 PROCEDURE — 62323 NJX INTERLAMINAR LMBR/SAC: CPT | Mod: PO | Performed by: ANESTHESIOLOGY

## 2022-07-22 PROCEDURE — 62323 PR INJ LUMBAR/SACRAL, W/IMAGING GUIDANCE: ICD-10-PCS | Mod: ,,, | Performed by: ANESTHESIOLOGY

## 2022-07-22 PROCEDURE — 76000 FLUOROSCOPY <1 HR PHYS/QHP: CPT | Mod: TC,PO

## 2022-07-22 PROCEDURE — 63600175 PHARM REV CODE 636 W HCPCS: Mod: PO | Performed by: ANESTHESIOLOGY

## 2022-07-22 PROCEDURE — 62323 NJX INTERLAMINAR LMBR/SAC: CPT | Mod: ,,, | Performed by: ANESTHESIOLOGY

## 2022-07-22 PROCEDURE — 25500020 PHARM REV CODE 255: Mod: PO | Performed by: ANESTHESIOLOGY

## 2022-07-22 PROCEDURE — 25000003 PHARM REV CODE 250: Mod: PO | Performed by: ANESTHESIOLOGY

## 2022-07-22 RX ORDER — METHYLPREDNISOLONE ACETATE 80 MG/ML
INJECTION, SUSPENSION INTRA-ARTICULAR; INTRALESIONAL; INTRAMUSCULAR; SOFT TISSUE
Status: DISCONTINUED | OUTPATIENT
Start: 2022-07-22 | End: 2022-07-22 | Stop reason: HOSPADM

## 2022-07-22 RX ORDER — SODIUM CHLORIDE, SODIUM LACTATE, POTASSIUM CHLORIDE, CALCIUM CHLORIDE 600; 310; 30; 20 MG/100ML; MG/100ML; MG/100ML; MG/100ML
INJECTION, SOLUTION INTRAVENOUS CONTINUOUS
Status: DISCONTINUED | OUTPATIENT
Start: 2022-07-22 | End: 2022-07-22 | Stop reason: HOSPADM

## 2022-07-22 RX ORDER — LIDOCAINE HYDROCHLORIDE 10 MG/ML
INJECTION, SOLUTION EPIDURAL; INFILTRATION; INTRACAUDAL; PERINEURAL
Status: DISCONTINUED | OUTPATIENT
Start: 2022-07-22 | End: 2022-07-22 | Stop reason: HOSPADM

## 2022-07-22 RX ORDER — MIDAZOLAM HYDROCHLORIDE 2 MG/2ML
INJECTION, SOLUTION INTRAMUSCULAR; INTRAVENOUS
Status: DISCONTINUED | OUTPATIENT
Start: 2022-07-22 | End: 2022-07-22 | Stop reason: HOSPADM

## 2022-07-22 RX ADMIN — SODIUM CHLORIDE, SODIUM LACTATE, POTASSIUM CHLORIDE, AND CALCIUM CHLORIDE: .6; .31; .03; .02 INJECTION, SOLUTION INTRAVENOUS at 08:07

## 2022-07-22 NOTE — OP NOTE
"Procedure Note    Procedure Date: 7/22/2022    Procedure Performed:  L5/S1 lumbar interlaminar epidural steroid injection under fluoroscopy.    Indications: Patient has failed conservative therapy.      Pre-op diagnosis: Lumbar Radiculopathy    Post-op diagnosis: same    Physician: Reno Yu MD    IV anxiolysis medications: versed 2mg    Medications injected: depomedrol 80mg, 1% Lidocaine 1ml, 2 mL sterile, preservative-free normal saline.    Local anesthetic used: 1% Lidocaine, 1 ml, 8.4% sodium bicarbonate 0.25ml    Estimated Blood Loss: none    Complications:  none    Technique:  The patient was interviewed in the holding area and Risks/Benefits were discussed, including, but not limited to, the possibility of new or different pain, bleeding or infection.   All questions were answered.  The patient understood and accepted risks.  Consent was verfied.  A time-out was taken to identify patient and procedure prior to starting the procedure. The patient was placed in the prone position on the fluoroscopy table. The area of the lumbar spine was prepped with Chloraprep and draped in a sterile manner. The L5/S1 interspace was identified and marked under AP fluoroscopy. The skin and subcutaneous tissues overlying the targeted interspace were anesthetized with 3-5 mL of 1% lidocaine using a 25G, 1.5" needle.  A 20G, 3.5" Tuohy epidural needle was directed toward the interspace under fluoroscopic guidance until the ligamentum flavum was engaged. From this point, a loss of resistance technique with a glass syringe and saline was used to identify entrance of the needle into the epidural space. Once loss of resistance was observed 1 mL of contrast solution was injected. An appropriate epidurogram was noted.  A 4 mL mixture consisting of saline, 1 mL 1% Lidocaine and 80 mg of depomedrol was injected slowly and without resistance.  The needle was flushed with normal saline and removed. The contrast was seen to be displaced " after injection. Patient was awake/responsive during all injections.  The patient tolerated the procedure well and was transferred to the .AC.. in stable condition.  The patient was monitored after the procedure and was given post-procedure and discharge instructions to follow at home. The patient was discharged in a stable condition.

## 2022-07-22 NOTE — DISCHARGE SUMMARY
[facility]  Discharge Note  Short Stay    Admit Date: 7/22/2022    Discharge Date: 7/22/2022    Attending Physician: Reno Yu     Discharge Provider: Reno Yu    Diagnoses:  There are no hospital problems to display for this patient.      Discharged Condition: Good    Final Diagnoses: Lumbar radiculopathy [M54.16]    Disposition: Home or Self Care    Hospital Course: No complications, uneventful    Outcome of Hospitalization, Treatment, Procedure, or Surgery:  Patient was admitted for outpatient interventional pain management procedure. The patient tolerated the procedure well with no complications.    Follow up/Patient Instructions:  Follow up as scheduled in Pain Management office in 2-3 weeks.  Patient has received instructions and follow up date and time.    Medications:  Continue previous medications    Discharge Procedure Orders   Notify your health care provider if you experience any of the following:  temperature >100.4     Notify your health care provider if you experience any of the following:  persistent nausea and vomiting or diarrhea     Notify your health care provider if you experience any of the following:  severe uncontrolled pain     Notify your health care provider if you experience any of the following:  redness, tenderness, or signs of infection (pain, swelling, redness, odor or green/yellow discharge around incision site)     Notify your health care provider if you experience any of the following:  difficulty breathing or increased cough     Notify your health care provider if you experience any of the following:  severe persistent headache     Notify your health care provider if you experience any of the following:  worsening rash     Notify your health care provider if you experience any of the following:  persistent dizziness, light-headedness, or visual disturbances     Notify your health care provider if you experience any of the following:  increased confusion or  weakness     Activity as tolerated

## 2022-08-05 ENCOUNTER — OFFICE VISIT (OUTPATIENT)
Dept: PAIN MEDICINE | Facility: CLINIC | Age: 42
End: 2022-08-05
Payer: COMMERCIAL

## 2022-08-05 VITALS
WEIGHT: 164.69 LBS | OXYGEN SATURATION: 96 % | HEIGHT: 69 IN | SYSTOLIC BLOOD PRESSURE: 119 MMHG | DIASTOLIC BLOOD PRESSURE: 75 MMHG | HEART RATE: 79 BPM | BODY MASS INDEX: 24.39 KG/M2

## 2022-08-05 DIAGNOSIS — M54.16 LUMBAR RADICULOPATHY: Primary | ICD-10-CM

## 2022-08-05 PROCEDURE — 99213 OFFICE O/P EST LOW 20 MIN: CPT | Mod: S$GLB,,, | Performed by: ANESTHESIOLOGY

## 2022-08-05 PROCEDURE — 1159F MED LIST DOCD IN RCRD: CPT | Mod: CPTII,S$GLB,, | Performed by: ANESTHESIOLOGY

## 2022-08-05 PROCEDURE — 3074F SYST BP LT 130 MM HG: CPT | Mod: CPTII,S$GLB,, | Performed by: ANESTHESIOLOGY

## 2022-08-05 PROCEDURE — 3008F BODY MASS INDEX DOCD: CPT | Mod: CPTII,S$GLB,, | Performed by: ANESTHESIOLOGY

## 2022-08-05 PROCEDURE — 3008F PR BODY MASS INDEX (BMI) DOCUMENTED: ICD-10-PCS | Mod: CPTII,S$GLB,, | Performed by: ANESTHESIOLOGY

## 2022-08-05 PROCEDURE — 1160F RVW MEDS BY RX/DR IN RCRD: CPT | Mod: CPTII,S$GLB,, | Performed by: ANESTHESIOLOGY

## 2022-08-05 PROCEDURE — 99999 PR PBB SHADOW E&M-EST. PATIENT-LVL III: ICD-10-PCS | Mod: PBBFAC,,, | Performed by: ANESTHESIOLOGY

## 2022-08-05 PROCEDURE — 99213 PR OFFICE/OUTPT VISIT, EST, LEVL III, 20-29 MIN: ICD-10-PCS | Mod: S$GLB,,, | Performed by: ANESTHESIOLOGY

## 2022-08-05 PROCEDURE — 3078F DIAST BP <80 MM HG: CPT | Mod: CPTII,S$GLB,, | Performed by: ANESTHESIOLOGY

## 2022-08-05 PROCEDURE — 3074F PR MOST RECENT SYSTOLIC BLOOD PRESSURE < 130 MM HG: ICD-10-PCS | Mod: CPTII,S$GLB,, | Performed by: ANESTHESIOLOGY

## 2022-08-05 PROCEDURE — 1160F PR REVIEW ALL MEDS BY PRESCRIBER/CLIN PHARMACIST DOCUMENTED: ICD-10-PCS | Mod: CPTII,S$GLB,, | Performed by: ANESTHESIOLOGY

## 2022-08-05 PROCEDURE — 99999 PR PBB SHADOW E&M-EST. PATIENT-LVL III: CPT | Mod: PBBFAC,,, | Performed by: ANESTHESIOLOGY

## 2022-08-05 PROCEDURE — 3078F PR MOST RECENT DIASTOLIC BLOOD PRESSURE < 80 MM HG: ICD-10-PCS | Mod: CPTII,S$GLB,, | Performed by: ANESTHESIOLOGY

## 2022-08-05 PROCEDURE — 1159F PR MEDICATION LIST DOCUMENTED IN MEDICAL RECORD: ICD-10-PCS | Mod: CPTII,S$GLB,, | Performed by: ANESTHESIOLOGY

## 2022-08-05 NOTE — PROGRESS NOTES
[facility] Follow Up Evaluation      CC:   Chief Complaint   Patient presents with    Follow-up      Last 3 PDI Scores 7/20/2022 10/28/2021   Pain Disability Index (PDI) 40 19       Interval HPI 8/5/22: Mr. Bae returns to the office follow-up.  He is s/p L5-S1 on 07/22/2022 in reports 85-90% relief of his pain.  Improvement in his mobility in his quality of life.  Today denies any radicular pain, numbness, weakness.  Can get an occasional tweak in the lower back if he's doing rigorous activity.    HPI:   Jorge Bae is a 41 y.o. male who presents with back pain.  He has had chronic lower back pain for the past few years however he reports that a couple days ago he was playing golf and experienced sharp, sudden severe lower back pain.  Today he reports his pain is 8/10, aching, sharp with radiation into his bilateral buttocks and down the back of his his thighs.  He denies any numbness or weakness.  His pain is worse with standing and walking and relieved with sitting and lying down.    History:    Current Outpatient Medications:     atomoxetine (STRATTERA) 25 MG capsule, Take 25 mg by mouth 2 (two) times daily., Disp: , Rfl:     baclofen (LIORESAL) 20 MG tablet, Take 20 mg by mouth 3 (three) times daily., Disp: , Rfl:     buPROPion (WELLBUTRIN XL) 300 MG 24 hr tablet, Take 300 mg by mouth once daily., Disp: , Rfl:     celecoxib (CELEBREX) 200 MG capsule, Take 200 mg by mouth daily as needed., Disp: , Rfl:     ondansetron (ZOFRAN-ODT) 4 MG TbDL, Take 4 mg by mouth every 6 (six) hours as needed., Disp: , Rfl:     testosterone cypionate (DEPOTESTOTERONE CYPIONATE) 200 mg/mL injection, Inject 150 mg into the muscle every 7 days., Disp: , Rfl:     tiZANidine (ZANAFLEX) 4 MG tablet, TAKE 1 TABLET BY MOUTH NIGHTLY AS NEEDED., Disp: 90 tablet, Rfl: 2    Past Medical History:   Diagnosis Date    Depression     Hematuria     Testosterone deficiency        Past Surgical History:   Procedure  Laterality Date    deviated septum repair      EPIDURAL STEROID INJECTION INTO LUMBAR SPINE N/A 7/22/2022    Procedure: Injection-steroid-epidural-lumbar L5/S1;  Surgeon: Reno Yu MD;  Location: [facility] OR;  Service: Pain Management;  Laterality: N/A;    ESOPHAGOGASTRODUODENOSCOPY N/A 11/11/2020    Procedure: EGD (ESOPHAGOGASTRODUODENOSCOPY);  Surgeon: Yunior Stevens MD;  Location: [facility] ENDO;  Service: Endoscopy;  Laterality: N/A;    INJECTION OF FACET JOINT Bilateral 11/3/2021    Procedure: INJECTION, FACET JOINT BILATERAL L4/5, L5/S1;  Surgeon: Mateus Vargas MD;  Location: [facility] MGT;  Service: Pain Management;  Laterality: Bilateral;       Family History   Problem Relation Age of Onset    Alzheimer's disease Mother     Early death Mother     No Known Problems Father     Heart disease Maternal Grandfather        Social History     Socioeconomic History    Marital status:    Tobacco Use    Smoking status: Never Smoker    Smokeless tobacco: Never Used   Substance and Sexual Activity    Alcohol use: Not Currently     Comment: stopped in 2021, 4-6 times per week, 1/2 wine bottle per night    Drug use: Not Currently     Types: Amphetamines    Sexual activity: Yes     Partners: Female     Birth control/protection: None     Comment: partner has Mirena   Social History Narrative    January 4    Zia 16 mos    Works as .     Social Determinants of Health     Financial Resource Strain: Low Risk     Difficulty of Paying Living Expenses: Not hard at all   Food Insecurity: No Food Insecurity    Worried About Running Out of Food in the Last Year: Never true    Ran Out of Food in the Last Year: Never true   Transportation Needs: No Transportation Needs    Lack of Transportation (Medical): No    Lack of Transportation (Non-Medical): No   Physical Activity: Sufficiently Active    Days of Exercise per Week: 7 days    Minutes of Exercise per Session: 60 min   Stress: Stress  "Concern Present    Feeling of Stress : To some extent   Social Connections: Unknown    Frequency of Communication with Friends and Family: Three times a week    Frequency of Social Gatherings with Friends and Family: Once a week    Active Member of Clubs or Organizations: No    Attends Club or Organization Meetings: More than 4 times per year    Marital Status:    Housing Stability: Low Risk     Unable to Pay for Housing in the Last Year: No    Number of Places Lived in the Last Year: 1    Unstable Housing in the Last Year: No       Review of patient's allergies indicates:  No Known Allergies    Review of Systems:  General ROS: negative for - fever  Psychological ROS: negative for - hostility  Hematological and Lymphatic ROS: negative for - bleeding problems  Endocrine ROS: negative for - unexpected weight changes  Respiratory ROS: no cough, shortness of breath, or wheezing  Cardiovascular ROS: no chest pain or dyspnea on exertion  Gastrointestinal ROS: no abdominal pain, change in bowel habits, or black or bloody stools  Musculoskeletal ROS: negative for - muscular weakness  Neurological ROS: negative for - numbness/tingling  Dermatological ROS: negative for rash    Physical Exam:  Vitals:    08/05/22 0956   BP: 119/75   Pulse: 79   SpO2: 96%   Weight: 74.7 kg (164 lb 10.9 oz)   Height: 5' 9" (1.753 m)   PainSc: 0-No pain     Body mass index is 24.32 kg/m².    Gen: NAD  Gait: gait intact  Psych:  Mood appropriate for given condition  HEENT: eyes anicteric   GI: Abd soft  CV: RRR  Lungs: breathing unlabored   ROM: limited AROM of the L spine in all planes, full ROM at ankles, knees and hips  Sensation: intact to light touch in all dermatomes tested from L2-S1 bilaterally  Reflexes: 0 b/l patella and achilles  Tone: normal in the b/l knees and hips   Skin: intact  Extremities: No edema in b/l ankles or hands  Provacative tests:        Right Left   L2/3 Iliacus Hip flexion  5  5   L3/4 Qudratus Femoris " Knee Extension  5  5   L4/5 Tib Anterior Ankle Dorsiflexion   5  5   L5/S1 Extensor Hallicus Longus Great toe extension  5  5                 S1/S2 Gastroc/Soleus Plantar Flexion  5  5       Imaging:  MRI lumbar spine 11/9/21  FINDINGS:  The lumbar spine demonstrates proper alignment. The vertebral bodies show normal signal intensity and height with no indication of acute fracture or pathologic marrow replacement process.  Multilevel disc desiccation most prominent L5-S1.     The demonstrated portion of the spinal cord is normal in signal intensity at all levels with no indication of myelomalacia or cord edema. Conus terminates at L1.  Evaluation of the surrounding soft tissue structures demonstrates no acute abnormality.     Degenerative findings:     T12-L1: No significant spinal canal stenosis or neural foraminal narrowing.  L1-L2: No significant spinal canal stenosis or neural foraminal narrowing.  L2-L3: No significant spinal canal stenosis or neural foraminal narrowing.  L3-L4: Mild right facet arthropathy.  No canal or foraminal stenosis.  L4-L5: Disc bulge with central annular fissure.  Bilateral facet arthropathy.  No canal stenosis.  Mild bilateral foraminal stenosis.  L5-S1: Disc bulge with central extrusion and cranial migration.  Mild canal stenosis.  Moderate bilateral foraminal stenosis.  Mild edema in the posterior aspect of the L5 inferior endplate.    Labs:  BMP  Lab Results   Component Value Date     02/14/2022    K 5.1 02/14/2022     02/14/2022    CO2 27 02/14/2022    BUN 26 (H) 02/14/2022    CREATININE 1.1 02/14/2022    CALCIUM 10.3 02/14/2022    ANIONGAP 7 (L) 02/14/2022    ESTGFRAFRICA >60 02/14/2022    EGFRNONAA >60 02/14/2022     Lab Results   Component Value Date    ALT 47 (H) 06/29/2022    AST 43 (H) 06/29/2022    ALKPHOS 66 06/29/2022    BILITOT 1.0 06/29/2022       Assessment:   Problem List Items Addressed This Visit    None     Visit Diagnoses     Lumbar radiculopathy    -   Primary          41 y.o. year old male who presents with back pain.     8/5/22 - Mr. Bae returns to the office follow-up.  He is s/p L5-S1 on 07/22/2022 in reports 85-90% relief of his pain.  Improvement in his mobility in his quality of life.  Today denies any radicular pain, numbness, weakness.  Can get an occasional tweak in the lower back if he's doing rigorous activity.    - I independently reviewed his lumbar MRI and is consistent with L4-5 disc protrusion with annular fissure and L5-S1 disc bulge with central extrusion and cranial migration with mild canal stenosis.  He also has some mild bilateral facet arthropathy at the lower levels of the lumbar spine  - overall he is doing much better following L5-S1 JONNATHAN.  At this time I recommend that he maintain conservative treatment measures.  He has completed a full course of the healthy back program and maintains these home exercises  - he will follow-up as needed    : Not applicable    Reno Yu M.D.  Interventional Pain Medicine / Anesthesiology    This note was completed with dictation software and grammatical errors may exist.

## 2022-08-12 ENCOUNTER — OFFICE VISIT (OUTPATIENT)
Dept: PAIN MEDICINE | Facility: CLINIC | Age: 42
End: 2022-08-12
Payer: COMMERCIAL

## 2022-08-12 VITALS
WEIGHT: 165.13 LBS | DIASTOLIC BLOOD PRESSURE: 80 MMHG | BODY MASS INDEX: 24.46 KG/M2 | OXYGEN SATURATION: 96 % | SYSTOLIC BLOOD PRESSURE: 135 MMHG | HEIGHT: 69 IN | HEART RATE: 82 BPM

## 2022-08-12 DIAGNOSIS — M54.16 LUMBAR RADICULOPATHY: Primary | ICD-10-CM

## 2022-08-12 DIAGNOSIS — M54.9 DORSALGIA, UNSPECIFIED: ICD-10-CM

## 2022-08-12 PROCEDURE — 99214 PR OFFICE/OUTPT VISIT, EST, LEVL IV, 30-39 MIN: ICD-10-PCS | Mod: S$GLB,,, | Performed by: ANESTHESIOLOGY

## 2022-08-12 PROCEDURE — 1160F RVW MEDS BY RX/DR IN RCRD: CPT | Mod: CPTII,S$GLB,, | Performed by: ANESTHESIOLOGY

## 2022-08-12 PROCEDURE — 3075F PR MOST RECENT SYSTOLIC BLOOD PRESS GE 130-139MM HG: ICD-10-PCS | Mod: CPTII,S$GLB,, | Performed by: ANESTHESIOLOGY

## 2022-08-12 PROCEDURE — 99214 OFFICE O/P EST MOD 30 MIN: CPT | Mod: S$GLB,,, | Performed by: ANESTHESIOLOGY

## 2022-08-12 PROCEDURE — 99999 PR PBB SHADOW E&M-EST. PATIENT-LVL IV: CPT | Mod: PBBFAC,,, | Performed by: ANESTHESIOLOGY

## 2022-08-12 PROCEDURE — 1160F PR REVIEW ALL MEDS BY PRESCRIBER/CLIN PHARMACIST DOCUMENTED: ICD-10-PCS | Mod: CPTII,S$GLB,, | Performed by: ANESTHESIOLOGY

## 2022-08-12 PROCEDURE — 1159F MED LIST DOCD IN RCRD: CPT | Mod: CPTII,S$GLB,, | Performed by: ANESTHESIOLOGY

## 2022-08-12 PROCEDURE — 1159F PR MEDICATION LIST DOCUMENTED IN MEDICAL RECORD: ICD-10-PCS | Mod: CPTII,S$GLB,, | Performed by: ANESTHESIOLOGY

## 2022-08-12 PROCEDURE — 3079F DIAST BP 80-89 MM HG: CPT | Mod: CPTII,S$GLB,, | Performed by: ANESTHESIOLOGY

## 2022-08-12 PROCEDURE — 3008F PR BODY MASS INDEX (BMI) DOCUMENTED: ICD-10-PCS | Mod: CPTII,S$GLB,, | Performed by: ANESTHESIOLOGY

## 2022-08-12 PROCEDURE — 3075F SYST BP GE 130 - 139MM HG: CPT | Mod: CPTII,S$GLB,, | Performed by: ANESTHESIOLOGY

## 2022-08-12 PROCEDURE — 99999 PR PBB SHADOW E&M-EST. PATIENT-LVL IV: ICD-10-PCS | Mod: PBBFAC,,, | Performed by: ANESTHESIOLOGY

## 2022-08-12 PROCEDURE — 3079F PR MOST RECENT DIASTOLIC BLOOD PRESSURE 80-89 MM HG: ICD-10-PCS | Mod: CPTII,S$GLB,, | Performed by: ANESTHESIOLOGY

## 2022-08-12 PROCEDURE — 3008F BODY MASS INDEX DOCD: CPT | Mod: CPTII,S$GLB,, | Performed by: ANESTHESIOLOGY

## 2022-08-12 RX ORDER — TRAMADOL HYDROCHLORIDE 50 MG/1
50 TABLET ORAL EVERY 8 HOURS PRN
Qty: 21 TABLET | Refills: 0 | Status: SHIPPED | OUTPATIENT
Start: 2022-08-12 | End: 2022-10-10 | Stop reason: SDUPTHER

## 2022-08-12 NOTE — H&P (VIEW-ONLY)
[facility] Follow Up Evaluation      CC:   Chief Complaint   Patient presents with    Back Pain      Last 3 PDI Scores 7/20/2022 10/28/2021   Pain Disability Index (PDI) 40 19       Interval HPI 8/12/22: Mr. Bae returns to the office follow-up.  I last saw him on 8/5/22 and at the time he had excellent relief of his back pain.  Since I last saw him a week ago he reports he has been having worsening lower back pain again.  He is not sure of any specific inciting event, however he feels like his back pain noticeably worsened this past week after he was playing tennis with his kids and then some golf.  Today he reports severe lower back pain in the midline that is constant, sharp, burning, aching.  His pain is worse with pretty much all movements such as back flexion and any lateral rotation.    Pain intervention history:   - s/p L5-S1 on 07/22/2022 and reports 85-90% relief   - s/p bilateral L4/5 and L5/S1 facet joint injections on 11/3/21    HPI:   Jorge Bae III is a 41 y.o. male who presents with back pain.  He has had chronic lower back pain for the past few years however he reports that a couple days ago he was playing golf and experienced sharp, sudden severe lower back pain.  Today he reports his pain is 8/10, aching, sharp with radiation into his bilateral buttocks and down the back of his his thighs.  He denies any numbness or weakness.  His pain is worse with standing and walking and relieved with sitting and lying down.    History:    Current Outpatient Medications:     atomoxetine (STRATTERA) 25 MG capsule, Take 25 mg by mouth 2 (two) times daily., Disp: , Rfl:     baclofen (LIORESAL) 20 MG tablet, Take 20 mg by mouth 3 (three) times daily., Disp: , Rfl:     buPROPion (WELLBUTRIN XL) 300 MG 24 hr tablet, Take 300 mg by mouth once daily., Disp: , Rfl:     celecoxib (CELEBREX) 200 MG capsule, Take 200 mg by mouth daily as needed., Disp: , Rfl:     ondansetron (ZOFRAN-ODT) 4 MG  TbDL, Take 4 mg by mouth every 6 (six) hours as needed., Disp: , Rfl:     testosterone cypionate (DEPOTESTOTERONE CYPIONATE) 200 mg/mL injection, Inject 150 mg into the muscle every 7 days., Disp: , Rfl:     tiZANidine (ZANAFLEX) 4 MG tablet, TAKE 1 TABLET BY MOUTH NIGHTLY AS NEEDED., Disp: 90 tablet, Rfl: 2    traMADoL (ULTRAM) 50 mg tablet, Take 1 tablet (50 mg total) by mouth every 8 (eight) hours as needed for Pain., Disp: 21 tablet, Rfl: 0    Past Medical History:   Diagnosis Date    Depression     Hematuria     Testosterone deficiency        Past Surgical History:   Procedure Laterality Date    deviated septum repair      EPIDURAL STEROID INJECTION INTO LUMBAR SPINE N/A 7/22/2022    Procedure: Injection-steroid-epidural-lumbar L5/S1;  Surgeon: Reno Yu MD;  Location: [facility] OR;  Service: Pain Management;  Laterality: N/A;    ESOPHAGOGASTRODUODENOSCOPY N/A 11/11/2020    Procedure: EGD (ESOPHAGOGASTRODUODENOSCOPY);  Surgeon: Yunior Stevens MD;  Location: [facility];  Service: Endoscopy;  Laterality: N/A;    INJECTION OF FACET JOINT Bilateral 11/3/2021    Procedure: INJECTION, FACET JOINT BILATERAL L4/5, L5/S1;  Surgeon: Mateus Vargas MD;  Location: [facility];  Service: Pain Management;  Laterality: Bilateral;       Family History   Problem Relation Age of Onset    Alzheimer's disease Mother     Early death Mother     No Known Problems Father     Heart disease Maternal Grandfather        Social History     Socioeconomic History    Marital status:    Tobacco Use    Smoking status: Never Smoker    Smokeless tobacco: Never Used   Substance and Sexual Activity    Alcohol use: Not Currently     Comment: stopped in 2021, 4-6 times per week, 1/2 wine bottle per night    Drug use: Not Currently     Types: Amphetamines    Sexual activity: Yes     Partners: Female     Birth control/protection: None     Comment: partner has Mirena   Social History Narrative    January 4    Jack 16 mos  "   Works as .     Social Determinants of Health     Financial Resource Strain: Low Risk     Difficulty of Paying Living Expenses: Not hard at all   Food Insecurity: No Food Insecurity    Worried About Running Out of Food in the Last Year: Never true    Ran Out of Food in the Last Year: Never true   Transportation Needs: No Transportation Needs    Lack of Transportation (Medical): No    Lack of Transportation (Non-Medical): No   Physical Activity: Sufficiently Active    Days of Exercise per Week: 7 days    Minutes of Exercise per Session: 60 min   Stress: Stress Concern Present    Feeling of Stress : To some extent   Social Connections: Unknown    Frequency of Communication with Friends and Family: Three times a week    Frequency of Social Gatherings with Friends and Family: Once a week    Active Member of Clubs or Organizations: No    Attends Club or Organization Meetings: More than 4 times per year    Marital Status:    Housing Stability: Low Risk     Unable to Pay for Housing in the Last Year: No    Number of Places Lived in the Last Year: 1    Unstable Housing in the Last Year: No       Review of patient's allergies indicates:  No Known Allergies    Review of Systems:  General ROS: negative for - fever  Psychological ROS: negative for - hostility  Hematological and Lymphatic ROS: negative for - bleeding problems  Endocrine ROS: negative for - unexpected weight changes  Respiratory ROS: no cough, shortness of breath, or wheezing  Cardiovascular ROS: no chest pain or dyspnea on exertion  Gastrointestinal ROS: no abdominal pain, change in bowel habits, or black or bloody stools  Musculoskeletal ROS: negative for - muscular weakness  Neurological ROS: negative for - numbness/tingling  Dermatological ROS: negative for rash    Physical Exam:  Vitals:    08/12/22 0858   BP: 135/80   Pulse: 82   SpO2: 96%   Weight: 74.9 kg (165 lb 2 oz)   Height: 5' 9" (1.753 m)   PainSc:   4 "   PainLoc: Back     Body mass index is 24.38 kg/m².    Gen: NAD  Gait: gait intact  Psych:  Mood appropriate for given condition  HEENT: eyes anicteric   GI: Abd soft  CV: RRR  Lungs: breathing unlabored   ROM: limited AROM of the L spine in all planes, full ROM at ankles, knees and hips  Sensation: intact to light touch in all dermatomes tested from L2-S1 bilaterally  Reflexes: 0 b/l patella and achilles  Tone: normal in the b/l knees and hips   Skin: intact  Extremities: No edema in b/l ankles or hands  Provacative tests:        Right Left   L2/3 Iliacus Hip flexion  5  5   L3/4 Qudratus Femoris Knee Extension  5  5   L4/5 Tib Anterior Ankle Dorsiflexion   5  5   L5/S1 Extensor Hallicus Longus Great toe extension  5  2+ to 3-                 S1/S2 Gastroc/Soleus Plantar Flexion  5  5       Imaging:  MRI lumbar spine 11/9/21  FINDINGS:  The lumbar spine demonstrates proper alignment. The vertebral bodies show normal signal intensity and height with no indication of acute fracture or pathologic marrow replacement process.  Multilevel disc desiccation most prominent L5-S1.     The demonstrated portion of the spinal cord is normal in signal intensity at all levels with no indication of myelomalacia or cord edema. Conus terminates at L1.  Evaluation of the surrounding soft tissue structures demonstrates no acute abnormality.     Degenerative findings:     T12-L1: No significant spinal canal stenosis or neural foraminal narrowing.  L1-L2: No significant spinal canal stenosis or neural foraminal narrowing.  L2-L3: No significant spinal canal stenosis or neural foraminal narrowing.  L3-L4: Mild right facet arthropathy.  No canal or foraminal stenosis.  L4-L5: Disc bulge with central annular fissure.  Bilateral facet arthropathy.  No canal stenosis.  Mild bilateral foraminal stenosis.  L5-S1: Disc bulge with central extrusion and cranial migration.  Mild canal stenosis.  Moderate bilateral foraminal stenosis.  Mild edema  in the posterior aspect of the L5 inferior endplate.    Labs:  BMP  Lab Results   Component Value Date     02/14/2022    K 5.1 02/14/2022     02/14/2022    CO2 27 02/14/2022    BUN 26 (H) 02/14/2022    CREATININE 1.1 02/14/2022    CALCIUM 10.3 02/14/2022    ANIONGAP 7 (L) 02/14/2022    ESTGFRAFRICA >60 02/14/2022    EGFRNONAA >60 02/14/2022     Lab Results   Component Value Date    ALT 47 (H) 06/29/2022    AST 43 (H) 06/29/2022    ALKPHOS 66 06/29/2022    BILITOT 1.0 06/29/2022       Assessment:   Problem List Items Addressed This Visit    None     Visit Diagnoses     Lumbar radiculopathy    -  Primary    Relevant Orders    MRI Lumbar Spine Without Contrast    Ambulatory referral/consult to Neurosurgery    Dorsalgia, unspecified        Relevant Orders    MRI Lumbar Spine Without Contrast          41 y.o. year old male who presents with back pain.     8/12/22 - Mr. Bae returns to the office follow-up.  I last saw him on 8/5/22 and at the time he had excellent relief of his back pain.  Since I last saw him a week ago he reports he has been having worsening lower back pain again.  He is not sure of any specific inciting event, however he feels like his back pain noticeably worsened this past week after he was playing tennis with his kids and then some golf.  Today he reports severe lower back pain in the midline that is constant, sharp, burning, aching.  His pain is worse with pretty much all movements such as back flexion and any lateral rotation.    - on exam today he has 2+ to 3-/5 left EHL.  Intact sensation to light touch bilateral L2-S1  - I independently reviewed his lumbar MRI from 11/2021 he has an L4-5 disc protrusion with annular fissure and an L5-S1 disc bulge with central extrusion and cranial migration with mild canal stenosis.  He also has some mild bilateral facet arthropathy at the lower levels of the lumbar spine  - his EHL weakness is new and I'm concerned that he has a worsening  lumbar disc extrusion at L5-S1 level causing this weakness  - I have ordered an updated lumbar MRI to evaluate for interval changes in his neural anatomy   - I have also placed a referral for him to see one of our neurosurgeons for worsening lumbar radiculopathy  - I prescribed some tramadol to use as needed for severe pain    : Not applicable    Reno Yu M.D.  Interventional Pain Medicine / Anesthesiology    This note was completed with dictation software and grammatical errors may exist.

## 2022-08-12 NOTE — PROGRESS NOTES
[facility] Follow Up Evaluation      CC:   Chief Complaint   Patient presents with    Back Pain      Last 3 PDI Scores 7/20/2022 10/28/2021   Pain Disability Index (PDI) 40 19       Interval HPI 8/12/22: Mr. Bae returns to the office follow-up.  I last saw him on 8/5/22 and at the time he had excellent relief of his back pain.  Since I last saw him a week ago he reports he has been having worsening lower back pain again.  He is not sure of any specific inciting event, however he feels like his back pain noticeably worsened this past week after he was playing tennis with his kids and then some golf.  Today he reports severe lower back pain in the midline that is constant, sharp, burning, aching.  His pain is worse with pretty much all movements such as back flexion and any lateral rotation.    Pain intervention history:   - s/p L5-S1 on 07/22/2022 and reports 85-90% relief   - s/p bilateral L4/5 and L5/S1 facet joint injections on 11/3/21    HPI:   Jorge Bae III is a 41 y.o. male who presents with back pain.  He has had chronic lower back pain for the past few years however he reports that a couple days ago he was playing golf and experienced sharp, sudden severe lower back pain.  Today he reports his pain is 8/10, aching, sharp with radiation into his bilateral buttocks and down the back of his his thighs.  He denies any numbness or weakness.  His pain is worse with standing and walking and relieved with sitting and lying down.    History:    Current Outpatient Medications:     atomoxetine (STRATTERA) 25 MG capsule, Take 25 mg by mouth 2 (two) times daily., Disp: , Rfl:     baclofen (LIORESAL) 20 MG tablet, Take 20 mg by mouth 3 (three) times daily., Disp: , Rfl:     buPROPion (WELLBUTRIN XL) 300 MG 24 hr tablet, Take 300 mg by mouth once daily., Disp: , Rfl:     celecoxib (CELEBREX) 200 MG capsule, Take 200 mg by mouth daily as needed., Disp: , Rfl:     ondansetron (ZOFRAN-ODT) 4 MG  TbDL, Take 4 mg by mouth every 6 (six) hours as needed., Disp: , Rfl:     testosterone cypionate (DEPOTESTOTERONE CYPIONATE) 200 mg/mL injection, Inject 150 mg into the muscle every 7 days., Disp: , Rfl:     tiZANidine (ZANAFLEX) 4 MG tablet, TAKE 1 TABLET BY MOUTH NIGHTLY AS NEEDED., Disp: 90 tablet, Rfl: 2    traMADoL (ULTRAM) 50 mg tablet, Take 1 tablet (50 mg total) by mouth every 8 (eight) hours as needed for Pain., Disp: 21 tablet, Rfl: 0    Past Medical History:   Diagnosis Date    Depression     Hematuria     Testosterone deficiency        Past Surgical History:   Procedure Laterality Date    deviated septum repair      EPIDURAL STEROID INJECTION INTO LUMBAR SPINE N/A 7/22/2022    Procedure: Injection-steroid-epidural-lumbar L5/S1;  Surgeon: Reno Yu MD;  Location: [facility] OR;  Service: Pain Management;  Laterality: N/A;    ESOPHAGOGASTRODUODENOSCOPY N/A 11/11/2020    Procedure: EGD (ESOPHAGOGASTRODUODENOSCOPY);  Surgeon: Yunior Stevens MD;  Location: [facility];  Service: Endoscopy;  Laterality: N/A;    INJECTION OF FACET JOINT Bilateral 11/3/2021    Procedure: INJECTION, FACET JOINT BILATERAL L4/5, L5/S1;  Surgeon: Mateus Vargas MD;  Location: [facility];  Service: Pain Management;  Laterality: Bilateral;       Family History   Problem Relation Age of Onset    Alzheimer's disease Mother     Early death Mother     No Known Problems Father     Heart disease Maternal Grandfather        Social History     Socioeconomic History    Marital status:    Tobacco Use    Smoking status: Never Smoker    Smokeless tobacco: Never Used   Substance and Sexual Activity    Alcohol use: Not Currently     Comment: stopped in 2021, 4-6 times per week, 1/2 wine bottle per night    Drug use: Not Currently     Types: Amphetamines    Sexual activity: Yes     Partners: Female     Birth control/protection: None     Comment: partner has Mirena   Social History Narrative    January 4    Jack 16 mos  "   Works as .     Social Determinants of Health     Financial Resource Strain: Low Risk     Difficulty of Paying Living Expenses: Not hard at all   Food Insecurity: No Food Insecurity    Worried About Running Out of Food in the Last Year: Never true    Ran Out of Food in the Last Year: Never true   Transportation Needs: No Transportation Needs    Lack of Transportation (Medical): No    Lack of Transportation (Non-Medical): No   Physical Activity: Sufficiently Active    Days of Exercise per Week: 7 days    Minutes of Exercise per Session: 60 min   Stress: Stress Concern Present    Feeling of Stress : To some extent   Social Connections: Unknown    Frequency of Communication with Friends and Family: Three times a week    Frequency of Social Gatherings with Friends and Family: Once a week    Active Member of Clubs or Organizations: No    Attends Club or Organization Meetings: More than 4 times per year    Marital Status:    Housing Stability: Low Risk     Unable to Pay for Housing in the Last Year: No    Number of Places Lived in the Last Year: 1    Unstable Housing in the Last Year: No       Review of patient's allergies indicates:  No Known Allergies    Review of Systems:  General ROS: negative for - fever  Psychological ROS: negative for - hostility  Hematological and Lymphatic ROS: negative for - bleeding problems  Endocrine ROS: negative for - unexpected weight changes  Respiratory ROS: no cough, shortness of breath, or wheezing  Cardiovascular ROS: no chest pain or dyspnea on exertion  Gastrointestinal ROS: no abdominal pain, change in bowel habits, or black or bloody stools  Musculoskeletal ROS: negative for - muscular weakness  Neurological ROS: negative for - numbness/tingling  Dermatological ROS: negative for rash    Physical Exam:  Vitals:    08/12/22 0858   BP: 135/80   Pulse: 82   SpO2: 96%   Weight: 74.9 kg (165 lb 2 oz)   Height: 5' 9" (1.753 m)   PainSc:   4 "   PainLoc: Back     Body mass index is 24.38 kg/m².    Gen: NAD  Gait: gait intact  Psych:  Mood appropriate for given condition  HEENT: eyes anicteric   GI: Abd soft  CV: RRR  Lungs: breathing unlabored   ROM: limited AROM of the L spine in all planes, full ROM at ankles, knees and hips  Sensation: intact to light touch in all dermatomes tested from L2-S1 bilaterally  Reflexes: 0 b/l patella and achilles  Tone: normal in the b/l knees and hips   Skin: intact  Extremities: No edema in b/l ankles or hands  Provacative tests:        Right Left   L2/3 Iliacus Hip flexion  5  5   L3/4 Qudratus Femoris Knee Extension  5  5   L4/5 Tib Anterior Ankle Dorsiflexion   5  5   L5/S1 Extensor Hallicus Longus Great toe extension  5  2+ to 3-                 S1/S2 Gastroc/Soleus Plantar Flexion  5  5       Imaging:  MRI lumbar spine 11/9/21  FINDINGS:  The lumbar spine demonstrates proper alignment. The vertebral bodies show normal signal intensity and height with no indication of acute fracture or pathologic marrow replacement process.  Multilevel disc desiccation most prominent L5-S1.     The demonstrated portion of the spinal cord is normal in signal intensity at all levels with no indication of myelomalacia or cord edema. Conus terminates at L1.  Evaluation of the surrounding soft tissue structures demonstrates no acute abnormality.     Degenerative findings:     T12-L1: No significant spinal canal stenosis or neural foraminal narrowing.  L1-L2: No significant spinal canal stenosis or neural foraminal narrowing.  L2-L3: No significant spinal canal stenosis or neural foraminal narrowing.  L3-L4: Mild right facet arthropathy.  No canal or foraminal stenosis.  L4-L5: Disc bulge with central annular fissure.  Bilateral facet arthropathy.  No canal stenosis.  Mild bilateral foraminal stenosis.  L5-S1: Disc bulge with central extrusion and cranial migration.  Mild canal stenosis.  Moderate bilateral foraminal stenosis.  Mild edema  in the posterior aspect of the L5 inferior endplate.    Labs:  BMP  Lab Results   Component Value Date     02/14/2022    K 5.1 02/14/2022     02/14/2022    CO2 27 02/14/2022    BUN 26 (H) 02/14/2022    CREATININE 1.1 02/14/2022    CALCIUM 10.3 02/14/2022    ANIONGAP 7 (L) 02/14/2022    ESTGFRAFRICA >60 02/14/2022    EGFRNONAA >60 02/14/2022     Lab Results   Component Value Date    ALT 47 (H) 06/29/2022    AST 43 (H) 06/29/2022    ALKPHOS 66 06/29/2022    BILITOT 1.0 06/29/2022       Assessment:   Problem List Items Addressed This Visit    None     Visit Diagnoses     Lumbar radiculopathy    -  Primary    Relevant Orders    MRI Lumbar Spine Without Contrast    Ambulatory referral/consult to Neurosurgery    Dorsalgia, unspecified        Relevant Orders    MRI Lumbar Spine Without Contrast          41 y.o. year old male who presents with back pain.     8/12/22 - Mr. Bae returns to the office follow-up.  I last saw him on 8/5/22 and at the time he had excellent relief of his back pain.  Since I last saw him a week ago he reports he has been having worsening lower back pain again.  He is not sure of any specific inciting event, however he feels like his back pain noticeably worsened this past week after he was playing tennis with his kids and then some golf.  Today he reports severe lower back pain in the midline that is constant, sharp, burning, aching.  His pain is worse with pretty much all movements such as back flexion and any lateral rotation.    - on exam today he has 2+ to 3-/5 left EHL.  Intact sensation to light touch bilateral L2-S1  - I independently reviewed his lumbar MRI from 11/2021 he has an L4-5 disc protrusion with annular fissure and an L5-S1 disc bulge with central extrusion and cranial migration with mild canal stenosis.  He also has some mild bilateral facet arthropathy at the lower levels of the lumbar spine  - his EHL weakness is new and I'm concerned that he has a worsening  lumbar disc extrusion at L5-S1 level causing this weakness  - I have ordered an updated lumbar MRI to evaluate for interval changes in his neural anatomy   - I have also placed a referral for him to see one of our neurosurgeons for worsening lumbar radiculopathy  - I prescribed some tramadol to use as needed for severe pain    : Not applicable    Reno Yu M.D.  Interventional Pain Medicine / Anesthesiology    This note was completed with dictation software and grammatical errors may exist.

## 2022-08-13 ENCOUNTER — HOSPITAL ENCOUNTER (OUTPATIENT)
Dept: RADIOLOGY | Facility: HOSPITAL | Age: 42
Discharge: HOME | End: 2022-08-13
Attending: ANESTHESIOLOGY
Payer: COMMERCIAL

## 2022-08-13 DIAGNOSIS — M54.9 DORSALGIA, UNSPECIFIED: ICD-10-CM

## 2022-08-13 DIAGNOSIS — M54.16 LUMBAR RADICULOPATHY: ICD-10-CM

## 2022-08-13 PROCEDURE — 72148 MRI LUMBAR SPINE W/O DYE: CPT | Mod: TC,PO

## 2022-08-13 PROCEDURE — 72148 MRI LUMBAR SPINE W/O DYE: CPT | Mod: 26,,, | Performed by: RADIOLOGY

## 2022-08-13 PROCEDURE — 72148 MRI LUMBAR SPINE WITHOUT CONTRAST: ICD-10-PCS | Mod: 26,,, | Performed by: RADIOLOGY

## 2022-08-15 ENCOUNTER — TELEPHONE (OUTPATIENT)
Dept: SURGERY | Facility: HOSPITAL | Age: 42
End: 2022-08-15
Payer: COMMERCIAL

## 2022-08-15 ENCOUNTER — TELEPHONE (OUTPATIENT)
Dept: PAIN MEDICINE | Facility: CLINIC | Age: 42
End: 2022-08-15
Payer: COMMERCIAL

## 2022-08-15 DIAGNOSIS — M54.16 LUMBAR RADICULOPATHY: Primary | ICD-10-CM

## 2022-08-15 RX ORDER — SODIUM CHLORIDE, SODIUM LACTATE, POTASSIUM CHLORIDE, CALCIUM CHLORIDE 600; 310; 30; 20 MG/100ML; MG/100ML; MG/100ML; MG/100ML
INJECTION, SOLUTION INTRAVENOUS CONTINUOUS
Status: CANCELLED | OUTPATIENT
Start: 2022-08-15

## 2022-08-15 NOTE — TELEPHONE ENCOUNTER
I discussed MRI results with the patient.    He he has an extrusion at L5-S1 that has some cephalad migration against the posterior vertebral body wall.    He is having severe back pain that is limiting his mobility and interfering with his ADLs    We can schedule for a bilateral L5-S1 TFESI        Type of Procedure/Injection - Lumbar Transforaminal Epidural  L5/S1         Laterality - Bilateral    Type of Sedation - RNIV    Need to hold medication - no    N/A    Clearance needed - no    Follow up - 2 week

## 2022-08-15 NOTE — TELEPHONE ENCOUNTER
Good afternoon,     Mr. Bae last took a dose of Celebrex yesterday morning. Please call him should this affect his lumbar TF JONNATHAN scheduled for tomorrow morning (8/16).    Thank you!

## 2022-08-16 ENCOUNTER — HOSPITAL ENCOUNTER (OUTPATIENT)
Dept: RADIOLOGY | Facility: HOSPITAL | Age: 42
Discharge: HOME | End: 2022-08-16
Attending: ANESTHESIOLOGY | Admitting: ANESTHESIOLOGY
Payer: COMMERCIAL

## 2022-08-16 ENCOUNTER — HOSPITAL ENCOUNTER (OUTPATIENT)
Facility: HOSPITAL | Age: 42
Discharge: HOME | End: 2022-08-16
Attending: ANESTHESIOLOGY | Admitting: ANESTHESIOLOGY
Payer: COMMERCIAL

## 2022-08-16 VITALS
RESPIRATION RATE: 16 BRPM | SYSTOLIC BLOOD PRESSURE: 118 MMHG | WEIGHT: 165 LBS | BODY MASS INDEX: 24.44 KG/M2 | HEIGHT: 69 IN | DIASTOLIC BLOOD PRESSURE: 70 MMHG | OXYGEN SATURATION: 96 % | HEART RATE: 68 BPM

## 2022-08-16 DIAGNOSIS — M54.16 LUMBAR RADICULOPATHY: Primary | ICD-10-CM

## 2022-08-16 DIAGNOSIS — M54.50 LOWER BACK PAIN: ICD-10-CM

## 2022-08-16 PROCEDURE — 76000 FLUOROSCOPY <1 HR PHYS/QHP: CPT | Mod: TC,PO

## 2022-08-16 PROCEDURE — 64483 NJX AA&/STRD TFRM EPI L/S 1: CPT | Mod: 50,PO | Performed by: ANESTHESIOLOGY

## 2022-08-16 PROCEDURE — 64483 NJX AA&/STRD TFRM EPI L/S 1: CPT | Mod: 50,,, | Performed by: ANESTHESIOLOGY

## 2022-08-16 PROCEDURE — 64483 PR EPIDURAL INJ, ANES/STEROID, TRANSFORAMINAL, LUMB/SACR, SNGL LEVL: ICD-10-PCS | Mod: 50,,, | Performed by: ANESTHESIOLOGY

## 2022-08-16 PROCEDURE — 25500020 PHARM REV CODE 255: Mod: PO | Performed by: ANESTHESIOLOGY

## 2022-08-16 PROCEDURE — 63600175 PHARM REV CODE 636 W HCPCS: Mod: PO | Performed by: ANESTHESIOLOGY

## 2022-08-16 PROCEDURE — A4216 STERILE WATER/SALINE, 10 ML: HCPCS | Mod: PO | Performed by: ANESTHESIOLOGY

## 2022-08-16 PROCEDURE — 25000003 PHARM REV CODE 250: Mod: PO | Performed by: ANESTHESIOLOGY

## 2022-08-16 RX ORDER — MIDAZOLAM HYDROCHLORIDE 2 MG/2ML
INJECTION, SOLUTION INTRAMUSCULAR; INTRAVENOUS
Status: DISCONTINUED | OUTPATIENT
Start: 2022-08-16 | End: 2022-08-16 | Stop reason: HOSPADM

## 2022-08-16 RX ORDER — TRIAMCINOLONE ACETONIDE 40 MG/ML
INJECTION, SUSPENSION INTRA-ARTICULAR; INTRAMUSCULAR
Status: DISCONTINUED | OUTPATIENT
Start: 2022-08-16 | End: 2022-08-16 | Stop reason: HOSPADM

## 2022-08-16 RX ORDER — BUPIVACAINE HYDROCHLORIDE 2.5 MG/ML
INJECTION, SOLUTION EPIDURAL; INFILTRATION; INTRACAUDAL
Status: DISCONTINUED | OUTPATIENT
Start: 2022-08-16 | End: 2022-08-16 | Stop reason: HOSPADM

## 2022-08-16 RX ORDER — SODIUM CHLORIDE, SODIUM LACTATE, POTASSIUM CHLORIDE, CALCIUM CHLORIDE 600; 310; 30; 20 MG/100ML; MG/100ML; MG/100ML; MG/100ML
INJECTION, SOLUTION INTRAVENOUS CONTINUOUS
Status: DISCONTINUED | OUTPATIENT
Start: 2022-08-16 | End: 2022-08-16 | Stop reason: HOSPADM

## 2022-08-16 RX ORDER — FENTANYL CITRATE 50 UG/ML
INJECTION, SOLUTION INTRAMUSCULAR; INTRAVENOUS
Status: DISCONTINUED | OUTPATIENT
Start: 2022-08-16 | End: 2022-08-16 | Stop reason: HOSPADM

## 2022-08-16 RX ORDER — LIDOCAINE HYDROCHLORIDE 10 MG/ML
INJECTION, SOLUTION EPIDURAL; INFILTRATION; INTRACAUDAL; PERINEURAL
Status: DISCONTINUED | OUTPATIENT
Start: 2022-08-16 | End: 2022-08-16 | Stop reason: HOSPADM

## 2022-08-16 RX ORDER — SODIUM CHLORIDE 9 MG/ML
INJECTION, SOLUTION INTRAMUSCULAR; INTRAVENOUS; SUBCUTANEOUS
Status: DISCONTINUED | OUTPATIENT
Start: 2022-08-16 | End: 2022-08-16 | Stop reason: HOSPADM

## 2022-08-16 RX ADMIN — SODIUM CHLORIDE, SODIUM LACTATE, POTASSIUM CHLORIDE, AND CALCIUM CHLORIDE: .6; .31; .03; .02 INJECTION, SOLUTION INTRAVENOUS at 08:08

## 2022-08-16 NOTE — OP NOTE
Procedure Note    Procedure Date: 8/16/2022    Procedure Performed: bilateral Transforaminal Epidural @ L5/S1, with Fluoroscopic Guidance    Indications: Patient has failed conservative therapy.      Pre-op diagnosis: Lumbar Radiculopathy    Post-op diagnosis: same    Physician: Reno Yu MD    IV anxiolysis medications: versed 2mg, fentanyl 25mcg    Medications injected: 0.25% Bupivacaine 2ml, kenalog 40mg, 3 mL sterile, preservative-free normal saline.    Local anesthetic used: 1% Lidocaine 2 ml, 8.4% sodium bicarbonate 0.25ml    Estimated Blood Loss: none    Complications:  none    Technique:  The patient was interviewed in the holding area and Risks/Benefits were discussed, including, but not limited to, the possibility of new or different pain, bleeding or infection.   All questions were answered.  The patient understood and accepted risks.  Consent was reviewed.  A time out was taken to identify the patient, procedure and side of the procedure. The patient was placed in a prone position, then prepped and draped in the usual sterile fashion using ChloraPrep and sterile towels.  The Bilateral L5 neural foramena were identified under fluoroscopic guidance in AP and oblique view.  Local anesthetic was given by raising a wheal and going down to the hub of a 25-gauge 1.5 inch needle.  In oblique view, a 3.5 inch 22-gauge bent-tip spinal needle was introduced into the foramen @ L5 and positioned in the posterior superior quarter of the foramen.  .5cc of Omnipaque contrast was injected live in an AP fluoroscopic view at each level demonstrating appropriate needle position with contrast spread outlining the respective nerve root and also medially into the epidural space without intravascular or intrathecal spread.  Then, after negative aspiration, a mixture of 2 mL Bupivacaine 0.25%, 40 mg Kenalog, and 3 mL sterile, preservative-free normal saline. (total 5 mL) was divided evenly between the two levels and injected  slowly and incrementally.  The needle(s) was(were) flushed with normal saline and removed.  The patient tolerated the procedure well.  The patient was monitored after the procedure.  Patient was given post procedure and discharge instructions to follow at home. The patient was discharged in a stable condition.

## 2022-08-16 NOTE — INTERVAL H&P NOTE
The patient has been examined and the H&P has been reviewed:    I concur with the findings and changes have been noted since the H&P was written: MRI lumbar spine reviewed.  He has moderate b/l foraminal narrowing at L5/S1.  I looks like this central disc has extruded more in a cephalad direction compared to MRI in November 2021.  He is scheduled to meet with nsgy tomorrow.  He is having severe pain that is limiting his mobility and interfering with his ADL's.  We will proceed with b/l L5/S1 TFESI. The risks and benefits of this intervention, and alternative therapies were discussed with the patient.  The discussion of risks included infection, bleeding, need for additional procedures or surgery, nerve damage.  Questions regarding the procedure, risks, expected outcome, and possible side effects were solicited and answered to the patient's satisfaction.  Jorge Bae wishes to proceed with the injection or procedure.  Written consent was obtained.    There are no hospital problems to display for this patient.

## 2022-08-17 ENCOUNTER — OFFICE VISIT (OUTPATIENT)
Dept: NEUROSURGERY | Facility: CLINIC | Age: 42
End: 2022-08-17
Payer: COMMERCIAL

## 2022-08-17 VITALS
DIASTOLIC BLOOD PRESSURE: 82 MMHG | HEART RATE: 77 BPM | HEIGHT: 69 IN | WEIGHT: 164.88 LBS | RESPIRATION RATE: 18 BRPM | SYSTOLIC BLOOD PRESSURE: 129 MMHG | BODY MASS INDEX: 24.42 KG/M2

## 2022-08-17 DIAGNOSIS — M54.16 LUMBAR RADICULOPATHY: ICD-10-CM

## 2022-08-17 PROCEDURE — 99204 OFFICE O/P NEW MOD 45 MIN: CPT | Mod: S$GLB,,, | Performed by: STUDENT IN AN ORGANIZED HEALTH CARE EDUCATION/TRAINING PROGRAM

## 2022-08-17 PROCEDURE — 3008F BODY MASS INDEX DOCD: CPT | Mod: CPTII,S$GLB,, | Performed by: STUDENT IN AN ORGANIZED HEALTH CARE EDUCATION/TRAINING PROGRAM

## 2022-08-17 PROCEDURE — 1159F PR MEDICATION LIST DOCUMENTED IN MEDICAL RECORD: ICD-10-PCS | Mod: CPTII,S$GLB,, | Performed by: STUDENT IN AN ORGANIZED HEALTH CARE EDUCATION/TRAINING PROGRAM

## 2022-08-17 PROCEDURE — 3074F PR MOST RECENT SYSTOLIC BLOOD PRESSURE < 130 MM HG: ICD-10-PCS | Mod: CPTII,S$GLB,, | Performed by: STUDENT IN AN ORGANIZED HEALTH CARE EDUCATION/TRAINING PROGRAM

## 2022-08-17 PROCEDURE — 99204 PR OFFICE/OUTPT VISIT, NEW, LEVL IV, 45-59 MIN: ICD-10-PCS | Mod: S$GLB,,, | Performed by: STUDENT IN AN ORGANIZED HEALTH CARE EDUCATION/TRAINING PROGRAM

## 2022-08-17 PROCEDURE — 3008F PR BODY MASS INDEX (BMI) DOCUMENTED: ICD-10-PCS | Mod: CPTII,S$GLB,, | Performed by: STUDENT IN AN ORGANIZED HEALTH CARE EDUCATION/TRAINING PROGRAM

## 2022-08-17 PROCEDURE — 1159F MED LIST DOCD IN RCRD: CPT | Mod: CPTII,S$GLB,, | Performed by: STUDENT IN AN ORGANIZED HEALTH CARE EDUCATION/TRAINING PROGRAM

## 2022-08-17 PROCEDURE — 3074F SYST BP LT 130 MM HG: CPT | Mod: CPTII,S$GLB,, | Performed by: STUDENT IN AN ORGANIZED HEALTH CARE EDUCATION/TRAINING PROGRAM

## 2022-08-17 PROCEDURE — 3079F PR MOST RECENT DIASTOLIC BLOOD PRESSURE 80-89 MM HG: ICD-10-PCS | Mod: CPTII,S$GLB,, | Performed by: STUDENT IN AN ORGANIZED HEALTH CARE EDUCATION/TRAINING PROGRAM

## 2022-08-17 PROCEDURE — 3079F DIAST BP 80-89 MM HG: CPT | Mod: CPTII,S$GLB,, | Performed by: STUDENT IN AN ORGANIZED HEALTH CARE EDUCATION/TRAINING PROGRAM

## 2022-08-17 NOTE — PROGRESS NOTES
[facility]  Clinic Consult     Consult Requested By: Reno Yu MD  PCP: Karl Crowley MD    SUBJECTIVE:     Chief Complaint:   Chief Complaint   Patient presents with    Back Pain     Pain originates at L5 and at times spreads to buttocks and piriformis. Most of the pain is on the left side but he experiences pain on both sides.        History of Present Illness:  Jogre Bae III is a 41 y.o. male who presents for evaluation of low back pain. Patient reports the pain is present in the low back around L5 and in the piriformis region. It does not radiate down the legs. It will be aggravated by running, so he has stopped this. He reports it is worse with sitting and standing. It improves when lying supine with pillows under his knees. He reports when the pain is severe, every activity makes it worse. He has had to modify his activities due to pain. He received a L5-S1 ILESI on 7/22 with 1 week of relief. He noticed left EHL weakness last week. He underwent bilateral L5-S1 TFESI yesterday with improving pain and strength.     Pertinent and recent history, provider evaluations, imaging and data reviewed in EPIC        Past Medical History:   Diagnosis Date    Depression     Hematuria     Testosterone deficiency      Past Surgical History:   Procedure Laterality Date    deviated septum repair      EPIDURAL STEROID INJECTION INTO LUMBAR SPINE N/A 7/22/2022    Procedure: Injection-steroid-epidural-lumbar L5/S1;  Surgeon: Reno Yu MD;  Location: [facility] OR;  Service: Pain Management;  Laterality: N/A;    ESOPHAGOGASTRODUODENOSCOPY N/A 11/11/2020    Procedure: EGD (ESOPHAGOGASTRODUODENOSCOPY);  Surgeon: Yunior Stevens MD;  Location: [facility];  Service: Endoscopy;  Laterality: N/A;    INJECTION OF FACET JOINT Bilateral 11/3/2021    Procedure: INJECTION, FACET JOINT BILATERAL L4/5, L5/S1;  Surgeon: Mateus Vargas MD;  Location: [facility];   Service: Pain Management;  Laterality: Bilateral;    TRANSFORAMINAL EPIDURAL INJECTION OF STEROID Bilateral 8/16/2022    Procedure: Injection,steroid,epidural,transforaminal approach L5/S1;  Surgeon: Reno Yu MD;  Location: [facility];  Service: Pain Management;  Laterality: Bilateral;     Family History   Problem Relation Age of Onset    Alzheimer's disease Mother     Early death Mother     No Known Problems Father     Heart disease Maternal Grandfather      Social History     Tobacco Use    Smoking status: Never Smoker    Smokeless tobacco: Never Used   Substance Use Topics    Alcohol use: Not Currently     Comment: stopped in 2021, 4-6 times per week, 1/2 wine bottle per night    Drug use: Not Currently     Types: Amphetamines      Review of patient's allergies indicates:  No Known Allergies    Current Outpatient Medications:     atomoxetine (STRATTERA) 25 MG capsule, Take 25 mg by mouth 2 (two) times daily., Disp: , Rfl:     baclofen (LIORESAL) 20 MG tablet, Take 20 mg by mouth 3 (three) times daily., Disp: , Rfl:     buPROPion (WELLBUTRIN XL) 300 MG 24 hr tablet, Take 300 mg by mouth once daily., Disp: , Rfl:     celecoxib (CELEBREX) 200 MG capsule, Take 200 mg by mouth daily as needed., Disp: , Rfl:     ondansetron (ZOFRAN-ODT) 4 MG TbDL, Take 4 mg by mouth every 6 (six) hours as needed., Disp: , Rfl:     testosterone cypionate (DEPOTESTOTERONE CYPIONATE) 200 mg/mL injection, Inject 150 mg into the muscle every 7 days., Disp: , Rfl:     tiZANidine (ZANAFLEX) 4 MG tablet, TAKE 1 TABLET BY MOUTH NIGHTLY AS NEEDED., Disp: 90 tablet, Rfl: 2    traMADoL (ULTRAM) 50 mg tablet, Take 1 tablet (50 mg total) by mouth every 8 (eight) hours as needed for Pain., Disp: 21 tablet, Rfl: 0    Review of Systems:   Constitutional: no fever, chills or night sweats. No changes in weight   Eyes: no visual changes   ENT: no nasal congestion or sore throat   Respiratory: no cough or shortness of breath  "  Cardiovascular: no chest pain or palpitations   Gastrointestinal: no nausea or vomiting   Genitourinary: no hematuria or dysuria   Integument/Breast: no rash or pruritis   Hematologic/Lymphatic: no easy bruising or lymphadenopathy   Musculoskeletal: +back pain   Neurological: no seizures or tremors +weakness   Behavioral/Psych: no auditory or visual hallucinations   Endocrine: no heat or cold intolerance         OBJECTIVE:     Vital Signs (Most Recent):  Pulse: 77 (08/17/22 1251)  Resp: 18 (08/17/22 1251)  BP: 129/82 (08/17/22 1251)  Estimated body mass index is 24.35 kg/m² as calculated from the following:    Height as of this encounter: 5' 9" (1.753 m).    Weight as of this encounter: 74.8 kg (164 lb 14.5 oz).    Physical Exam:   General: well developed, well nourished, no distress.   Neurologic: Alert and oriented. Thought content appropriate. GCS 15.   Language: No aphasia  Speech: No dysarthria  Head: normocephalic, atraumatic  Eyes: EOMI.  Neck: trachea midline, no JVD   Cardiovascular: no LE edema  Pulmonary: normal respirations, no signs of respiratory distress  Abdomen: non-distended  Skin: Skin is warm, dry and intact.    Motor Strength: Moves all extremities spontaneously with good tone. No abnormal movements seen.     Strength  Deltoids Triceps Biceps Wrist Extension Wrist Flexion Hand  Interossei   Upper: R 5/5 5/5 5/5 5/5 5/5 5/5 5/5    L 5/5 5/5 5/5 5/5 5/5 5/5 5/5     Iliopsoas Quadriceps Knee  Flexion Tibialis  anterior Gastro- cnemius EHL    Lower: R 5/5 5/5 5/5 5/5 5/5 4+/5     L 5/5 5/5 5/5 5/5 5/5 5/5      DTR's: 1+  Gait: normal           Able to walk on heels & toes  Lumbar Spine: full ROM, increased pain with extension, no TTP          Diagnostic Results:  I have independently reviewed the following imaging:  MRI: Reviewed  L4-5, 5-1 spondylosis    8/22 c/w 11/21    L4-5 spondylosis  L5-S1 central disc sequestered behind L5 body but  with minimal mass effect      No central " stenosis        ASSESSMENT/PLAN:     Lumbar radiculopathy  -     Ambulatory referral/consult to Neurosurgery        Jorge Bae III is a 41 y.o. male    With recurrent flare ups of back pain and radiculopathy  recent ESIs, last yesterday feeling improvement  He has incomplete EHL weakness with resistance, he notes this is even better today  He denies functional abnormality with gait or his foot  MRI with lumbar spondylosis, L5-S1 DDD with sequestered central disc  No significant mass effect  Likely a major inflammatory component  Reviewed activity modification , loves golf,  escalation and s/s needing re-eval  His goals are reviewed and will cont f/u with Dr. Yu  PT/modalities will consider ; very active mainly for modalities/ needling  if spasms ect                  Patient verbalized understanding of plan. Encouraged to call with any questions or concerns.     This note was partially dictated using voice recognition software, so please excuse any errors that were not corrected.

## 2022-08-29 ENCOUNTER — PATIENT MESSAGE (OUTPATIENT)
Dept: PAIN MEDICINE | Facility: CLINIC | Age: 42
End: 2022-08-29
Payer: COMMERCIAL

## 2022-09-09 ENCOUNTER — OFFICE VISIT (OUTPATIENT)
Dept: PAIN MEDICINE | Facility: CLINIC | Age: 42
End: 2022-09-09
Payer: COMMERCIAL

## 2022-09-09 DIAGNOSIS — M54.9 DORSALGIA, UNSPECIFIED: Primary | ICD-10-CM

## 2022-09-09 PROCEDURE — 99212 PR OFFICE/OUTPT VISIT, EST, LEVL II, 10-19 MIN: ICD-10-PCS | Mod: 95,,, | Performed by: ANESTHESIOLOGY

## 2022-09-09 PROCEDURE — 1160F RVW MEDS BY RX/DR IN RCRD: CPT | Mod: CPTII,95,, | Performed by: ANESTHESIOLOGY

## 2022-09-09 PROCEDURE — 1159F MED LIST DOCD IN RCRD: CPT | Mod: CPTII,95,, | Performed by: ANESTHESIOLOGY

## 2022-09-09 PROCEDURE — 99212 OFFICE O/P EST SF 10 MIN: CPT | Mod: 95,,, | Performed by: ANESTHESIOLOGY

## 2022-09-09 PROCEDURE — 1160F PR REVIEW ALL MEDS BY PRESCRIBER/CLIN PHARMACIST DOCUMENTED: ICD-10-PCS | Mod: CPTII,95,, | Performed by: ANESTHESIOLOGY

## 2022-09-09 PROCEDURE — 1159F PR MEDICATION LIST DOCUMENTED IN MEDICAL RECORD: ICD-10-PCS | Mod: CPTII,95,, | Performed by: ANESTHESIOLOGY

## 2022-09-09 NOTE — PROGRESS NOTES
The patient location is: [facility]  The chief complaint leading to consultation is: back pain    Visit type: audiovisual    Face to Face time with patient: 5  11 minutes of total time spent on the encounter, which includes face to face time and non-face to face time preparing to see the patient (eg, review of tests), Obtaining and/or reviewing separately obtained history, Documenting clinical information in the electronic or other health record, Independently interpreting results (not separately reported) and communicating results to the patient/family/caregiver, or Care coordination (not separately reported).     Each patient to whom he or she provides medical services by telemedicine is:  (1) informed of the relationship between the physician and patient and the respective role of any other health care provider with respect to management of the patient; and (2) notified that he or she may decline to receive medical services by telemedicine and may withdraw from such care at any time.    Notes:     [facility] Follow Up Evaluation      CC:   No chief complaint on file.     Last 3 PDI Scores 7/20/2022 10/28/2021   Pain Disability Index (PDI) 40 19       Interval HPI 9/9/22: Mr. Bae returns via telemedicine for follow-up.  He is status post bilateral L5-S1 TFESI on 08/16/2022.  When I previously saw him he had some weakness of the left EHL.  The weakness had improved during his neurosurgical follow-up and he continues to say today that he feels like he is having improvement in this.  He is not having any new or worsening radicular pain, numbness, or weakness.  He has been active with swimming and PT directed home exercise.    Pain intervention history:   - s/p bilateral L4/5 and L5/S1 facet joint injections on 11/3/21  - s/p L5-S1 on 07/22/2022 and reports 85-90% relief   - s/p bilateral L5-S1 TFESI on 08/16/2022    HPI:   Jorge Bae III is a 41 y.o. male who presents with back pain.  He  has had chronic lower back pain for the past few years however he reports that a couple days ago he was playing golf and experienced sharp, sudden severe lower back pain.  Today he reports his pain is 8/10, aching, sharp with radiation into his bilateral buttocks and down the back of his his thighs.  He denies any numbness or weakness.  His pain is worse with standing and walking and relieved with sitting and lying down.    History:    Current Outpatient Medications:     atomoxetine (STRATTERA) 25 MG capsule, Take 25 mg by mouth 2 (two) times daily., Disp: , Rfl:     baclofen (LIORESAL) 20 MG tablet, Take 20 mg by mouth 3 (three) times daily., Disp: , Rfl:     buPROPion (WELLBUTRIN XL) 300 MG 24 hr tablet, Take 300 mg by mouth once daily., Disp: , Rfl:     celecoxib (CELEBREX) 200 MG capsule, Take 200 mg by mouth daily as needed., Disp: , Rfl:     ondansetron (ZOFRAN-ODT) 4 MG TbDL, Take 4 mg by mouth every 6 (six) hours as needed., Disp: , Rfl:     testosterone cypionate (DEPOTESTOTERONE CYPIONATE) 200 mg/mL injection, Inject 150 mg into the muscle every 7 days., Disp: , Rfl:     tiZANidine (ZANAFLEX) 4 MG tablet, TAKE 1 TABLET BY MOUTH NIGHTLY AS NEEDED., Disp: 90 tablet, Rfl: 2    traMADoL (ULTRAM) 50 mg tablet, Take 1 tablet (50 mg total) by mouth every 8 (eight) hours as needed for Pain., Disp: 21 tablet, Rfl: 0    Past Medical History:   Diagnosis Date    Depression     Hematuria     Testosterone deficiency        Past Surgical History:   Procedure Laterality Date    deviated septum repair      EPIDURAL STEROID INJECTION INTO LUMBAR SPINE N/A 7/22/2022    Procedure: Injection-steroid-epidural-lumbar L5/S1;  Surgeon: Reno Yu MD;  Location: [facility] OR;  Service: Pain Management;  Laterality: N/A;    ESOPHAGOGASTRODUODENOSCOPY N/A 11/11/2020    Procedure: EGD (ESOPHAGOGASTRODUODENOSCOPY);  Surgeon: Yunior Stevens MD;  Location: [facility] ENDO;  Service: Endoscopy;  Laterality: N/A;    INJECTION OF FACET JOINT  Bilateral 11/3/2021    Procedure: INJECTION, FACET JOINT BILATERAL L4/5, L5/S1;  Surgeon: Mateus Vargas MD;  [facility];  Service: Pain Management;  Laterality: Bilateral;    TRANSFORAMINAL EPIDURAL INJECTION OF STEROID Bilateral 8/16/2022    Procedure: Injection,steroid,epidural,transforaminal approach L5/S1;  Surgeon: Reno Yu MD;  Location: [facility] OR;  Service: Pain Management;  Laterality: Bilateral;       Family History   Problem Relation Age of Onset    Alzheimer's disease Mother     Early death Mother     No Known Problems Father     Heart disease Maternal Grandfather        Social History     Socioeconomic History    Marital status:    Tobacco Use    Smoking status: Never    Smokeless tobacco: Never   Substance and Sexual Activity    Alcohol use: Not Currently     Comment: stopped in 2021, 4-6 times per week, 1/2 wine bottle per night    Drug use: Not Currently     Types: Amphetamines    Sexual activity: Yes     Partners: Female     Birth control/protection: None     Comment: partner has Mirena   Social History Narrative    January 4    Zia 16 mos    Works as .     Social Determinants of Health     Financial Resource Strain: Low Risk     Difficulty of Paying Living Expenses: Not hard at all   Food Insecurity: No Food Insecurity    Worried About Running Out of Food in the Last Year: Never true    Ran Out of Food in the Last Year: Never true   Transportation Needs: No Transportation Needs    Lack of Transportation (Medical): No    Lack of Transportation (Non-Medical): No   Physical Activity: Sufficiently Active    Days of Exercise per Week: 7 days    Minutes of Exercise per Session: 60 min   Stress: Stress Concern Present    Feeling of Stress : To some extent   Social Connections: Unknown    Frequency of Communication with Friends and Family: Three times a week    Frequency of Social Gatherings with Friends and Family: Once a week    Active Member of Clubs or  Organizations: No    Attends Club or Organization Meetings: More than 4 times per year    Marital Status:    Housing Stability: Low Risk     Unable to Pay for Housing in the Last Year: No    Number of Places Lived in the Last Year: 1    Unstable Housing in the Last Year: No       Review of patient's allergies indicates:  No Known Allergies    Review of Systems:  General ROS: negative for - fever  Psychological ROS: negative for - hostility  Hematological and Lymphatic ROS: negative for - bleeding problems  Endocrine ROS: negative for - unexpected weight changes  Respiratory ROS: no cough, shortness of breath, or wheezing  Cardiovascular ROS: no chest pain or dyspnea on exertion  Gastrointestinal ROS: no abdominal pain, change in bowel habits, or black or bloody stools  Musculoskeletal ROS: negative for - muscular weakness  Neurological ROS: negative for - numbness/tingling  Dermatological ROS: negative for rash    Physical Exam:  There were no vitals filed for this visit.    There is no height or weight on file to calculate BMI.    Gen: NAD  Gait: gait intact  Psych:  Mood appropriate for given condition  HEENT: eyes anicteric   GI: Abd soft  CV: RRR  Lungs: breathing unlabored   ROM: limited AROM of the L spine in all planes, full ROM at ankles, knees and hips  Sensation: intact to light touch in all dermatomes tested from L2-S1 bilaterally  Reflexes: 0 b/l patella and achilles  Tone: normal in the b/l knees and hips   Skin: intact  Extremities: No edema in b/l ankles or hands  Provacative tests:        Right Left   L2/3 Iliacus Hip flexion  5  5   L3/4 Qudratus Femoris Knee Extension  5  5   L4/5 Tib Anterior Ankle Dorsiflexion   5  5   L5/S1 Extensor Hallicus Longus Great toe extension  5  2+ to 3-                 S1/S2 Gastroc/Soleus Plantar Flexion  5  5       Imaging:  MRI lumbar spine 11/9/21  FINDINGS:  The lumbar spine demonstrates proper alignment. The vertebral bodies show normal signal intensity  and height with no indication of acute fracture or pathologic marrow replacement process.  Multilevel disc desiccation most prominent L5-S1.     The demonstrated portion of the spinal cord is normal in signal intensity at all levels with no indication of myelomalacia or cord edema. Conus terminates at L1.  Evaluation of the surrounding soft tissue structures demonstrates no acute abnormality.     Degenerative findings:     T12-L1: No significant spinal canal stenosis or neural foraminal narrowing.  L1-L2: No significant spinal canal stenosis or neural foraminal narrowing.  L2-L3: No significant spinal canal stenosis or neural foraminal narrowing.  L3-L4: Mild right facet arthropathy.  No canal or foraminal stenosis.  L4-L5: Disc bulge with central annular fissure.  Bilateral facet arthropathy.  No canal stenosis.  Mild bilateral foraminal stenosis.  L5-S1: Disc bulge with central extrusion and cranial migration.  Mild canal stenosis.  Moderate bilateral foraminal stenosis.  Mild edema in the posterior aspect of the L5 inferior endplate.    MRI lumbar spine 8/13/22  FINDINGS:  The marrow signal is within normal limits.  The conus lies at L1 vertebral body level.  L1/2: There are no significant degenerative changes at this level.     L2/3: There are no significant degenerative changes at this level.  L3/4: There are no significant degenerative changes at this level.  L4/5: There is a posteriorly oriented disc protrusion with small annular tear.  L5/S1: There is a posteriorly oriented disc extrusion with moderate narrowing of bilateral neural foramina.    Labs:  BMP  Lab Results   Component Value Date     02/14/2022    K 5.1 02/14/2022     02/14/2022    CO2 27 02/14/2022    BUN 26 (H) 02/14/2022    CREATININE 1.1 02/14/2022    CALCIUM 10.3 02/14/2022    ANIONGAP 7 (L) 02/14/2022    ESTGFRAFRICA >60 02/14/2022    EGFRNONAA >60 02/14/2022     Lab Results   Component Value Date    ALT 47 (H) 06/29/2022    AST  43 (H) 06/29/2022    ALKPHOS 66 06/29/2022    BILITOT 1.0 06/29/2022       Assessment:   Problem List Items Addressed This Visit    None  Visit Diagnoses       Dorsalgia, unspecified    -  Primary            41 y.o. year old male who presents with back pain.     9/9/22 - Mr. Bae returns via telemedicine for follow-up.  He is status post bilateral L5-S1 TFESI on 08/16/2022.  When I previously saw him he had some weakness of the left EHL.  The weakness had improved during his neurosurgical follow-up and he continues to say today that he feels like he is having improvement in this.  He is not having any new radicular pain.  He is not having any new or worsening radicular pain, numbness, or weakness.  I independently reviewed his lumbar MRI today and he has posterior disc extrusion at L5-S1 that I think was causing his pain.  At this time he is going to maximize and maintain conservative care.  Follow-up as needed.  Future targets can be considered such as repeat JONNATHAN versus diagnostic medial branch blocks    : Not applicable    Reno Yu M.D.  Interventional Pain Medicine / Anesthesiology    This note was completed with dictation software and grammatical errors may exist.

## 2022-10-10 RX ORDER — METHYLPREDNISOLONE 4 MG/1
TABLET ORAL
Qty: 1 EACH | Refills: 0 | Status: SHIPPED | OUTPATIENT
Start: 2022-10-10 | End: 2022-10-31

## 2022-10-10 RX ORDER — TRAMADOL HYDROCHLORIDE 50 MG/1
50 TABLET ORAL EVERY 8 HOURS PRN
Qty: 21 TABLET | Refills: 0 | Status: SHIPPED | OUTPATIENT
Start: 2022-10-10 | End: 2022-12-19 | Stop reason: SDUPTHER

## 2022-12-19 ENCOUNTER — TELEPHONE (OUTPATIENT)
Dept: PAIN MEDICINE | Facility: CLINIC | Age: 42
End: 2022-12-19
Payer: COMMERCIAL

## 2022-12-19 DIAGNOSIS — M54.9 DORSALGIA, UNSPECIFIED: Primary | ICD-10-CM

## 2022-12-19 RX ORDER — TIZANIDINE 2 MG/1
4 TABLET ORAL EVERY 12 HOURS PRN
Qty: 30 TABLET | Refills: 1 | Status: SHIPPED | OUTPATIENT
Start: 2022-12-19 | End: 2022-12-29

## 2022-12-19 RX ORDER — TRAMADOL HYDROCHLORIDE 50 MG/1
50 TABLET ORAL EVERY 8 HOURS PRN
Qty: 21 TABLET | Refills: 0 | Status: SHIPPED | OUTPATIENT
Start: 2022-12-19 | End: 2023-03-15

## 2022-12-19 RX ORDER — METHYLPREDNISOLONE 4 MG/1
TABLET ORAL
Qty: 1 EACH | Refills: 0 | Status: SHIPPED | OUTPATIENT
Start: 2022-12-19 | End: 2023-01-09

## 2022-12-19 NOTE — TELEPHONE ENCOUNTER
Patient messaged, back tightening up on him.    Medrol dose pack sent for inflammatory pain.  He has tizanidine for myofascial pain.  I've sent in a few tramadol for emergency prn use.  Follow up to consider repeat injection if pain doesn't improve.

## 2023-02-27 ENCOUNTER — TELEPHONE (OUTPATIENT)
Dept: PAIN MEDICINE | Facility: CLINIC | Age: 43
End: 2023-02-27
Payer: COMMERCIAL

## 2023-02-27 RX ORDER — TIZANIDINE 4 MG/1
4 TABLET ORAL NIGHTLY PRN
Qty: 60 TABLET | Refills: 0 | Status: SHIPPED | OUTPATIENT
Start: 2023-02-27 | End: 2023-03-15 | Stop reason: SDUPTHER

## 2023-03-08 ENCOUNTER — TELEPHONE (OUTPATIENT)
Dept: OTOLARYNGOLOGY | Facility: CLINIC | Age: 43
End: 2023-03-08
Payer: COMMERCIAL

## 2023-03-08 NOTE — TELEPHONE ENCOUNTER
----- Message from Logan Camacho MD sent at 3/8/2023  7:56 AM CST -----  This is Dr. Elena Mai's . He's been having a lot of snoring issues and asked me to evaluate. Can you schedule him to see me tomorrow afternoon? If he is available, 130 would be good. Can you also move my post-op to 130?

## 2023-03-15 ENCOUNTER — OFFICE VISIT (OUTPATIENT)
Dept: OTOLARYNGOLOGY | Facility: CLINIC | Age: 43
End: 2023-03-15
Payer: COMMERCIAL

## 2023-03-15 VITALS — BODY MASS INDEX: 26.81 KG/M2 | WEIGHT: 181 LBS | HEIGHT: 69 IN

## 2023-03-15 DIAGNOSIS — J01.90 ACUTE NON-RECURRENT SINUSITIS, UNSPECIFIED LOCATION: ICD-10-CM

## 2023-03-15 DIAGNOSIS — G47.30 SLEEP DISORDER BREATHING: ICD-10-CM

## 2023-03-15 DIAGNOSIS — J34.2 NASAL SEPTAL DEVIATION: ICD-10-CM

## 2023-03-15 DIAGNOSIS — R06.83 SNORING: Primary | ICD-10-CM

## 2023-03-15 DIAGNOSIS — J34.89 NASAL VESTIBULITIS: ICD-10-CM

## 2023-03-15 PROCEDURE — 31575 DIAGNOSTIC LARYNGOSCOPY: CPT | Mod: S$GLB,,, | Performed by: OTOLARYNGOLOGY

## 2023-03-15 PROCEDURE — 99999 PR PBB SHADOW E&M-EST. PATIENT-LVL III: CPT | Mod: PBBFAC,,, | Performed by: OTOLARYNGOLOGY

## 2023-03-15 PROCEDURE — 99999 PR PBB SHADOW E&M-EST. PATIENT-LVL III: ICD-10-PCS | Mod: PBBFAC,,, | Performed by: OTOLARYNGOLOGY

## 2023-03-15 PROCEDURE — 1160F PR REVIEW ALL MEDS BY PRESCRIBER/CLIN PHARMACIST DOCUMENTED: ICD-10-PCS | Mod: CPTII,S$GLB,, | Performed by: OTOLARYNGOLOGY

## 2023-03-15 PROCEDURE — 99203 PR OFFICE/OUTPT VISIT, NEW, LEVL III, 30-44 MIN: ICD-10-PCS | Mod: 25,S$GLB,, | Performed by: OTOLARYNGOLOGY

## 2023-03-15 PROCEDURE — 3008F PR BODY MASS INDEX (BMI) DOCUMENTED: ICD-10-PCS | Mod: CPTII,S$GLB,, | Performed by: OTOLARYNGOLOGY

## 2023-03-15 PROCEDURE — 1159F MED LIST DOCD IN RCRD: CPT | Mod: CPTII,S$GLB,, | Performed by: OTOLARYNGOLOGY

## 2023-03-15 PROCEDURE — 99203 OFFICE O/P NEW LOW 30 MIN: CPT | Mod: 25,S$GLB,, | Performed by: OTOLARYNGOLOGY

## 2023-03-15 PROCEDURE — 1159F PR MEDICATION LIST DOCUMENTED IN MEDICAL RECORD: ICD-10-PCS | Mod: CPTII,S$GLB,, | Performed by: OTOLARYNGOLOGY

## 2023-03-15 PROCEDURE — 1160F RVW MEDS BY RX/DR IN RCRD: CPT | Mod: CPTII,S$GLB,, | Performed by: OTOLARYNGOLOGY

## 2023-03-15 PROCEDURE — 31575 PR LARYNGOSCOPY, FLEXIBLE; DIAGNOSTIC: ICD-10-PCS | Mod: S$GLB,,, | Performed by: OTOLARYNGOLOGY

## 2023-03-15 PROCEDURE — 3008F BODY MASS INDEX DOCD: CPT | Mod: CPTII,S$GLB,, | Performed by: OTOLARYNGOLOGY

## 2023-03-15 RX ORDER — MUPIROCIN 20 MG/G
OINTMENT TOPICAL 2 TIMES DAILY
Qty: 22 G | Refills: 1 | Status: SHIPPED | OUTPATIENT
Start: 2023-03-15 | End: 2023-03-22

## 2023-03-15 RX ORDER — AMOXICILLIN 875 MG/1
875 TABLET, FILM COATED ORAL EVERY 12 HOURS
Qty: 20 TABLET | Refills: 0 | Status: SHIPPED | OUTPATIENT
Start: 2023-03-15 | End: 2023-03-25

## 2023-03-15 NOTE — PATIENT INSTRUCTIONS
Flonase 2 sprays twice daily  Saline daily to twice daily  Nasal ointment for 7 days  Amoxicillin x 10 days    Message me in 2 weeks with how you are doing.  Can add 1-2 additional meds based on how nose is feeling relative to snoring / sleep issue.  May need to consider sleep study

## 2023-03-15 NOTE — PROGRESS NOTES
Subjective:       Patient ID: Jorge Bae III is a 42 y.o. male.    Chief Complaint: Snoring    Jorge is here for snoring.   Has occurred for many years but he and wife have noted worsening for > 6 months. He has also noted decreased sleep quality during this time and feeling of un refreshing sleep. At times will have severe daytime fatigue.   He has had associated nasal congestion during this time, but hasn't been treated for rhinitis or sinusitis. No recent medication changes. Had been on some mild muscle relaxers in the past for chronic back issues but not currently on them and snoring still present. Does drink on weekends, expectedly worse but still moderate to significant on most nights.   He generally sleeps 6-8 hours. Exercises regularly.    He has used an OA but developed jaw pain as a result. Hard to sleep with it in, but he does have it available. There are some concerns for apnea. He does wake up multiple times per night.   + lumbar spine history with herniated disc.   + testroterone injections longterm. Last T normal.  History of laser septoplasty on SS years ago.      Patient validated questionnaires (if applicable):      %       No flowsheet data found.  No flowsheet data found.  No flowsheet data found.         Social History     Tobacco Use   Smoking Status Never   Smokeless Tobacco Never     Social History     Substance and Sexual Activity   Alcohol Use Not Currently    Comment: stopped in 2021, 4-6 times per week, 1/2 wine bottle per night          Objective:        Constitutional:   He is oriented to person, place, and time. He appears well-developed and well-nourished. He appears alert. He is active. Normal speech.      Head:  Normocephalic and atraumatic. Head is without TMJ tenderness. No scars. Salivary glands normal.  Facial strength is normal.      Ears:    Right Ear: No drainage or swelling. No middle ear effusion.   Left Ear: No drainage or swelling.  No middle ear effusion.      Nose:  Mucosal edema, rhinorrhea (thick, opaque) and septal deviation present. No sinus tenderness. Turbinate hypertrophy.      Mouth/Throat  Oropharynx clear and moist without lesions or asymmetry, normal uvula midline and mirror exam normal. Normal dentition. No uvula swelling, lacerations or trismus. No oropharyngeal exudate. Tonsillar erythema, tonsillar exudate.    Tongue palate French II      Neck:  Full range of motion with neck supple and no adenopathy. Thyroid tenderness is present. No tracheal deviation, no edema, no erythema, normal range of motion, no stridor, no crepitus and no neck rigidity present. No thyroid mass present.     Cardiovascular:    Intact distal pulses and normal pulses.              Pulmonary/Chest:   Effort normal and breath sounds normal. No stridor.     Psychiatric:   His speech is normal and behavior is normal. His mood appears not anxious. His affect is not labile.     Neurological:   He is alert and oriented to person, place, and time. No sensory deficit.     Skin:   No abrasions, lacerations, lesions, or rashes. No abrasion and no bruising noted.       Tests / Results:  Pre-procedure diagnosis: The primary encounter diagnosis was Snoring. Diagnoses of Nasal vestibulitis, Acute non-recurrent sinusitis, unspecified location, Sleep disorder breathing, and Nasal septal deviation were also pertinent to this visit.     Post-procedure diagnosis: same    Procedure: Flexible fiberoptic laryngoscopy    Surgeon: Logan Camacho MD    Anesthesia: 4% Lidocaine with 0.25% Phenylephrine topical    Risks, benefits, and alternatives of the procedure were discussed with the patient, and the patient consented to the fiberoptic examination.  We applied a topical nasal decongestant and analgesic.  After adequate anesthesia was obtained, the flexible fiberoptic scope was passed through the nasal cavity. The entire pharynx (nasopharynx to hypopharynx) and the larynx were visualized. At the end of  the examination, the scope was removed. The patient tolerated the procedure well with no complications.     Findings:  -     Laryngeal mucosa is normal  -     Post-cricoid region: normal  -     Lingual tonsils have Grade 1-2 hypertrophy  -     Adenoids have NO  hypertrophy  -     Right vocal fold: normal mobility     mass/lesion: none  -     Left vocal fold: normal mobility     mass/lesion: none  -     Other findings: Slight opening of hypopharyngeal airway with tongue protrusion No epiglottic retroflexion.    Assessment:       1. Snoring    2. Nasal vestibulitis    3. Acute non-recurrent sinusitis, unspecified location    4. Sleep disorder breathing    5. Nasal septal deviation          Plan:         Clinically with vestibulitis and sinusitis. He feels baseline is worse but unsure of time course. Will treat with Amoxicillin and Mupirocin. Add INS back in and saline.  Message with update on progress on 2 weeks. Can consider to work on nose if making a lot of improvement, otherwise will obtain PSG based on symptoms to further stratify.

## 2023-06-11 ENCOUNTER — PATIENT MESSAGE (OUTPATIENT)
Dept: OTOLARYNGOLOGY | Facility: CLINIC | Age: 43
End: 2023-06-11
Payer: COMMERCIAL

## 2023-06-11 DIAGNOSIS — G47.33 OSA (OBSTRUCTIVE SLEEP APNEA): Primary | ICD-10-CM

## 2023-06-11 DIAGNOSIS — R06.83 SNORING: ICD-10-CM

## 2023-06-19 ENCOUNTER — OFFICE VISIT (OUTPATIENT)
Dept: PAIN MEDICINE | Facility: CLINIC | Age: 43
End: 2023-06-19
Payer: COMMERCIAL

## 2023-06-19 VITALS — HEIGHT: 69 IN | WEIGHT: 182.19 LBS | BODY MASS INDEX: 26.98 KG/M2

## 2023-06-19 DIAGNOSIS — M54.9 DORSALGIA, UNSPECIFIED: ICD-10-CM

## 2023-06-19 DIAGNOSIS — M54.16 LUMBAR RADICULOPATHY: Primary | ICD-10-CM

## 2023-06-19 PROCEDURE — 1160F PR REVIEW ALL MEDS BY PRESCRIBER/CLIN PHARMACIST DOCUMENTED: ICD-10-PCS | Mod: CPTII,S$GLB,, | Performed by: ANESTHESIOLOGY

## 2023-06-19 PROCEDURE — 99999 PR PBB SHADOW E&M-EST. PATIENT-LVL IV: CPT | Mod: PBBFAC,,, | Performed by: ANESTHESIOLOGY

## 2023-06-19 PROCEDURE — 1160F RVW MEDS BY RX/DR IN RCRD: CPT | Mod: CPTII,S$GLB,, | Performed by: ANESTHESIOLOGY

## 2023-06-19 PROCEDURE — 1159F MED LIST DOCD IN RCRD: CPT | Mod: CPTII,S$GLB,, | Performed by: ANESTHESIOLOGY

## 2023-06-19 PROCEDURE — 99214 PR OFFICE/OUTPT VISIT, EST, LEVL IV, 30-39 MIN: ICD-10-PCS | Mod: S$GLB,,, | Performed by: ANESTHESIOLOGY

## 2023-06-19 PROCEDURE — 1159F PR MEDICATION LIST DOCUMENTED IN MEDICAL RECORD: ICD-10-PCS | Mod: CPTII,S$GLB,, | Performed by: ANESTHESIOLOGY

## 2023-06-19 PROCEDURE — 99999 PR PBB SHADOW E&M-EST. PATIENT-LVL IV: ICD-10-PCS | Mod: PBBFAC,,, | Performed by: ANESTHESIOLOGY

## 2023-06-19 PROCEDURE — 3008F PR BODY MASS INDEX (BMI) DOCUMENTED: ICD-10-PCS | Mod: CPTII,S$GLB,, | Performed by: ANESTHESIOLOGY

## 2023-06-19 PROCEDURE — 3008F BODY MASS INDEX DOCD: CPT | Mod: CPTII,S$GLB,, | Performed by: ANESTHESIOLOGY

## 2023-06-19 PROCEDURE — 99214 OFFICE O/P EST MOD 30 MIN: CPT | Mod: S$GLB,,, | Performed by: ANESTHESIOLOGY

## 2023-06-19 RX ORDER — SODIUM CHLORIDE, SODIUM LACTATE, POTASSIUM CHLORIDE, CALCIUM CHLORIDE 600; 310; 30; 20 MG/100ML; MG/100ML; MG/100ML; MG/100ML
INJECTION, SOLUTION INTRAVENOUS CONTINUOUS
Status: CANCELLED | OUTPATIENT
Start: 2023-06-19

## 2023-06-19 RX ORDER — TRAMADOL HYDROCHLORIDE 50 MG/1
50 TABLET ORAL EVERY 8 HOURS PRN
Qty: 21 TABLET | Refills: 0 | Status: SHIPPED | OUTPATIENT
Start: 2023-06-19 | End: 2023-10-16

## 2023-06-19 NOTE — PROGRESS NOTES
[facility] Follow Up Evaluation      CC:   Chief Complaint   Patient presents with    Low-back Pain     Left sided        Last 3 PDI Scores 7/20/2022 10/28/2021   Pain Disability Index (PDI) 40 19       Interval HPI 6/19/23: Mr. Bae returns to the office for follow-up.  Today he reports worsening back pain that happened over the past few days.  He reports he was moving into a new house and was lifting boxes when the pain worsened.  He can get radiating pain down the left greater than right buttocks.   He is not having any new numbness or weakness in the lower extremities.  He does report over the past few months he has been waking up with some numbness into the 3rd and 4th digit left greater than right of his hands.    Pain intervention history:   - s/p bilateral L4/5 and L5/S1 facet joint injections on 11/3/21  - s/p L5-S1 on 07/22/2022 and reports 85-90% relief   - s/p bilateral L5-S1 TFESI on 08/16/2022 with over 90% relief    HPI:   Jorge Bae III is a 42 y.o. male who presents with back pain.  He has had chronic lower back pain for the past few years however he reports that a couple days ago he was playing golf and experienced sharp, sudden severe lower back pain.  Today he reports his pain is 8/10, aching, sharp with radiation into his bilateral buttocks and down the back of his his thighs.  He denies any numbness or weakness.  His pain is worse with standing and walking and relieved with sitting and lying down.    History:    Current Outpatient Medications:     atomoxetine (STRATTERA) 25 MG capsule, Take 25 mg by mouth 2 (two) times daily., Disp: , Rfl:     baclofen (LIORESAL) 20 MG tablet, Take 20 mg by mouth 3 (three) times daily., Disp: , Rfl:     buPROPion (WELLBUTRIN XL) 300 MG 24 hr tablet, Take 300 mg by mouth once daily., Disp: , Rfl:     celecoxib (CELEBREX) 200 MG capsule, Take 200 mg by mouth daily as needed., Disp: , Rfl:     ondansetron (ZOFRAN-ODT) 4 MG TbDL, Take 4 mg by  mouth every 6 (six) hours as needed., Disp: , Rfl:     testosterone cypionate (DEPOTESTOTERONE CYPIONATE) 200 mg/mL injection, Inject 150 mg into the muscle every 7 days., Disp: , Rfl:     tiZANidine (ZANAFLEX) 4 MG tablet, Take 1 tablet (4 mg total) by mouth nightly as needed., Disp: 90 tablet, Rfl: 2    traMADoL (ULTRAM) 50 mg tablet, Take 1 tablet (50 mg total) by mouth every 8 (eight) hours as needed for Pain., Disp: 21 tablet, Rfl: 0    Past Medical History:   Diagnosis Date    Depression     Hematuria     Testosterone deficiency        Past Surgical History:   Procedure Laterality Date    deviated septum repair      EPIDURAL STEROID INJECTION INTO LUMBAR SPINE N/A 7/22/2022    Procedure: Injection-steroid-epidural-lumbar L5/S1;  Surgeon: Reno Yu MD;  Location: [facility] OR;  Service: Pain Management;  Laterality: N/A;    ESOPHAGOGASTRODUODENOSCOPY N/A 11/11/2020    Procedure: EGD (ESOPHAGOGASTRODUODENOSCOPY);  Surgeon: Yunior Stevens MD;  Location: [facility];  Service: Endoscopy;  Laterality: N/A;    INJECTION OF FACET JOINT Bilateral 11/3/2021    Procedure: INJECTION, FACET JOINT BILATERAL L4/5, L5/S1;  Surgeon: Mateus Vargas MD;  Location: [facility];  Service: Pain Management;  Laterality: Bilateral;    TRANSFORAMINAL EPIDURAL INJECTION OF STEROID Bilateral 8/16/2022    Procedure: Injection,steroid,epidural,transforaminal approach L5/S1;  Surgeon: Reno Yu MD;  Location: [facility] OR;  Service: Pain Management;  Laterality: Bilateral;       Family History   Problem Relation Age of Onset    Alzheimer's disease Mother     Early death Mother     No Known Problems Father     Heart disease Maternal Grandfather        Social History     Socioeconomic History    Marital status:    Tobacco Use    Smoking status: Never    Smokeless tobacco: Never   Substance and Sexual Activity    Alcohol use: Not Currently     Comment: stopped in 2021, 4-6 times per week, 1/2 wine bottle per night    Drug use:  "Not Currently     Types: Amphetamines    Sexual activity: Yes     Partners: Female     Birth control/protection: None     Comment: partner has Mirena   Social History Narrative    January Barragan    Zia 16 mos    Works as .       Review of patient's allergies indicates:  No Known Allergies    Review of Systems:  General ROS: negative for - fever  Psychological ROS: negative for - hostility  Hematological and Lymphatic ROS: negative for - bleeding problems  Endocrine ROS: negative for - unexpected weight changes  Respiratory ROS: no cough, shortness of breath, or wheezing  Cardiovascular ROS: no chest pain or dyspnea on exertion  Gastrointestinal ROS: no abdominal pain, change in bowel habits, or black or bloody stools  Musculoskeletal ROS: negative for - muscular weakness  Neurological ROS: negative for - numbness/tingling  Dermatological ROS: negative for rash    Physical Exam:  Vitals:    06/19/23 1454   Weight: 82.6 kg (182 lb 3.4 oz)   Height: 5' 9" (1.753 m)   PainSc:   8   PainLoc: Back       Body mass index is 26.91 kg/m².    Gen: NAD  Gait: gait intact  Psych:  Mood appropriate for given condition  HEENT: eyes anicteric   GI: Abd soft  CV: RRR  Lungs: breathing unlabored   Sensation: intact to light touch in all dermatomes tested from L2-S1 bilaterally  Reflexes: 2+ b/l patella and achilles  Tone: normal in the b/l knees and hips   Skin: intact  Extremities: No edema in b/l ankles or hands  Provacative tests:        Right Left   L2/3 Iliacus Hip flexion  5  5   L3/4 Qudratus Femoris Knee Extension  5  5   L4/5 Tib Anterior Ankle Dorsiflexion   5  5   L5/S1 Extensor Hallicus Longus Great toe extension  5  5                 S1/S2 Gastroc/Soleus Plantar Flexion  5  5     Sensation: intact to lt touch bilaterally in c4-t1   Reflexes: 0 b/l Bicep, 2+ b/l tricep, Dodge negative  ROM: Cervical ROM full, shoulder, elbow and wrist ROM full  Tone:  Normal at elbow, wrist and shoulder   Inspection: no " atrophy of bicep, FDI or APB noted  Special tests:  Spurling's negative  Palpation: tender cervical paraspinals, levator scapula and trapezius    Motor:    Right Left   C4 Shoulder Abduction  5  5   C5 Elbow Flexion    5  5   C6 Wrist Extension  5  5   C7 Elbow Extension   5  5   C8/T1 Hand Intrinsics   5  5                   Imaging:  MRI lumbar spine 11/9/21  FINDINGS:  The lumbar spine demonstrates proper alignment. The vertebral bodies show normal signal intensity and height with no indication of acute fracture or pathologic marrow replacement process.  Multilevel disc desiccation most prominent L5-S1.     The demonstrated portion of the spinal cord is normal in signal intensity at all levels with no indication of myelomalacia or cord edema. Conus terminates at L1.  Evaluation of the surrounding soft tissue structures demonstrates no acute abnormality.     Degenerative findings:     T12-L1: No significant spinal canal stenosis or neural foraminal narrowing.  L1-L2: No significant spinal canal stenosis or neural foraminal narrowing.  L2-L3: No significant spinal canal stenosis or neural foraminal narrowing.  L3-L4: Mild right facet arthropathy.  No canal or foraminal stenosis.  L4-L5: Disc bulge with central annular fissure.  Bilateral facet arthropathy.  No canal stenosis.  Mild bilateral foraminal stenosis.  L5-S1: Disc bulge with central extrusion and cranial migration.  Mild canal stenosis.  Moderate bilateral foraminal stenosis.  Mild edema in the posterior aspect of the L5 inferior endplate.    MRI lumbar spine 8/13/22  FINDINGS:  The marrow signal is within normal limits.  The conus lies at L1 vertebral body level.  L1/2: There are no significant degenerative changes at this level.     L2/3: There are no significant degenerative changes at this level.  L3/4: There are no significant degenerative changes at this level.  L4/5: There is a posteriorly oriented disc protrusion with small annular tear.  L5/S1:  There is a posteriorly oriented disc extrusion with moderate narrowing of bilateral neural foramina.    Labs:  BMP  Lab Results   Component Value Date     02/14/2022    K 5.1 02/14/2022     02/14/2022    CO2 27 02/14/2022    BUN 26 (H) 02/14/2022    CREATININE 1.1 02/14/2022    CALCIUM 10.3 02/14/2022    ANIONGAP 7 (L) 02/14/2022    ESTGFRAFRICA >60 02/14/2022    EGFRNONAA >60 02/14/2022     Lab Results   Component Value Date    ALT 47 (H) 06/29/2022    AST 43 (H) 06/29/2022    ALKPHOS 66 06/29/2022    BILITOT 1.0 06/29/2022       Assessment:   Problem List Items Addressed This Visit    None  Visit Diagnoses       Lumbar radiculopathy    -  Primary    Relevant Medications    traMADoL (ULTRAM) 50 mg tablet    Other Relevant Orders    Case Request Operating Room: Injection,steroid,epidural,transforaminal approach (Completed)    Dorsalgia, unspecified                42 y.o. year old male who presents with back pain.     6/19/23 - Mr. Bae returns to the office for follow-up.  Today he reports worsening back pain that happened over the past few days.  He reports he was moving into a new house and was lifting boxes when the pain worsened.  He can get radiating pain down the left greater than right buttocks.   He is not having any new numbness or weakness in the lower extremities.  He does report over the past few months he has been waking up with some numbness into the 3rd and 4th digit left greater than right of his hands.    -  on exam he has full strength in his upper and lower extremities.  Intact sensation to light touch bilateral C4-T1, L2-S1.  2+ bilateral triceps, absent bilateral biceps, 2+ bilateral patellar, absent bilateral Achilles DTR.  Luisito's negative.  He has reproducible tingling into the 4th digit bilaterally with Tinel's in the ulnar groove bilaterally.  Spurling's negative  -  I independently reviewed his lumbar MRI and it is consistent with L5-S1 posteriorly oriented disc extrusion  with moderate narrowing of bilateral neural foramina  -  he is status post bilateral L5-S1 TFESI on 08/16/2022 the over 90% relief lasting for over 8 months  -  over the past 8 weeks has been maintaining PT directed home exercise program and taking NSAIDs however her his current pain is limiting his mobility and interfering with his daily living such as work and doing activities around the house  -  we will schedule for a repeat bilateral L5-S1 TFESI.  The risks and benefits of this intervention, and alternative therapies were discussed with the patient.  The discussion of risks included infection, bleeding, need for additional procedures or surgery, nerve damage.  Questions regarding the procedure, risks, expected outcome, and possible side effects were solicited and answered to the patient's satisfaction.  Jorge Bae wishes to proceed with the injection or procedure.  Written consent was obtained.  -  since his pain is 8/10 right now he is going to start him Medrol Dosepak and I have also prescribed him some tramadol to use 2 to 3 times a day as needed for severe breakthrough pain.  He also we will continue to take ibuprofen on an as needed basis.  - follow-up 2-3 weeks post injection.  If he fails to get relief then I am going to get an updated lumbar MRI.    -  regarding  the numbness in his hands when he wakes up I have instructed him to wear elbow brace while he sleeps.  If this does not improve then we will consider EMG/ NCS    : Not applicable    Reno Yu M.D.  Interventional Pain Medicine / Anesthesiology    This note was completed with dictation software and grammatical errors may exist.

## 2023-06-19 NOTE — H&P (VIEW-ONLY)
[facility] Follow Up Evaluation      CC:   Chief Complaint   Patient presents with    Low-back Pain     Left sided        Last 3 PDI Scores 7/20/2022 10/28/2021   Pain Disability Index (PDI) 40 19       Interval HPI 6/19/23: Mr. Bae returns to the office for follow-up.  Today he reports worsening back pain that happened over the past few days.  He reports he was moving into a new house and was lifting boxes when the pain worsened.  He can get radiating pain down the left greater than right buttocks.   He is not having any new numbness or weakness in the lower extremities.  He does report over the past few months he has been waking up with some numbness into the 3rd and 4th digit left greater than right of his hands.    Pain intervention history:   - s/p bilateral L4/5 and L5/S1 facet joint injections on 11/3/21  - s/p L5-S1 on 07/22/2022 and reports 85-90% relief   - s/p bilateral L5-S1 TFESI on 08/16/2022 with over 90% relief    HPI:   Jorge Bae III is a 42 y.o. male who presents with back pain.  He has had chronic lower back pain for the past few years however he reports that a couple days ago he was playing golf and experienced sharp, sudden severe lower back pain.  Today he reports his pain is 8/10, aching, sharp with radiation into his bilateral buttocks and down the back of his his thighs.  He denies any numbness or weakness.  His pain is worse with standing and walking and relieved with sitting and lying down.    History:    Current Outpatient Medications:     atomoxetine (STRATTERA) 25 MG capsule, Take 25 mg by mouth 2 (two) times daily., Disp: , Rfl:     baclofen (LIORESAL) 20 MG tablet, Take 20 mg by mouth 3 (three) times daily., Disp: , Rfl:     buPROPion (WELLBUTRIN XL) 300 MG 24 hr tablet, Take 300 mg by mouth once daily., Disp: , Rfl:     celecoxib (CELEBREX) 200 MG capsule, Take 200 mg by mouth daily as needed., Disp: , Rfl:     ondansetron (ZOFRAN-ODT) 4 MG TbDL, Take 4 mg by  mouth every 6 (six) hours as needed., Disp: , Rfl:     testosterone cypionate (DEPOTESTOTERONE CYPIONATE) 200 mg/mL injection, Inject 150 mg into the muscle every 7 days., Disp: , Rfl:     tiZANidine (ZANAFLEX) 4 MG tablet, Take 1 tablet (4 mg total) by mouth nightly as needed., Disp: 90 tablet, Rfl: 2    traMADoL (ULTRAM) 50 mg tablet, Take 1 tablet (50 mg total) by mouth every 8 (eight) hours as needed for Pain., Disp: 21 tablet, Rfl: 0    Past Medical History:   Diagnosis Date    Depression     Hematuria     Testosterone deficiency        Past Surgical History:   Procedure Laterality Date    deviated septum repair      EPIDURAL STEROID INJECTION INTO LUMBAR SPINE N/A 7/22/2022    Procedure: Injection-steroid-epidural-lumbar L5/S1;  Surgeon: Reno Yu MD;  Location: [facility] OR;  Service: Pain Management;  Laterality: N/A;    ESOPHAGOGASTRODUODENOSCOPY N/A 11/11/2020    Procedure: EGD (ESOPHAGOGASTRODUODENOSCOPY);  Surgeon: Yunior Stevens MD;  Location: [facility];  Service: Endoscopy;  Laterality: N/A;    INJECTION OF FACET JOINT Bilateral 11/3/2021    Procedure: INJECTION, FACET JOINT BILATERAL L4/5, L5/S1;  Surgeon: Mateus Vargas MD;  Location: [facility];  Service: Pain Management;  Laterality: Bilateral;    TRANSFORAMINAL EPIDURAL INJECTION OF STEROID Bilateral 8/16/2022    Procedure: Injection,steroid,epidural,transforaminal approach L5/S1;  Surgeon: Reno Yu MD;  Location: [facility] OR;  Service: Pain Management;  Laterality: Bilateral;       Family History   Problem Relation Age of Onset    Alzheimer's disease Mother     Early death Mother     No Known Problems Father     Heart disease Maternal Grandfather        Social History     Socioeconomic History    Marital status:    Tobacco Use    Smoking status: Never    Smokeless tobacco: Never   Substance and Sexual Activity    Alcohol use: Not Currently     Comment: stopped in 2021, 4-6 times per week, 1/2 wine bottle per night    Drug use:  "Not Currently     Types: Amphetamines    Sexual activity: Yes     Partners: Female     Birth control/protection: None     Comment: partner has Mirena   Social History Narrative    January Barragan    Zia 16 mos    Works as .       Review of patient's allergies indicates:  No Known Allergies    Review of Systems:  General ROS: negative for - fever  Psychological ROS: negative for - hostility  Hematological and Lymphatic ROS: negative for - bleeding problems  Endocrine ROS: negative for - unexpected weight changes  Respiratory ROS: no cough, shortness of breath, or wheezing  Cardiovascular ROS: no chest pain or dyspnea on exertion  Gastrointestinal ROS: no abdominal pain, change in bowel habits, or black or bloody stools  Musculoskeletal ROS: negative for - muscular weakness  Neurological ROS: negative for - numbness/tingling  Dermatological ROS: negative for rash    Physical Exam:  Vitals:    06/19/23 1454   Weight: 82.6 kg (182 lb 3.4 oz)   Height: 5' 9" (1.753 m)   PainSc:   8   PainLoc: Back       Body mass index is 26.91 kg/m².    Gen: NAD  Gait: gait intact  Psych:  Mood appropriate for given condition  HEENT: eyes anicteric   GI: Abd soft  CV: RRR  Lungs: breathing unlabored   Sensation: intact to light touch in all dermatomes tested from L2-S1 bilaterally  Reflexes: 2+ b/l patella and achilles  Tone: normal in the b/l knees and hips   Skin: intact  Extremities: No edema in b/l ankles or hands  Provacative tests:        Right Left   L2/3 Iliacus Hip flexion  5  5   L3/4 Qudratus Femoris Knee Extension  5  5   L4/5 Tib Anterior Ankle Dorsiflexion   5  5   L5/S1 Extensor Hallicus Longus Great toe extension  5  5                 S1/S2 Gastroc/Soleus Plantar Flexion  5  5     Sensation: intact to lt touch bilaterally in c4-t1   Reflexes: 0 b/l Bicep, 2+ b/l tricep, Dodge negative  ROM: Cervical ROM full, shoulder, elbow and wrist ROM full  Tone:  Normal at elbow, wrist and shoulder   Inspection: no " atrophy of bicep, FDI or APB noted  Special tests:  Spurling's negative  Palpation: tender cervical paraspinals, levator scapula and trapezius    Motor:    Right Left   C4 Shoulder Abduction  5  5   C5 Elbow Flexion    5  5   C6 Wrist Extension  5  5   C7 Elbow Extension   5  5   C8/T1 Hand Intrinsics   5  5                   Imaging:  MRI lumbar spine 11/9/21  FINDINGS:  The lumbar spine demonstrates proper alignment. The vertebral bodies show normal signal intensity and height with no indication of acute fracture or pathologic marrow replacement process.  Multilevel disc desiccation most prominent L5-S1.     The demonstrated portion of the spinal cord is normal in signal intensity at all levels with no indication of myelomalacia or cord edema. Conus terminates at L1.  Evaluation of the surrounding soft tissue structures demonstrates no acute abnormality.     Degenerative findings:     T12-L1: No significant spinal canal stenosis or neural foraminal narrowing.  L1-L2: No significant spinal canal stenosis or neural foraminal narrowing.  L2-L3: No significant spinal canal stenosis or neural foraminal narrowing.  L3-L4: Mild right facet arthropathy.  No canal or foraminal stenosis.  L4-L5: Disc bulge with central annular fissure.  Bilateral facet arthropathy.  No canal stenosis.  Mild bilateral foraminal stenosis.  L5-S1: Disc bulge with central extrusion and cranial migration.  Mild canal stenosis.  Moderate bilateral foraminal stenosis.  Mild edema in the posterior aspect of the L5 inferior endplate.    MRI lumbar spine 8/13/22  FINDINGS:  The marrow signal is within normal limits.  The conus lies at L1 vertebral body level.  L1/2: There are no significant degenerative changes at this level.     L2/3: There are no significant degenerative changes at this level.  L3/4: There are no significant degenerative changes at this level.  L4/5: There is a posteriorly oriented disc protrusion with small annular tear.  L5/S1:  There is a posteriorly oriented disc extrusion with moderate narrowing of bilateral neural foramina.    Labs:  BMP  Lab Results   Component Value Date     02/14/2022    K 5.1 02/14/2022     02/14/2022    CO2 27 02/14/2022    BUN 26 (H) 02/14/2022    CREATININE 1.1 02/14/2022    CALCIUM 10.3 02/14/2022    ANIONGAP 7 (L) 02/14/2022    ESTGFRAFRICA >60 02/14/2022    EGFRNONAA >60 02/14/2022     Lab Results   Component Value Date    ALT 47 (H) 06/29/2022    AST 43 (H) 06/29/2022    ALKPHOS 66 06/29/2022    BILITOT 1.0 06/29/2022       Assessment:   Problem List Items Addressed This Visit    None  Visit Diagnoses       Lumbar radiculopathy    -  Primary    Relevant Medications    traMADoL (ULTRAM) 50 mg tablet    Other Relevant Orders    Case Request Operating Room: Injection,steroid,epidural,transforaminal approach (Completed)    Dorsalgia, unspecified                42 y.o. year old male who presents with back pain.     6/19/23 - Mr. Bae returns to the office for follow-up.  Today he reports worsening back pain that happened over the past few days.  He reports he was moving into a new house and was lifting boxes when the pain worsened.  He can get radiating pain down the left greater than right buttocks.   He is not having any new numbness or weakness in the lower extremities.  He does report over the past few months he has been waking up with some numbness into the 3rd and 4th digit left greater than right of his hands.    -  on exam he has full strength in his upper and lower extremities.  Intact sensation to light touch bilateral C4-T1, L2-S1.  2+ bilateral triceps, absent bilateral biceps, 2+ bilateral patellar, absent bilateral Achilles DTR.  Luisito's negative.  He has reproducible tingling into the 4th digit bilaterally with Tinel's in the ulnar groove bilaterally.  Spurling's negative  -  I independently reviewed his lumbar MRI and it is consistent with L5-S1 posteriorly oriented disc extrusion  with moderate narrowing of bilateral neural foramina  -  he is status post bilateral L5-S1 TFESI on 08/16/2022 the over 90% relief lasting for over 8 months  -  over the past 8 weeks has been maintaining PT directed home exercise program and taking NSAIDs however her his current pain is limiting his mobility and interfering with his daily living such as work and doing activities around the house  -  we will schedule for a repeat bilateral L5-S1 TFESI.  The risks and benefits of this intervention, and alternative therapies were discussed with the patient.  The discussion of risks included infection, bleeding, need for additional procedures or surgery, nerve damage.  Questions regarding the procedure, risks, expected outcome, and possible side effects were solicited and answered to the patient's satisfaction.  Jorge Bae wishes to proceed with the injection or procedure.  Written consent was obtained.  -  since his pain is 8/10 right now he is going to start him Medrol Dosepak and I have also prescribed him some tramadol to use 2 to 3 times a day as needed for severe breakthrough pain.  He also we will continue to take ibuprofen on an as needed basis.  - follow-up 2-3 weeks post injection.  If he fails to get relief then I am going to get an updated lumbar MRI.    -  regarding  the numbness in his hands when he wakes up I have instructed him to wear elbow brace while he sleeps.  If this does not improve then we will consider EMG/ NCS    : Not applicable    Reno Yu M.D.  Interventional Pain Medicine / Anesthesiology    This note was completed with dictation software and grammatical errors may exist.

## 2023-07-05 ENCOUNTER — HOSPITAL ENCOUNTER (OUTPATIENT)
Dept: RADIOLOGY | Facility: HOSPITAL | Age: 43
Discharge: HOME | End: 2023-07-05
Attending: ANESTHESIOLOGY | Admitting: ANESTHESIOLOGY
Payer: COMMERCIAL

## 2023-07-05 DIAGNOSIS — M54.50 LOWER BACK PAIN: ICD-10-CM

## 2023-07-07 ENCOUNTER — HOSPITAL ENCOUNTER (OUTPATIENT)
Facility: HOSPITAL | Age: 43
Discharge: HOME | End: 2023-07-07
Attending: ANESTHESIOLOGY | Admitting: ANESTHESIOLOGY
Payer: COMMERCIAL

## 2023-07-07 ENCOUNTER — HOSPITAL ENCOUNTER (OUTPATIENT)
Dept: RADIOLOGY | Facility: HOSPITAL | Age: 43
Discharge: HOME | End: 2023-07-07
Attending: ANESTHESIOLOGY | Admitting: ANESTHESIOLOGY
Payer: COMMERCIAL

## 2023-07-07 VITALS
WEIGHT: 182 LBS | BODY MASS INDEX: 26.96 KG/M2 | TEMPERATURE: 98 F | HEIGHT: 69 IN | RESPIRATION RATE: 13 BRPM | DIASTOLIC BLOOD PRESSURE: 75 MMHG | HEART RATE: 84 BPM | SYSTOLIC BLOOD PRESSURE: 126 MMHG | OXYGEN SATURATION: 95 %

## 2023-07-07 DIAGNOSIS — M54.16 LUMBAR RADICULOPATHY: Primary | ICD-10-CM

## 2023-07-07 DIAGNOSIS — M54.50 LOWER BACK PAIN: ICD-10-CM

## 2023-07-07 PROCEDURE — 64483 NJX AA&/STRD TFRM EPI L/S 1: CPT | Mod: 50,,, | Performed by: ANESTHESIOLOGY

## 2023-07-07 PROCEDURE — 25500020 PHARM REV CODE 255: Mod: PO | Performed by: ANESTHESIOLOGY

## 2023-07-07 PROCEDURE — 63600175 PHARM REV CODE 636 W HCPCS: Mod: PO | Performed by: ANESTHESIOLOGY

## 2023-07-07 PROCEDURE — 76000 FLUOROSCOPY <1 HR PHYS/QHP: CPT | Mod: TC,PO

## 2023-07-07 PROCEDURE — 64483 PR EPIDURAL INJ, ANES/STEROID, TRANSFORAMINAL, LUMB/SACR, SNGL LEVL: ICD-10-PCS | Mod: 50,,, | Performed by: ANESTHESIOLOGY

## 2023-07-07 PROCEDURE — 25000003 PHARM REV CODE 250: Mod: PO | Performed by: ANESTHESIOLOGY

## 2023-07-07 PROCEDURE — 64483 NJX AA&/STRD TFRM EPI L/S 1: CPT | Mod: 50,PO | Performed by: ANESTHESIOLOGY

## 2023-07-07 RX ORDER — SODIUM CHLORIDE, SODIUM LACTATE, POTASSIUM CHLORIDE, CALCIUM CHLORIDE 600; 310; 30; 20 MG/100ML; MG/100ML; MG/100ML; MG/100ML
INJECTION, SOLUTION INTRAVENOUS CONTINUOUS
Status: DISCONTINUED | OUTPATIENT
Start: 2023-07-07 | End: 2023-07-07 | Stop reason: HOSPADM

## 2023-07-07 RX ORDER — MIDAZOLAM HYDROCHLORIDE 2 MG/2ML
INJECTION, SOLUTION INTRAMUSCULAR; INTRAVENOUS
Status: DISCONTINUED | OUTPATIENT
Start: 2023-07-07 | End: 2023-07-07 | Stop reason: HOSPADM

## 2023-07-07 RX ORDER — BUPIVACAINE HYDROCHLORIDE 5 MG/ML
INJECTION, SOLUTION EPIDURAL; INTRACAUDAL
Status: DISCONTINUED | OUTPATIENT
Start: 2023-07-07 | End: 2023-07-07 | Stop reason: HOSPADM

## 2023-07-07 RX ORDER — TRIAMCINOLONE ACETONIDE 40 MG/ML
INJECTION, SUSPENSION INTRA-ARTICULAR; INTRAMUSCULAR
Status: DISCONTINUED | OUTPATIENT
Start: 2023-07-07 | End: 2023-07-07 | Stop reason: HOSPADM

## 2023-07-07 RX ORDER — SODIUM CHLORIDE 0.9 G/100ML
IRRIGANT IRRIGATION
Status: DISCONTINUED | OUTPATIENT
Start: 2023-07-07 | End: 2023-07-07 | Stop reason: HOSPADM

## 2023-07-07 RX ORDER — LIDOCAINE HYDROCHLORIDE 10 MG/ML
INJECTION, SOLUTION EPIDURAL; INFILTRATION; INTRACAUDAL; PERINEURAL
Status: DISCONTINUED | OUTPATIENT
Start: 2023-07-07 | End: 2023-07-07 | Stop reason: HOSPADM

## 2023-07-07 RX ADMIN — SODIUM CHLORIDE, POTASSIUM CHLORIDE, SODIUM LACTATE AND CALCIUM CHLORIDE: 600; 310; 30; 20 INJECTION, SOLUTION INTRAVENOUS at 12:07

## 2023-07-07 NOTE — DISCHARGE SUMMARY
[facility]  Discharge Note  Short Stay    Admit Date: 7/7/2023    Discharge Date: 7/7/2023    Attending Physician: Reno Yu     Discharge Provider: Reno Yu    Diagnoses:  There are no hospital problems to display for this patient.      Discharged Condition: Good    Final Diagnoses: Lumbar radiculopathy [M54.16]    Disposition: Home or Self Care    Hospital Course: No complications, uneventful    Outcome of Hospitalization, Treatment, Procedure, or Surgery:  Patient was admitted for outpatient interventional pain management procedure. The patient tolerated the procedure well with no complications.    Follow up/Patient Instructions:  Follow up as scheduled in Pain Management office in 2-3 weeks.  Patient has received instructions and follow up date and time.    Medications:  Continue previous medications    Discharge Procedure Orders   Notify your health care provider if you experience any of the following:  temperature >100.4     Notify your health care provider if you experience any of the following:  persistent nausea and vomiting or diarrhea     Notify your health care provider if you experience any of the following:  severe uncontrolled pain     Notify your health care provider if you experience any of the following:  redness, tenderness, or signs of infection (pain, swelling, redness, odor or green/yellow discharge around incision site)     Notify your health care provider if you experience any of the following:  difficulty breathing or increased cough     Notify your health care provider if you experience any of the following:  severe persistent headache     Notify your health care provider if you experience any of the following:  worsening rash     Notify your health care provider if you experience any of the following:  persistent dizziness, light-headedness, or visual disturbances     Notify your health care provider if you experience any of the following:  increased confusion or  weakness     Activity as tolerated

## 2023-07-07 NOTE — OP NOTE
Procedure Note    Procedure Date: 7/7/2023    Procedure Performed: bilateral Transforaminal Epidural @ L5/S1, with Fluoroscopic Guidance    Indications: Jorge Bae III presents with lumbar radiculitis/radiculopathy secondary to disc herniation, osteophyte/osteophyte complexes, and/or severe degenerative disc disease producing foraminal or central spinal stenosis.  The pain has been present for at least 4 weeks and the patient has failed to respond to noninvasive conservative care.  Pain rated by NRS at baseline prior to intervention is 6/10.  Their radiculitis/radiculopathy and/or neurogenic claudication is severe enough to greatly impact their quality of life or function.     Pre-op diagnosis: Lumbar Radiculitis/Radiculopathy    Post-op diagnosis: same    Physician: Reno Yu MD    IV anxiolysis medications: versed 4mg    Medications injected: 0.25% Bupivacaine 2ml, kenalog 40mg, 3 mL sterile, preservative-free normal saline    Local anesthetic used: 1% Lidocaine 2 ml    Estimated Blood Loss: none    Complications:  none    Technique:  The patient was interviewed in the holding area and Risks/Benefits were discussed, including, but not limited to, the possibility of new or different pain, bleeding or infection.   All questions were answered.  The patient understood and accepted risks.  Consent was reviewed.  A time out was taken to identify the patient, procedure and side of the procedure. The patient was placed in a prone position, then prepped and draped in the usual sterile fashion using ChloraPrep x2 and sterile towels.  The Bilateral L5 neural foramena were identified under fluoroscopic guidance in AP and oblique view.  Local anesthetic was given by raising a wheal and going down to the hub of a 25-gauge 1.5 inch needle.  In oblique view, a 3.5 inch 22-gauge bent-tip spinal needle was introduced into the foramen @ L5 and positioned in the posterior superior quarter of the foramen.  .5cc of  Omnipaque contrast was injected live in an AP fluoroscopic view at each level demonstrating appropriate needle position with contrast spread outlining the respective nerve root and also medially into the epidural space without intravascular or intrathecal spread.  Then, after negative aspiration, a mixture of 0.25% Bupivacaine 2ml, kenalog 40mg, 3 mL sterile, preservative-free normal saline was divided evenly and injected slowly and incrementally.  The needle(s) was(were) flushed with normal saline and removed.  The patient tolerated the procedure well.  The patient was monitored after the procedure.  Patient was given post procedure and discharge instructions to follow at home. The patient was discharged in a stable condition.

## 2023-07-20 ENCOUNTER — PATIENT MESSAGE (OUTPATIENT)
Dept: OTOLARYNGOLOGY | Facility: CLINIC | Age: 43
End: 2023-07-20
Payer: COMMERCIAL

## 2023-07-20 DIAGNOSIS — G47.33 OSA (OBSTRUCTIVE SLEEP APNEA): Primary | ICD-10-CM

## 2023-07-20 DIAGNOSIS — R06.83 SNORING: ICD-10-CM

## 2023-07-27 ENCOUNTER — PATIENT MESSAGE (OUTPATIENT)
Dept: OTOLARYNGOLOGY | Facility: CLINIC | Age: 43
End: 2023-07-27
Payer: COMMERCIAL

## 2023-07-31 ENCOUNTER — TELEPHONE (OUTPATIENT)
Dept: OTOLARYNGOLOGY | Facility: CLINIC | Age: 43
End: 2023-07-31
Payer: COMMERCIAL

## 2023-07-31 NOTE — TELEPHONE ENCOUNTER
W pt and advised that the orders for his CPAP machine have been faxed to [facility]. Advised pt that if he does not get a call from them in 2-3 days for him to call them, number provided to pt, pt verbalized understanding.

## 2023-07-31 NOTE — TELEPHONE ENCOUNTER
----- Message from Perri Washington sent at 7/31/2023  9:21 AM CDT -----  Contact: Pt  Type:  Needs Medical Advice    Who Called: Pt  Would the patient rather a call back or a response via [facility]? call  Best Call Back Number: [phone]  Additional Information: Pt was told to  a CPAP machine, and wants to know where to pick it up. He also wants to know the next steps. Please call pt back to advise.

## 2023-08-15 RX ORDER — METHOCARBAMOL 750 MG/1
750 TABLET, FILM COATED ORAL 4 TIMES DAILY PRN
Qty: 44 TABLET | Refills: 3 | Status: SHIPPED | OUTPATIENT
Start: 2023-08-15 | End: 2023-10-23

## 2023-09-19 ENCOUNTER — HOSPITAL ENCOUNTER (OUTPATIENT)
Dept: RADIOLOGY | Facility: HOSPITAL | Age: 43
Discharge: HOME | End: 2023-09-19
Attending: ORTHOPAEDIC SURGERY
Payer: COMMERCIAL

## 2023-09-19 ENCOUNTER — OFFICE VISIT (OUTPATIENT)
Dept: ORTHOPEDICS | Facility: CLINIC | Age: 43
End: 2023-09-19
Payer: COMMERCIAL

## 2023-09-19 DIAGNOSIS — M25.512 ACUTE PAIN OF LEFT SHOULDER: ICD-10-CM

## 2023-09-19 DIAGNOSIS — M25.512 ACUTE PAIN OF LEFT SHOULDER: Primary | ICD-10-CM

## 2023-09-19 DIAGNOSIS — M75.42 IMPINGEMENT SYNDROME OF LEFT SHOULDER: Primary | ICD-10-CM

## 2023-09-19 PROCEDURE — 73030 XR SHOULDER COMPLETE 2 OR MORE VIEWS LEFT: ICD-10-PCS | Mod: 26,LT,, | Performed by: RADIOLOGY

## 2023-09-19 PROCEDURE — 1160F PR REVIEW ALL MEDS BY PRESCRIBER/CLIN PHARMACIST DOCUMENTED: ICD-10-PCS | Mod: CPTII,S$GLB,, | Performed by: ORTHOPAEDIC SURGERY

## 2023-09-19 PROCEDURE — 99213 PR OFFICE/OUTPT VISIT, EST, LEVL III, 20-29 MIN: ICD-10-PCS | Mod: 25,S$GLB,, | Performed by: ORTHOPAEDIC SURGERY

## 2023-09-19 PROCEDURE — 20610 DRAIN/INJ JOINT/BURSA W/O US: CPT | Mod: LT,S$GLB,, | Performed by: ORTHOPAEDIC SURGERY

## 2023-09-19 PROCEDURE — 1160F RVW MEDS BY RX/DR IN RCRD: CPT | Mod: CPTII,S$GLB,, | Performed by: ORTHOPAEDIC SURGERY

## 2023-09-19 PROCEDURE — 73030 X-RAY EXAM OF SHOULDER: CPT | Mod: TC,PO,LT

## 2023-09-19 PROCEDURE — 1159F MED LIST DOCD IN RCRD: CPT | Mod: CPTII,S$GLB,, | Performed by: ORTHOPAEDIC SURGERY

## 2023-09-19 PROCEDURE — 99999 PR PBB SHADOW E&M-EST. PATIENT-LVL II: ICD-10-PCS | Mod: PBBFAC,,, | Performed by: ORTHOPAEDIC SURGERY

## 2023-09-19 PROCEDURE — 20610 LARGE JOINT ASPIRATION/INJECTION: L SUBACROMIAL BURSA: ICD-10-PCS | Mod: LT,S$GLB,, | Performed by: ORTHOPAEDIC SURGERY

## 2023-09-19 PROCEDURE — 1159F PR MEDICATION LIST DOCUMENTED IN MEDICAL RECORD: ICD-10-PCS | Mod: CPTII,S$GLB,, | Performed by: ORTHOPAEDIC SURGERY

## 2023-09-19 PROCEDURE — 99213 OFFICE O/P EST LOW 20 MIN: CPT | Mod: 25,S$GLB,, | Performed by: ORTHOPAEDIC SURGERY

## 2023-09-19 PROCEDURE — 73030 X-RAY EXAM OF SHOULDER: CPT | Mod: 26,LT,, | Performed by: RADIOLOGY

## 2023-09-19 PROCEDURE — 99999 PR PBB SHADOW E&M-EST. PATIENT-LVL II: CPT | Mod: PBBFAC,,, | Performed by: ORTHOPAEDIC SURGERY

## 2023-09-19 RX ADMIN — TRIAMCINOLONE ACETONIDE 40 MG: 40 INJECTION, SUSPENSION INTRA-ARTICULAR; INTRAMUSCULAR at 01:09

## 2023-09-22 RX ORDER — TRIAMCINOLONE ACETONIDE 40 MG/ML
40 INJECTION, SUSPENSION INTRA-ARTICULAR; INTRAMUSCULAR
Status: DISCONTINUED | OUTPATIENT
Start: 2023-09-19 | End: 2023-09-22 | Stop reason: HOSPADM

## 2023-09-22 NOTE — PROGRESS NOTES
Chief Complaint   Patient presents with    Left Shoulder - Pain       HPI:    This is a 43 y.o. who presents today complaining of left shoulder pain for 2 months after sports injury. Pain is dull. No numbness or tingling. No associated signs or symptoms.      Past Medical History:   Diagnosis Date    Depression     Hematuria     Testosterone deficiency       Past Surgical History:   Procedure Laterality Date    deviated septum repair      EPIDURAL STEROID INJECTION INTO LUMBAR SPINE N/A 7/22/2022    Procedure: Injection-steroid-epidural-lumbar L5/S1;  Surgeon: Reno Yu MD;  Location: [facility] OR;  Service: Pain Management;  Laterality: N/A;    ESOPHAGOGASTRODUODENOSCOPY N/A 11/11/2020    Procedure: EGD (ESOPHAGOGASTRODUODENOSCOPY);  Surgeon: Yunior Stevens MD;  Location: [facility];  Service: Endoscopy;  Laterality: N/A;    INJECTION OF FACET JOINT Bilateral 11/3/2021    Procedure: INJECTION, FACET JOINT BILATERAL L4/5, L5/S1;  Surgeon: Mateus Vargas MD;  Location: [facility];  Service: Pain Management;  Laterality: Bilateral;    TRANSFORAMINAL EPIDURAL INJECTION OF STEROID Bilateral 8/16/2022    Procedure: Injection,steroid,epidural,transforaminal approach L5/S1;  Surgeon: Reno Yu MD;  Location: [facility] OR;  Service: Pain Management;  Laterality: Bilateral;    TRANSFORAMINAL EPIDURAL INJECTION OF STEROID Bilateral 7/7/2023    Procedure: Injection,steroid,epidural,transforaminal approach;  Surgeon: Reno Yu MD;  Location: [facility] OR;  Service: Pain Management;  Laterality: Bilateral;  L5/S1      Current Outpatient Medications on File Prior to Visit   Medication Sig Dispense Refill    atomoxetine (STRATTERA) 25 MG capsule Take 25 mg by mouth 2 (two) times daily.      baclofen (LIORESAL) 20 MG tablet Take 20 mg by mouth 3 (three) times daily.      buPROPion (WELLBUTRIN XL) 300 MG 24 hr tablet Take 300 mg by mouth once daily.      celecoxib (CELEBREX) 200 MG capsule Take 200 mg by mouth daily as  needed.      methocarbamoL (ROBAXIN) 750 MG Tab Take 1 tablet (750 mg total) by mouth 4 (four) times daily as needed. 44 tablet 3    ondansetron (ZOFRAN-ODT) 4 MG TbDL Take 4 mg by mouth every 6 (six) hours as needed.      testosterone cypionate (DEPOTESTOTERONE CYPIONATE) 200 mg/mL injection Inject 150 mg into the muscle every 7 days.      tiZANidine (ZANAFLEX) 4 MG tablet Take 1 tablet (4 mg total) by mouth nightly as needed. 90 tablet 2    traMADoL (ULTRAM) 50 mg tablet Take 1 tablet (50 mg total) by mouth every 8 (eight) hours as needed for Pain. 21 tablet 0     No current facility-administered medications on file prior to visit.      Review of patient's allergies indicates:  No Known Allergies   Family History not pertinent   Social History     Socioeconomic History    Marital status:    Tobacco Use    Smoking status: Never    Smokeless tobacco: Never   Substance and Sexual Activity    Alcohol use: Not Currently     Comment: stopped in 2021, 4-6 times per week, 1/2 wine bottle per night    Drug use: Not Currently     Types: Amphetamines    Sexual activity: Yes     Partners: Female     Birth control/protection: None     Comment: partner has Mirena   Social History Narrative    January 4    Jack 16 mos    Works as .     Social Determinants of Health     Financial Resource Strain: Low Risk  (2/4/2022)    Overall Financial Resource Strain (CARDIA)     Difficulty of Paying Living Expenses: Not hard at all   Food Insecurity: No Food Insecurity (2/4/2022)    Hunger Vital Sign     Worried About Running Out of Food in the Last Year: Never true     Ran Out of Food in the Last Year: Never true   Transportation Needs: No Transportation Needs (2/4/2022)    PRAPARE - Transportation     Lack of Transportation (Medical): No     Lack of Transportation (Non-Medical): No   Physical Activity: Sufficiently Active (2/4/2022)    Exercise Vital Sign     Days of Exercise per Week: 7 days     Minutes of Exercise per  Session: 60 min   Stress: Stress Concern Present (2/4/2022)    [facility] of Occupational Health - Occupational Stress Questionnaire     Feeling of Stress : To some extent   Social Connections: Unknown (2/4/2022)    Social Connection and Isolation Panel [NHANES]     Frequency of Communication with Friends and Family: Three times a week     Frequency of Social Gatherings with Friends and Family: Once a week     Active Member of Clubs or Organizations: No     Attends Club or Organization Meetings: More than 4 times per year     Marital Status:    Housing Stability: Low Risk  (2/4/2022)    Housing Stability Vital Sign     Unable to Pay for Housing in the Last Year: No     Number of Places Lived in the Last Year: 1     Unstable Housing in the Last Year: No         Review of Systems:   Constitutional:  Denies fever or chills    Eyes:  Denies change in visual acuity    HENT:  Denies nasal congestion or sore throat    Respiratory:  Denies cough or shortness of breath    Cardiovascular:  Denies chest pain or edema    GI:  Denies abdominal pain, nausea, vomiting, bloody stools or diarrhea    :  Denies dysuria    Integument:  Denies rash    Neurologic:  Denies headache, focal weakness or sensory changes    Endocrine:  Denies polyuria or polydipsia    Lymphatic:  Denies swollen glands    Psychiatric:  Denies depression or anxiety       Physical Exam:    Constitutional:  Well developed, well nourished, no acute distress, non-toxic appearance    Integument:  Well hydrated, no rash    Lymphatic:  No lymphadenopathy noted    Neurologic:  Alert & oriented x 3,     Psychiatric:  Speech and behavior appropriate    Gi: abdomen soft  Eyes: EOMI     Bilateral Shoulder Exam    right Shoulder Exam   Shoulder exam performed same as contralateral side and is normal.    left Shoulder Exam   Tenderness   Shoulder tenderness location: diffusely about shoulder.    Range of Motion   Forward Flexion: abnormal   External Rotation:  abnormal     Muscle Strength   Supraspinatus: 4/5     Tests   Hawkin's test: positive  Impingement: positive    Other   Erythema: absent  Sensation: normal  Pulse: present      X-rays were performed today, personally reviewed by me and findings discussed with the patient.   3 views of the left shoulder show no fractures or dislocations      Impingement syndrome of left shoulder          Using an aseptic technique, I injected 5 cc of lidocaine 1% without and 1 cc of kenalog 40mg into the left Shoulder. The patient tolerated this well. I will have them return to clinic as needed.

## 2023-09-22 NOTE — PROCEDURES
Large Joint Aspiration/Injection: L subacromial bursa    Date/Time: 9/19/2023 1:30 PM    Performed by: Douglas Steinberg MD  Authorized by: Douglas Steinberg MD    Consent Done?:  Yes (Verbal)  Indications:  Pain  Timeout: prior to procedure the correct patient, procedure, and site was verified    Prep: patient was prepped and draped in usual sterile fashion      Local anesthesia used?: Yes    Local anesthetic:  Lidocaine 1% without epinephrine  Anesthetic total (ml):  5      Details:  Needle Size:  21 G  Ultrasonic Guidance for needle placement?: No    Approach:  Posterior  Location:  Shoulder  Site:  L subacromial bursa  Medications:  40 mg triamcinolone acetonide 40 mg/mL  Patient tolerance:  Patient tolerated the procedure well with no immediate complications

## 2023-09-25 ENCOUNTER — PATIENT MESSAGE (OUTPATIENT)
Dept: OTOLARYNGOLOGY | Facility: CLINIC | Age: 43
End: 2023-09-25
Payer: COMMERCIAL

## 2023-09-25 DIAGNOSIS — G47.33 OSA (OBSTRUCTIVE SLEEP APNEA): Primary | ICD-10-CM

## 2023-10-16 ENCOUNTER — PATIENT MESSAGE (OUTPATIENT)
Dept: PRIMARY CARE CLINIC | Facility: CLINIC | Age: 43
End: 2023-10-16

## 2023-10-16 ENCOUNTER — OFFICE VISIT (OUTPATIENT)
Dept: PRIMARY CARE CLINIC | Facility: CLINIC | Age: 43
End: 2023-10-16
Payer: COMMERCIAL

## 2023-10-16 VITALS
SYSTOLIC BLOOD PRESSURE: 160 MMHG | BODY MASS INDEX: 27.18 KG/M2 | DIASTOLIC BLOOD PRESSURE: 88 MMHG | HEART RATE: 89 BPM | OXYGEN SATURATION: 97 % | WEIGHT: 184.06 LBS

## 2023-10-16 DIAGNOSIS — R74.8 ELEVATED CK: ICD-10-CM

## 2023-10-16 DIAGNOSIS — Z23 NEED FOR VACCINATION: ICD-10-CM

## 2023-10-16 DIAGNOSIS — Z00.00 ANNUAL PHYSICAL EXAM: Primary | ICD-10-CM

## 2023-10-16 DIAGNOSIS — E34.9 TESTOSTERONE DEFICIENCY: ICD-10-CM

## 2023-10-16 DIAGNOSIS — F33.0 MILD RECURRENT MAJOR DEPRESSION: ICD-10-CM

## 2023-10-16 PROCEDURE — 90471 IMMUNIZATION ADMIN: CPT | Mod: S$GLB,,, | Performed by: INTERNAL MEDICINE

## 2023-10-16 PROCEDURE — 1160F RVW MEDS BY RX/DR IN RCRD: CPT | Mod: CPTII,S$GLB,, | Performed by: INTERNAL MEDICINE

## 2023-10-16 PROCEDURE — 3008F PR BODY MASS INDEX (BMI) DOCUMENTED: ICD-10-PCS | Mod: CPTII,S$GLB,, | Performed by: INTERNAL MEDICINE

## 2023-10-16 PROCEDURE — 1160F PR REVIEW ALL MEDS BY PRESCRIBER/CLIN PHARMACIST DOCUMENTED: ICD-10-PCS | Mod: CPTII,S$GLB,, | Performed by: INTERNAL MEDICINE

## 2023-10-16 PROCEDURE — 99999 PR PBB SHADOW E&M-EST. PATIENT-LVL III: CPT | Mod: PBBFAC,,, | Performed by: INTERNAL MEDICINE

## 2023-10-16 PROCEDURE — 3077F SYST BP >= 140 MM HG: CPT | Mod: CPTII,S$GLB,, | Performed by: INTERNAL MEDICINE

## 2023-10-16 PROCEDURE — 99999 PR PBB SHADOW E&M-EST. PATIENT-LVL III: ICD-10-PCS | Mod: PBBFAC,,, | Performed by: INTERNAL MEDICINE

## 2023-10-16 PROCEDURE — 1159F PR MEDICATION LIST DOCUMENTED IN MEDICAL RECORD: ICD-10-PCS | Mod: CPTII,S$GLB,, | Performed by: INTERNAL MEDICINE

## 2023-10-16 PROCEDURE — 3008F BODY MASS INDEX DOCD: CPT | Mod: CPTII,S$GLB,, | Performed by: INTERNAL MEDICINE

## 2023-10-16 PROCEDURE — 90471 FLU VACCINE (QUAD) GREATER THAN OR EQUAL TO 3YO PRESERVATIVE FREE IM: ICD-10-PCS | Mod: S$GLB,,, | Performed by: INTERNAL MEDICINE

## 2023-10-16 PROCEDURE — 90686 IIV4 VACC NO PRSV 0.5 ML IM: CPT | Mod: S$GLB,,, | Performed by: INTERNAL MEDICINE

## 2023-10-16 PROCEDURE — 99396 PREV VISIT EST AGE 40-64: CPT | Mod: 25,S$GLB,, | Performed by: INTERNAL MEDICINE

## 2023-10-16 PROCEDURE — 3079F PR MOST RECENT DIASTOLIC BLOOD PRESSURE 80-89 MM HG: ICD-10-PCS | Mod: CPTII,S$GLB,, | Performed by: INTERNAL MEDICINE

## 2023-10-16 PROCEDURE — 3077F PR MOST RECENT SYSTOLIC BLOOD PRESSURE >= 140 MM HG: ICD-10-PCS | Mod: CPTII,S$GLB,, | Performed by: INTERNAL MEDICINE

## 2023-10-16 PROCEDURE — 99396 PR PREVENTIVE VISIT,EST,40-64: ICD-10-PCS | Mod: 25,S$GLB,, | Performed by: INTERNAL MEDICINE

## 2023-10-16 PROCEDURE — 3079F DIAST BP 80-89 MM HG: CPT | Mod: CPTII,S$GLB,, | Performed by: INTERNAL MEDICINE

## 2023-10-16 PROCEDURE — 90686 FLU VACCINE (QUAD) GREATER THAN OR EQUAL TO 3YO PRESERVATIVE FREE IM: ICD-10-PCS | Mod: S$GLB,,, | Performed by: INTERNAL MEDICINE

## 2023-10-16 PROCEDURE — 1159F MED LIST DOCD IN RCRD: CPT | Mod: CPTII,S$GLB,, | Performed by: INTERNAL MEDICINE

## 2023-10-16 NOTE — PROGRESS NOTES
[facility] Note    Chief Complaint      Chief Complaint   Patient presents with    Annual Exam       History of Present Illness      Jorge Bae III is a 43 y.o. male with chronic conditions of testosterone deficiency, depression, chronic low back pain who presents today for: annual preventative visit.  Has been following with Dr. Steinberg, ortho, for left shoulder pain.  JONNATHAN with Dr. Yu for lumbar radiculopathy 7/2023.  Also being evaluated by ENT for sleep apnea/snoring eval.  Has CPAP machine but not tolerating.    Testosterone deficiency: On testosterone injections.  Sees [facility], Dr. London.  Low back pain: Sees Dr. Vargas.  Tried facet injections which helped for a short while.   Depression: Sees Dr. Bryant, psych.  Previously on adderall.  Doing well on wellbutrin.   Elevated LFTs: Saw hepatology who attributed elevated levels to muscle breakdown (elevated CK on labs at the time as well).  Referred to rheumatology.  Diet: Keto diet.  Drinks plenty water.  Exercise: workouts daily.  P90x insanity.  Weights.  Cardio/peloton.      Denies drinking and driving, drinking more than 4 drinks on occasion, drug use.     Flu shot UTD.  TdAP 2021.  COVID vaccine UTD, discussed booster.  Shingrix due age 52.  Pneumonia vaccine due age 67.  PSA and cscope due age 47.         Past Medical History:  Past Medical History:   Diagnosis Date    Depression     Hematuria     Testosterone deficiency        Past Surgical History:   has a past surgical history that includes deviated septum repair; Esophagogastroduodenoscopy (N/A, 11/11/2020); Injection of facet joint (Bilateral, 11/3/2021); Epidural steroid injection into lumbar spine (N/A, 7/22/2022); Transforaminal epidural injection of steroid (Bilateral, 8/16/2022); and Transforaminal epidural injection of steroid (Bilateral, 7/7/2023).    Family History:  family history includes Alzheimer's disease in his mother; Early death in his mother; Heart  disease in his maternal grandfather; No Known Problems in his father.     Social History:  Social History     Tobacco Use    Smoking status: Never    Smokeless tobacco: Never   Substance Use Topics    Alcohol use: Yes     Alcohol/week: 10.0 standard drinks of alcohol     Types: 10 Glasses of wine per week     Comment: stopped in 2021, 4-6 times per week, 1/2 wine bottle per night    Drug use: Not Currently     Types: Amphetamines       I personally reviewed all past medical, surgical, social and family history.    Review of Systems   Constitutional:  Negative for chills, fever and malaise/fatigue.   Respiratory:  Negative for shortness of breath.    Cardiovascular:  Negative for chest pain.   Gastrointestinal:  Negative for constipation, diarrhea, nausea and vomiting.   Skin:  Negative for rash.   Neurological:  Negative for weakness.   All other systems reviewed and are negative.       Medications:  Outpatient Encounter Medications as of 10/16/2023   Medication Sig Note Dispense Refill    atomoxetine (STRATTERA) 25 MG capsule Take 25 mg by mouth 2 (two) times daily.       baclofen (LIORESAL) 20 MG tablet Take 20 mg by mouth 3 (three) times daily.       buPROPion (WELLBUTRIN XL) 300 MG 24 hr tablet Take 300 mg by mouth once daily.       celecoxib (CELEBREX) 200 MG capsule Take 200 mg by mouth daily as needed. 6/29/2023: Pt states he knows to hold 2-3 days prior to procedure on 7/5.      methocarbamoL (ROBAXIN) 750 MG Tab Take 1 tablet (750 mg total) by mouth 4 (four) times daily as needed.  44 tablet 3    ondansetron (ZOFRAN-ODT) 4 MG TbDL Take 4 mg by mouth every 6 (six) hours as needed.       testosterone cypionate (DEPOTESTOTERONE CYPIONATE) 200 mg/mL injection Inject 150 mg into the muscle every 7 days.       tiZANidine (ZANAFLEX) 4 MG tablet Take 1 tablet (4 mg total) by mouth nightly as needed.  90 tablet 2    [DISCONTINUED] traMADoL (ULTRAM) 50 mg tablet Take 1 tablet (50 mg total) by mouth every 8 (eight)  hours as needed for Pain.  21 tablet 0     No facility-administered encounter medications on file as of 10/16/2023.       Allergies:  Review of patient's allergies indicates:  No Known Allergies    Health Maintenance:  Immunization History   Administered Date(s) Administered    COVID-19, MRNA, LN-S, PF (MODERNA FULL 0.5 ML DOSE) 03/01/2021, 03/29/2021    Hepatitis A, Adult 06/07/2022    Influenza - Quadrivalent - MDCK - PF 10/27/2020    Influenza - Quadrivalent - PF *Preferred* (6 months and older) 09/22/2015, 09/27/2016, 10/08/2017, 08/29/2018, 09/27/2019, 10/16/2023    Tdap 08/29/2018, 09/10/2021      Health Maintenance   Topic Date Due    Lipid Panel  02/14/2027    TETANUS VACCINE  09/10/2031    Hepatitis C Screening  Completed        Physical Exam      Vital Signs  Pulse: 89  SpO2: 97 %  BP: (!) 160/88  BP Location: Right arm  Pain Score:   5  Pain Loc: Back  Height and Weight  Weight: 83.5 kg (184 lb 1.4 oz)]    Physical Exam  Vitals reviewed.   Constitutional:       Appearance: He is well-developed.   HENT:      Head: Normocephalic and atraumatic.      Right Ear: External ear normal.      Left Ear: External ear normal.   Cardiovascular:      Rate and Rhythm: Normal rate and regular rhythm.      Heart sounds: Normal heart sounds. No murmur heard.  Pulmonary:      Effort: Pulmonary effort is normal.      Breath sounds: Normal breath sounds. No wheezing or rales.   Abdominal:      General: Bowel sounds are normal.      Palpations: Abdomen is soft.          Laboratory:  CBC:  Recent Labs   Lab 11/20/20  0812 02/14/22  1052   WBC 6.46 7.83   RBC 5.42 5.40   Hemoglobin 16.3 16.6   Hematocrit 52.5 50.2   Platelets 272 264   MCV 97 93   MCH 30.1 30.7   MCHC 31.0 L 33.1     CMP:  Recent Labs   Lab 02/14/22  1052 04/26/22  1007 06/29/22  0846   Glucose 94  --   --    Calcium 10.3  --   --    Albumin 4.4 4.3 4.3   Total Protein 7.4 7.6 7.3   Sodium 140  --   --    Potassium 5.1  --   --    CO2 27  --   --    Chloride 106   --   --    BUN 26 H  --   --    Alkaline Phosphatase 75 68 66   ALT 50 H 36 47 H   AST 42 H 37 43 H   Total Bilirubin 0.6 1.1 H 1.0     URINALYSIS:       LIPIDS:  Recent Labs   Lab 11/20/20  0812 02/14/22  1052   TSH 1.978 2.278   HDL  --  49   Cholesterol  --  173   Triglycerides  --  36   LDL Cholesterol  --  116.8   HDL/Cholesterol Ratio  --  28.3   Non-HDL Cholesterol  --  124   Total Cholesterol/HDL Ratio  --  3.5     TSH:  Recent Labs   Lab 11/20/20  0812 02/14/22  1052   TSH 1.978 2.278     A1C:        Assessment/Plan     Jorge Bae III is a 43 y.o.male with:    1. Annual physical exam  - CBC Auto Differential; Future  - Comprehensive Metabolic Panel; Future  - Lipid Panel; Future  - TSH; Future  - CK; Future  - TESTOSTERONE; Future  Discussed diet and exercise, vaccines and cancer screening, risk factors.  Screening labs ordered.     2. Testosterone deficiency  Continue current meds.    3. Mild recurrent major depression  Continue current meds.    4. Elevated CK  Check labs.  5. Need for vaccination  - Influenza - Quadrivalent (PF)       Chronic conditions status updated as per HPI.  Other than changes above, cont current medications and maintain follow up with specialists.  No follow-ups on file.    Future Appointments   Date Time Provider Department Center   10/17/2023 12:40 PM LAB, [place] [facility] LAB [place] Crowley MD  [facility]

## 2023-10-17 ENCOUNTER — LAB VISIT (OUTPATIENT)
Dept: LAB | Facility: HOSPITAL | Age: 43
End: 2023-10-17
Attending: INTERNAL MEDICINE
Payer: COMMERCIAL

## 2023-10-17 DIAGNOSIS — Z00.00 ANNUAL PHYSICAL EXAM: ICD-10-CM

## 2023-10-17 LAB
ALBUMIN SERPL BCP-MCNC: 4.5 G/DL (ref 3.5–5.2)
ALP SERPL-CCNC: 57 U/L (ref 55–135)
ALT SERPL W/O P-5'-P-CCNC: 40 U/L (ref 10–44)
ANION GAP SERPL CALC-SCNC: 14 MMOL/L (ref 8–16)
AST SERPL-CCNC: 40 U/L (ref 10–40)
BASOPHILS # BLD AUTO: 0.04 K/UL (ref 0–0.2)
BASOPHILS NFR BLD: 0.6 % (ref 0–1.9)
BILIRUB SERPL-MCNC: 0.7 MG/DL (ref 0.1–1)
BUN SERPL-MCNC: 17 MG/DL (ref 6–20)
CALCIUM SERPL-MCNC: 10.4 MG/DL (ref 8.7–10.5)
CHLORIDE SERPL-SCNC: 99 MMOL/L (ref 95–110)
CHOLEST SERPL-MCNC: 212 MG/DL (ref 120–199)
CHOLEST/HDLC SERPL: 4.1 {RATIO} (ref 2–5)
CK SERPL-CCNC: 667 U/L (ref 20–200)
CO2 SERPL-SCNC: 24 MMOL/L (ref 23–29)
CREAT SERPL-MCNC: 0.8 MG/DL (ref 0.5–1.4)
DIFFERENTIAL METHOD: ABNORMAL
EOSINOPHIL # BLD AUTO: 0.1 K/UL (ref 0–0.5)
EOSINOPHIL NFR BLD: 1.3 % (ref 0–8)
ERYTHROCYTE [DISTWIDTH] IN BLOOD BY AUTOMATED COUNT: 13.3 % (ref 11.5–14.5)
EST. GFR  (NO RACE VARIABLE): >60 ML/MIN/1.73 M^2
GLUCOSE SERPL-MCNC: 92 MG/DL (ref 70–110)
HCT VFR BLD AUTO: 48.8 % (ref 40–54)
HDLC SERPL-MCNC: 52 MG/DL (ref 40–75)
HDLC SERPL: 24.5 % (ref 20–50)
HGB BLD-MCNC: 16.3 G/DL (ref 14–18)
IMM GRANULOCYTES # BLD AUTO: 0.05 K/UL (ref 0–0.04)
IMM GRANULOCYTES NFR BLD AUTO: 0.7 % (ref 0–0.5)
LDLC SERPL CALC-MCNC: 138.8 MG/DL (ref 63–159)
LYMPHOCYTES # BLD AUTO: 1.1 K/UL (ref 1–4.8)
LYMPHOCYTES NFR BLD: 15.8 % (ref 18–48)
MCH RBC QN AUTO: 31.6 PG (ref 27–31)
MCHC RBC AUTO-ENTMCNC: 33.4 G/DL (ref 32–36)
MCV RBC AUTO: 95 FL (ref 82–98)
MONOCYTES # BLD AUTO: 0.8 K/UL (ref 0.3–1)
MONOCYTES NFR BLD: 11.9 % (ref 4–15)
NEUTROPHILS # BLD AUTO: 4.9 K/UL (ref 1.8–7.7)
NEUTROPHILS NFR BLD: 69.7 % (ref 38–73)
NONHDLC SERPL-MCNC: 160 MG/DL
NRBC BLD-RTO: 0 /100 WBC
PLATELET # BLD AUTO: 240 K/UL (ref 150–450)
PMV BLD AUTO: 9.8 FL (ref 9.2–12.9)
POTASSIUM SERPL-SCNC: 5.1 MMOL/L (ref 3.5–5.1)
PROT SERPL-MCNC: 7.8 G/DL (ref 6–8.4)
RBC # BLD AUTO: 5.16 M/UL (ref 4.6–6.2)
SODIUM SERPL-SCNC: 137 MMOL/L (ref 136–145)
TESTOST SERPL-MCNC: 333 NG/DL (ref 304–1227)
TRIGL SERPL-MCNC: 106 MG/DL (ref 30–150)
TSH SERPL DL<=0.005 MIU/L-ACNC: 1.61 UIU/ML (ref 0.4–4)
WBC # BLD AUTO: 6.97 K/UL (ref 3.9–12.7)

## 2023-10-17 PROCEDURE — 36415 COLL VENOUS BLD VENIPUNCTURE: CPT | Mod: PO | Performed by: INTERNAL MEDICINE

## 2023-10-17 PROCEDURE — 84443 ASSAY THYROID STIM HORMONE: CPT | Performed by: INTERNAL MEDICINE

## 2023-10-17 PROCEDURE — 80053 COMPREHEN METABOLIC PANEL: CPT | Performed by: INTERNAL MEDICINE

## 2023-10-17 PROCEDURE — 82550 ASSAY OF CK (CPK): CPT | Performed by: INTERNAL MEDICINE

## 2023-10-17 PROCEDURE — 80061 LIPID PANEL: CPT | Performed by: INTERNAL MEDICINE

## 2023-10-17 PROCEDURE — 84403 ASSAY OF TOTAL TESTOSTERONE: CPT | Performed by: INTERNAL MEDICINE

## 2023-10-17 PROCEDURE — 85025 COMPLETE CBC W/AUTO DIFF WBC: CPT | Performed by: INTERNAL MEDICINE

## 2023-10-19 ENCOUNTER — PATIENT MESSAGE (OUTPATIENT)
Dept: PAIN MEDICINE | Facility: CLINIC | Age: 43
End: 2023-10-19
Payer: COMMERCIAL

## 2023-10-19 DIAGNOSIS — M54.9 DORSALGIA, UNSPECIFIED: ICD-10-CM

## 2023-10-23 RX ORDER — CELECOXIB 200 MG/1
200 CAPSULE ORAL DAILY PRN
Qty: 40 CAPSULE | Refills: 1 | Status: SHIPPED | OUTPATIENT
Start: 2023-10-23

## 2023-10-23 RX ORDER — TIZANIDINE 4 MG/1
4 TABLET ORAL NIGHTLY PRN
Qty: 40 TABLET | Refills: 1 | Status: SHIPPED | OUTPATIENT
Start: 2023-10-23

## 2023-11-07 NOTE — PROGRESS NOTES
Labs look good.  Cholesterol elevated from last year but not to a point requiring medication.  Continue to focus on diet to better control.

## 2023-11-15 ENCOUNTER — PATIENT MESSAGE (OUTPATIENT)
Dept: PRIMARY CARE CLINIC | Facility: CLINIC | Age: 43
End: 2023-11-15
Payer: COMMERCIAL

## 2023-11-15 VITALS — DIASTOLIC BLOOD PRESSURE: 78 MMHG | SYSTOLIC BLOOD PRESSURE: 120 MMHG

## 2023-11-28 ENCOUNTER — OFFICE VISIT (OUTPATIENT)
Dept: PRIMARY CARE CLINIC | Facility: CLINIC | Age: 43
End: 2023-11-28
Payer: COMMERCIAL

## 2023-11-28 DIAGNOSIS — R19.7 DIARRHEA, UNSPECIFIED TYPE: ICD-10-CM

## 2023-11-28 DIAGNOSIS — R10.11 RUQ PAIN: Primary | ICD-10-CM

## 2023-11-28 PROCEDURE — 99214 OFFICE O/P EST MOD 30 MIN: CPT | Mod: 95,,, | Performed by: INTERNAL MEDICINE

## 2023-11-28 PROCEDURE — 99214 PR OFFICE/OUTPT VISIT, EST, LEVL IV, 30-39 MIN: ICD-10-PCS | Mod: 95,,, | Performed by: INTERNAL MEDICINE

## 2023-11-28 NOTE — PROGRESS NOTES
[facility] Virtual Visit Note    The patient location is: [place]  The chief complaint leading to consultation is: RUQ pain and diarrhea  Visit type: Virtual visit with synchronous audio and video  Total time spent with patient: 20 min  Each patient to whom he or she provides medical services by telemedicine is:  (1) informed of the relationship between the physician and patient and the respective role of any other health care provider with respect to management of the patient; and (2) notified that he or she may decline to receive medical services by telemedicine and may withdraw from such care at any time.      Chief Complaint    No chief complaint on file.      History of Present Illness      Jorge Bae III is a 43 y.o. male with chronic conditions of testosterone deficiency, depression, chronic low back pain  who presents today for: intermittent of RUQ pain and dyspepsia/abdominal cramps after fatty foods for the past 2 weeks.  Has a few episodes of loose stools (dark green) during this time. Of note, saw hepatology for elevated LFTs in 2022.  Had an abdominal ultrasound at that time which showed punctate gallstones vs debris without signs of gallbladder inflammation.  LFTs normal on recent labs.    Past Medical History:  Past Medical History:   Diagnosis Date    Depression     Hematuria     Testosterone deficiency        Past Surgical History:   has a past surgical history that includes deviated septum repair; Esophagogastroduodenoscopy (N/A, 11/11/2020); Injection of facet joint (Bilateral, 11/3/2021); Epidural steroid injection into lumbar spine (N/A, 7/22/2022); Transforaminal epidural injection of steroid (Bilateral, 8/16/2022); and Transforaminal epidural injection of steroid (Bilateral, 7/7/2023).    Family History:  family history includes Alzheimer's disease in his mother; Early death in his mother; Heart disease in his maternal grandfather; No Known Problems in his father.      Social History:  Social History     Tobacco Use    Smoking status: Never    Smokeless tobacco: Never   Substance Use Topics    Alcohol use: Yes     Alcohol/week: 10.0 standard drinks of alcohol     Types: 10 Glasses of wine per week     Comment: stopped in 2021, 4-6 times per week, 1/2 wine bottle per night    Drug use: Not Currently     Types: Amphetamines       Review of Systems   Constitutional:  Negative for chills, fever and malaise/fatigue.   Respiratory:  Negative for shortness of breath.    Cardiovascular:  Negative for chest pain.   Gastrointestinal:  Negative for constipation, diarrhea, nausea and vomiting.   Skin:  Negative for rash.   Neurological:  Negative for weakness.   All other systems reviewed and are negative.       Medications:  Outpatient Encounter Medications as of 11/28/2023   Medication Sig Dispense Refill    atomoxetine (STRATTERA) 25 MG capsule Take 25 mg by mouth 2 (two) times daily.      baclofen (LIORESAL) 20 MG tablet Take 20 mg by mouth 3 (three) times daily.      buPROPion (WELLBUTRIN XL) 300 MG 24 hr tablet Take 300 mg by mouth once daily.      celecoxib (CELEBREX) 200 MG capsule Take 1 capsule (200 mg total) by mouth daily as needed for Pain. 40 capsule 1    ondansetron (ZOFRAN-ODT) 4 MG TbDL Take 4 mg by mouth every 6 (six) hours as needed.      testosterone cypionate (DEPOTESTOTERONE CYPIONATE) 200 mg/mL injection Inject 150 mg into the muscle every 7 days.      tiZANidine (ZANAFLEX) 4 MG tablet Take 1 tablet (4 mg total) by mouth nightly as needed (muscle spasms). 40 tablet 1     No facility-administered encounter medications on file as of 11/28/2023.       Allergies:  Review of patient's allergies indicates:  No Known Allergies    Health Maintenance:  Immunization History   Administered Date(s) Administered    COVID-19, MRNA, LN-S, PF (MODERNA FULL 0.5 ML DOSE) 03/01/2021, 03/29/2021    Hepatitis A, Adult 06/07/2022    Influenza - Quadrivalent - MDCK - PF 10/27/2020     Influenza - Quadrivalent - PF *Preferred* (6 months and older) 09/22/2015, 09/27/2016, 10/08/2017, 08/29/2018, 09/27/2019, 10/16/2023    Tdap 08/29/2018, 09/10/2021      Health Maintenance   Topic Date Due    Lipid Panel  10/17/2028    TETANUS VACCINE  09/10/2031    Hepatitis C Screening  Completed        Physical Exam       ]    Physical Exam  Constitutional:       General: He is not in acute distress.     Appearance: He is well-developed. He is not diaphoretic.   HENT:      Head: Normocephalic and atraumatic.   Eyes:      General: No scleral icterus.        Right eye: No discharge.         Left eye: No discharge.      Conjunctiva/sclera: Conjunctivae normal.   Pulmonary:      Effort: Pulmonary effort is normal. No respiratory distress.   Neurological:      Mental Status: He is alert and oriented to person, place, and time.   Psychiatric:         Behavior: Behavior normal.         Thought Content: Thought content normal.         Judgment: Judgment normal.          Laboratory:  CBC:  Recent Labs   Lab 02/14/22  1052 10/17/23  1249   WBC 7.83 6.97   RBC 5.40 5.16   Hemoglobin 16.6 16.3   Hematocrit 50.2 48.8   Platelets 264 240   MCV 93 95   MCH 30.7 31.6 H   MCHC 33.1 33.4     CMP:  Recent Labs   Lab 04/26/22  1007 06/29/22  0846 10/17/23  1249   Glucose  --   --  92   Calcium  --   --  10.4   Albumin 4.3 4.3 4.5   Total Protein 7.6 7.3 7.8   Sodium  --   --  137   Potassium  --   --  5.1   CO2  --   --  24   Chloride  --   --  99   BUN  --   --  17   Alkaline Phosphatase 68 66 57   ALT 36 47 H 40   AST 37 43 H 40   Total Bilirubin 1.1 H 1.0 0.7     URINALYSIS:       LIPIDS:  Recent Labs   Lab 02/14/22  1052 10/17/23  1249   TSH 2.278 1.613   HDL 49 52   Cholesterol 173 212 H   Triglycerides 36 106   LDL Cholesterol 116.8 138.8   HDL/Cholesterol Ratio 28.3 24.5   Non-HDL Cholesterol 124 160   Total Cholesterol/HDL Ratio 3.5 4.1     TSH:  Recent Labs   Lab 02/14/22  1052 10/17/23  1249   TSH 2.278 1.613     A1C:         Assessment/Plan     Jorge Bae III is a 43 y.o.male with:    1. RUQ pain  - CT Abdomen With IV Contrast Routine Oral Contrast; Future    2. Diarrhea, unspecified type  Had labs drawn, awaiting results (CMP, amylase, lipase, etc).  Will follow up.  Also ordering CT to eval for gallbladder pathology vs pancreas.  Referral to Gen Surg vs GI depending on results.    Chronic conditions status updated as per HPI.  Other than changes above, cont current medications and maintain follow up with specialists.  No follow-ups on file.    Future Appointments   Date Time Provider Department Center   11/30/2023  3:00 PM aYritza Burden OD [facility] [place]   11/30/2023  4:00 PM Yaritza Burden OD [facility] OPTMANDO Crowley MD  [facility]                  Answers submitted by the patient for this visit:  Abdominal Pain Questionnaire (Submitted on 11/27/2023)  Chief Complaint: Abdominal pain  Chronicity: new  Onset: 1 to 4 weeks ago  Onset quality: sudden  Frequency: 2 to 4 times per day  Episode duration: 2 Hours  Progression since onset: waxing and waning  Pain location: RUQ  Pain - numeric: 4/10  Pain quality: aching, a sensation of fullness  Radiates to: LLQ, RLQ  anorexia: No  arthralgias: No  belching: Yes  flatus: No  hematochezia: No  Aggravated by: bowel movement, eating  Relieved by: nothing  Pain severity: moderate  Treatments tried: nothing  Improvement on treatment: no relief  abdominal surgery: No  colon cancer: No  Crohn's disease: No  gallstones: No  GERD: Yes  irritable bowel syndrome: No  kidney stones: No  pancreatitis: No  PUD: No  ulcerative colitis: No  UTI: No

## 2023-11-28 NOTE — Clinical Note
Please schedule CT scan next available, preferably in [place] but will come to [facility] for the earliest appt

## 2023-11-29 ENCOUNTER — OFFICE VISIT (OUTPATIENT)
Dept: GASTROENTEROLOGY | Facility: CLINIC | Age: 43
End: 2023-11-29
Payer: COMMERCIAL

## 2023-11-29 ENCOUNTER — TELEPHONE (OUTPATIENT)
Dept: GASTROENTEROLOGY | Facility: CLINIC | Age: 43
End: 2023-11-29
Payer: COMMERCIAL

## 2023-11-29 ENCOUNTER — HOSPITAL ENCOUNTER (OUTPATIENT)
Dept: RADIOLOGY | Facility: HOSPITAL | Age: 43
Discharge: HOME | End: 2023-11-29
Attending: INTERNAL MEDICINE
Payer: COMMERCIAL

## 2023-11-29 VITALS — WEIGHT: 184 LBS | HEIGHT: 69 IN | BODY MASS INDEX: 27.25 KG/M2

## 2023-11-29 DIAGNOSIS — R10.11 ABDOMINAL PAIN, RUQ (RIGHT UPPER QUADRANT): Primary | ICD-10-CM

## 2023-11-29 DIAGNOSIS — R10.13 DYSPEPSIA: ICD-10-CM

## 2023-11-29 DIAGNOSIS — R19.7 DIARRHEA, UNSPECIFIED TYPE: ICD-10-CM

## 2023-11-29 DIAGNOSIS — R79.89 ELEVATED LFTS: ICD-10-CM

## 2023-11-29 DIAGNOSIS — R10.11 RUQ PAIN: ICD-10-CM

## 2023-11-29 PROCEDURE — 99999 PR PBB SHADOW E&M-EST. PATIENT-LVL III: CPT | Mod: PBBFAC,,, | Performed by: INTERNAL MEDICINE

## 2023-11-29 PROCEDURE — 3008F PR BODY MASS INDEX (BMI) DOCUMENTED: ICD-10-PCS | Mod: CPTII,S$GLB,, | Performed by: INTERNAL MEDICINE

## 2023-11-29 PROCEDURE — 74160 CT ABDOMEN W/CONTRAST: CPT | Mod: 26,,, | Performed by: STUDENT IN AN ORGANIZED HEALTH CARE EDUCATION/TRAINING PROGRAM

## 2023-11-29 PROCEDURE — 99203 PR OFFICE/OUTPT VISIT, NEW, LEVL III, 30-44 MIN: ICD-10-PCS | Mod: S$GLB,,, | Performed by: INTERNAL MEDICINE

## 2023-11-29 PROCEDURE — 99203 OFFICE O/P NEW LOW 30 MIN: CPT | Mod: S$GLB,,, | Performed by: INTERNAL MEDICINE

## 2023-11-29 PROCEDURE — 74160 CT ABDOMEN WITH IV CONTRAST: ICD-10-PCS | Mod: 26,,, | Performed by: STUDENT IN AN ORGANIZED HEALTH CARE EDUCATION/TRAINING PROGRAM

## 2023-11-29 PROCEDURE — 1159F PR MEDICATION LIST DOCUMENTED IN MEDICAL RECORD: ICD-10-PCS | Mod: CPTII,S$GLB,, | Performed by: INTERNAL MEDICINE

## 2023-11-29 PROCEDURE — 25500020 PHARM REV CODE 255: Performed by: INTERNAL MEDICINE

## 2023-11-29 PROCEDURE — 99999 PR PBB SHADOW E&M-EST. PATIENT-LVL III: ICD-10-PCS | Mod: PBBFAC,,, | Performed by: INTERNAL MEDICINE

## 2023-11-29 PROCEDURE — 3008F BODY MASS INDEX DOCD: CPT | Mod: CPTII,S$GLB,, | Performed by: INTERNAL MEDICINE

## 2023-11-29 PROCEDURE — A9698 NON-RAD CONTRAST MATERIALNOC: HCPCS | Performed by: INTERNAL MEDICINE

## 2023-11-29 PROCEDURE — 74160 CT ABDOMEN W/CONTRAST: CPT | Mod: TC

## 2023-11-29 PROCEDURE — 1159F MED LIST DOCD IN RCRD: CPT | Mod: CPTII,S$GLB,, | Performed by: INTERNAL MEDICINE

## 2023-11-29 RX ORDER — ESOMEPRAZOLE MAGNESIUM 40 MG/1
40 CAPSULE, DELAYED RELEASE ORAL DAILY
Qty: 30 CAPSULE | Refills: 2 | Status: SHIPPED | OUTPATIENT
Start: 2023-11-29 | End: 2024-02-26

## 2023-11-29 RX ADMIN — IOHEXOL 75 ML: 350 INJECTION, SOLUTION INTRAVENOUS at 09:11

## 2023-11-29 RX ADMIN — IOHEXOL 1000 ML: 9 SOLUTION ORAL at 08:11

## 2023-11-29 NOTE — PROGRESS NOTES
"Pt presents for evaluation RUQ abd pain. Pt describes intermittent RUQ "sharp" pain past 3 weeks. Initial pain was constant and lasted 3-4 days, now just intermittent for approx an hour, usually post-prandial. Some mild nausea, no vomiting. Tolerating diet. Weight stable. No fever or jaundice. No GI bleeding. Positive diarrhea, usually several loose bowel movements daily. No constipation. Positive dyspepsia. No pyrosis. No nocturnal diarrhea or symptoms. No hx ulcer disease. Chronic mild elevation of liver enzymes evaluated by Hepatology last year, unremarkable. No hx hepatitis, new medication, or foreign travel. Drinks socially on weekends. LFT's currently stable with borderline AST/ALT of 53 (upper limit of normal 50). Bili and AP normal. Lipase normal. CT scan this AM unremarkable except possible thickening several loops of small bowel vs under-distension. Prior U/S in 2022 questionable small gallstones/sludge.     REVIEW OF SYSTEMS:   Constitutional: Negative for fever, appetite change and unexpected weight change.  HENT: Negative for sore throat and trouble swallowing.  Eyes: Negative for visual disturbance.  Respiratory: Negative for chest tightness, shortness of breath and wheezing.  Cardiovascular: Negative for chest pain.  Gastrointestinal:  as per HPI  Genitourinary: Negative for dysuria, frequency and hematuria.  Musculoskeletal: Negative for myalgias, joint swelling and arthralgias.  Skin: Negative for color change and rash.   Neurological: Negative for syncope, weakness and headaches.  Psychiatric/Behavioral: Negative for confusion.    PHYSICAL EXAMINATION:                                                        GENERAL:  Comfortable, in no acute distress.      SKIN: Non-jaundiced.                             HEENT EXAM:  Nonicteric.  No adenopathy.  Oropharynx is clear.               NECK:  Supple.                                                               LUNGS:  Clear.                               "                                 CARDIAC:  Regular rate and rhythm.  S1, S2.  No murmur.                      ABDOMEN:  Soft, positive bowel sounds, positive tenderness to palpation epigastrium and mid-abdomen.  No hepatosplenomegaly or masses.  No rebound or guarding.                                             EXTREMITIES:  No edema.     NEURO: CN II-XII intact; motor/sensory non-focal.    IMP: 1. RUQ abd pain            2. Diarrhea           3. Dyspepsia           4. 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    PLAN: 1. Empiric PPI             2. Stool evaluation             3. EGD             4. GB/Abd U/S             5. Further recommendations following above.

## 2023-11-30 ENCOUNTER — TELEPHONE (OUTPATIENT)
Dept: GASTROENTEROLOGY | Facility: CLINIC | Age: 43
End: 2023-11-30
Payer: COMMERCIAL

## 2023-11-30 ENCOUNTER — OFFICE VISIT (OUTPATIENT)
Dept: OPTOMETRY | Facility: CLINIC | Age: 43
End: 2023-11-30
Payer: COMMERCIAL

## 2023-11-30 ENCOUNTER — LAB VISIT (OUTPATIENT)
Dept: LAB | Facility: HOSPITAL | Age: 43
End: 2023-11-30
Payer: COMMERCIAL

## 2023-11-30 DIAGNOSIS — H10.13 ALLERGIC CONJUNCTIVITIS, BILATERAL: Primary | ICD-10-CM

## 2023-11-30 DIAGNOSIS — Z97.3 WEARS CONTACT LENSES: ICD-10-CM

## 2023-11-30 DIAGNOSIS — H52.203 MYOPIA WITH ASTIGMATISM, BILATERAL: ICD-10-CM

## 2023-11-30 DIAGNOSIS — Z46.0 FITTING AND ADJUSTMENT OF SPECTACLES AND CONTACT LENSES: Primary | ICD-10-CM

## 2023-11-30 DIAGNOSIS — H52.13 MYOPIA WITH ASTIGMATISM, BILATERAL: ICD-10-CM

## 2023-11-30 DIAGNOSIS — R19.7 DIARRHEA, UNSPECIFIED TYPE: ICD-10-CM

## 2023-11-30 PROCEDURE — 1159F MED LIST DOCD IN RCRD: CPT | Mod: CPTII,S$GLB,,

## 2023-11-30 PROCEDURE — 99999 PR PBB SHADOW E&M-EST. PATIENT-LVL III: CPT | Mod: PBBFAC,,,

## 2023-11-30 PROCEDURE — 87427 SHIGA-LIKE TOXIN AG IA: CPT | Mod: 59 | Performed by: INTERNAL MEDICINE

## 2023-11-30 PROCEDURE — 92004 COMPRE OPH EXAM NEW PT 1/>: CPT | Mod: S$GLB,,,

## 2023-11-30 PROCEDURE — 1159F PR MEDICATION LIST DOCUMENTED IN MEDICAL RECORD: ICD-10-PCS | Mod: CPTII,S$GLB,,

## 2023-11-30 PROCEDURE — 87046 STOOL CULTR AEROBIC BACT EA: CPT | Performed by: INTERNAL MEDICINE

## 2023-11-30 PROCEDURE — 92015 PR REFRACTION: ICD-10-PCS | Mod: S$GLB,,,

## 2023-11-30 PROCEDURE — 92310 PR CONTACT LENS FITTING (NO CHANGE): ICD-10-PCS | Mod: CSM,S$GLB,,

## 2023-11-30 PROCEDURE — 92015 DETERMINE REFRACTIVE STATE: CPT | Mod: S$GLB,,,

## 2023-11-30 PROCEDURE — 92310 CONTACT LENS FITTING OU: CPT | Mod: CSM,S$GLB,,

## 2023-11-30 PROCEDURE — 87449 NOS EACH ORGANISM AG IA: CPT | Performed by: INTERNAL MEDICINE

## 2023-11-30 PROCEDURE — 87449 NOS EACH ORGANISM AG IA: CPT | Mod: 91 | Performed by: INTERNAL MEDICINE

## 2023-11-30 PROCEDURE — 99999 PR PBB SHADOW E&M-EST. PATIENT-LVL III: ICD-10-PCS | Mod: PBBFAC,,,

## 2023-11-30 PROCEDURE — 87329 GIARDIA AG IA: CPT | Performed by: INTERNAL MEDICINE

## 2023-11-30 PROCEDURE — 87045 FECES CULTURE AEROBIC BACT: CPT | Performed by: INTERNAL MEDICINE

## 2023-11-30 PROCEDURE — 99499 NO LOS: ICD-10-PCS | Mod: ,,,

## 2023-11-30 PROCEDURE — 99499 UNLISTED E&M SERVICE: CPT | Mod: ,,,

## 2023-11-30 PROCEDURE — 87209 SMEAR COMPLEX STAIN: CPT | Performed by: INTERNAL MEDICINE

## 2023-11-30 PROCEDURE — 92004 PR EYE EXAM, NEW PATIENT,COMPREHESV: ICD-10-PCS | Mod: S$GLB,,,

## 2023-11-30 NOTE — PROGRESS NOTES
HPI    Routine eye exam-dle-1 year    Pt complains of blurred dva. Needing updated wrx and clrx. States eyes get   red and irritated when wearing current contacts this time of year. Using   maxitrol prn from last eye doc. Denies any flashes or floaters.  Last edited by Yaritza Burden, OD on 11/30/2023  5:56 PM.            Assessment /Plan     For exam results, see Encounter Report.    Allergic conjunctivitis, bilateral    Myopia with astigmatism, bilateral    Wears contact lenses      Discussed allergic conjunctivitis as likely etiology of eye redness and CL intolerance around this time of year. No GPC on lid eversion. Recommended pre-treating with OTC allergy drops prior to CL insertion daily, especially during allergy season. Ed pt to RTC if symptoms worsen or fail to resolve. Otherwise, monitor.  Discussed spectacle options with pt and released final spec rx. Cut minus and cyl to aid with better adaptation. Ed pt on change in rx and adaptation.  Pt states current lenses often make eyes irritated and red this time of year. Discussed option of switching CL brands. Recommended daily lenses for maximum comfort. Dispensed trials of Precision 1 in -3.25 and -3.50 DS. Excellent initial fit and comfort with lenses. Also dispensed Acuvue Marycruz and Biofinity lenses in case pt prefers monthly modality. Ed pt to call or message with whichever lens and power is preferred and will finalize. Reviewed importance of good contact lens hygiene, routine monthly (or daily) replacement of lenses, and to never sleep in lenses. Pt knows to call or message if any issues arise.  Pt messaged 02/23/24 stating he liked Marycruz lenses the best. Finalized.    RTC: 1 year for comprehensive exam or sooner prn

## 2023-11-30 NOTE — TELEPHONE ENCOUNTER
Called and spoke with the patient, procedure scheduled with the patient at [facility] which patient is available that date and Dr. Chambers is scoping there that date 12/04/2023, janet is out of town 12/06/2023 which was offered to the patient here at [facility], patient verbalized understanding of this.

## 2023-12-01 ENCOUNTER — HOSPITAL ENCOUNTER (OUTPATIENT)
Dept: RADIOLOGY | Facility: HOSPITAL | Age: 43
Discharge: HOME | End: 2023-12-01
Attending: INTERNAL MEDICINE
Payer: COMMERCIAL

## 2023-12-01 DIAGNOSIS — R10.11 ABDOMINAL PAIN, RUQ (RIGHT UPPER QUADRANT): ICD-10-CM

## 2023-12-01 LAB
C DIFF GDH STL QL: NEGATIVE
C DIFF TOX A+B STL QL IA: NEGATIVE
CRYPTOSP AG STL QL IA: NEGATIVE
E COLI SXT1 STL QL IA: NEGATIVE
E COLI SXT2 STL QL IA: NEGATIVE
G LAMBLIA AG STL QL IA: NEGATIVE

## 2023-12-01 PROCEDURE — 76700 US EXAM ABDOM COMPLETE: CPT | Mod: 26,,, | Performed by: RADIOLOGY

## 2023-12-01 PROCEDURE — 76700 US ABDOMEN COMPLETE: ICD-10-PCS | Mod: 26,,, | Performed by: RADIOLOGY

## 2023-12-01 PROCEDURE — 76700 US EXAM ABDOM COMPLETE: CPT | Mod: TC,PO

## 2023-12-02 LAB — BACTERIA STL CULT: NORMAL

## 2023-12-03 LAB — O+P STL MICRO: NORMAL

## 2023-12-29 RX ORDER — CELECOXIB 200 MG/1
200 CAPSULE ORAL DAILY PRN
Qty: 40 CAPSULE | Refills: 1 | Status: CANCELLED | OUTPATIENT
Start: 2023-12-29

## 2024-02-23 ENCOUNTER — PATIENT MESSAGE (OUTPATIENT)
Dept: OPTOMETRY | Facility: CLINIC | Age: 44
End: 2024-02-23
Payer: COMMERCIAL

## 2024-02-26 RX ORDER — ESOMEPRAZOLE MAGNESIUM 40 MG/1
40 CAPSULE, DELAYED RELEASE ORAL
Qty: 90 CAPSULE | Refills: 1 | Status: SHIPPED | OUTPATIENT
Start: 2024-02-26

## 2024-04-23 ENCOUNTER — HOSPITAL ENCOUNTER (OUTPATIENT)
Dept: RADIOLOGY | Facility: HOSPITAL | Age: 44
Discharge: HOME | End: 2024-04-23
Attending: ORTHOPAEDIC SURGERY
Payer: COMMERCIAL

## 2024-04-23 ENCOUNTER — OFFICE VISIT (OUTPATIENT)
Dept: ORTHOPEDICS | Facility: CLINIC | Age: 44
End: 2024-04-23
Payer: COMMERCIAL

## 2024-04-23 VITALS — HEIGHT: 69 IN | BODY MASS INDEX: 25.18 KG/M2 | WEIGHT: 170 LBS

## 2024-04-23 DIAGNOSIS — M25.511 RIGHT SHOULDER PAIN: ICD-10-CM

## 2024-04-23 DIAGNOSIS — M25.512 LEFT SHOULDER PAIN, UNSPECIFIED CHRONICITY: ICD-10-CM

## 2024-04-23 DIAGNOSIS — M54.16 LUMBAR RADICULOPATHY: Primary | ICD-10-CM

## 2024-04-23 DIAGNOSIS — M54.12 CERVICAL RADICULOPATHY: ICD-10-CM

## 2024-04-23 DIAGNOSIS — M25.512 ACUTE PAIN OF LEFT SHOULDER: Primary | ICD-10-CM

## 2024-04-23 DIAGNOSIS — M25.512 ACUTE PAIN OF LEFT SHOULDER: ICD-10-CM

## 2024-04-23 PROCEDURE — 1159F MED LIST DOCD IN RCRD: CPT | Mod: CPTII,S$GLB,, | Performed by: ORTHOPAEDIC SURGERY

## 2024-04-23 PROCEDURE — 99214 OFFICE O/P EST MOD 30 MIN: CPT | Mod: S$GLB,,, | Performed by: ORTHOPAEDIC SURGERY

## 2024-04-23 PROCEDURE — 3008F BODY MASS INDEX DOCD: CPT | Mod: CPTII,S$GLB,, | Performed by: ORTHOPAEDIC SURGERY

## 2024-04-23 PROCEDURE — 99999 PR PBB SHADOW E&M-EST. PATIENT-LVL III: CPT | Mod: PBBFAC,,, | Performed by: ORTHOPAEDIC SURGERY

## 2024-04-23 PROCEDURE — 73030 X-RAY EXAM OF SHOULDER: CPT | Mod: 26,LT,, | Performed by: RADIOLOGY

## 2024-04-23 PROCEDURE — 73030 X-RAY EXAM OF SHOULDER: CPT | Mod: TC,PO,LT

## 2024-04-23 PROCEDURE — 1160F RVW MEDS BY RX/DR IN RCRD: CPT | Mod: CPTII,S$GLB,, | Performed by: ORTHOPAEDIC SURGERY

## 2024-04-23 RX ORDER — METHOCARBAMOL 750 MG/1
750 TABLET, FILM COATED ORAL 4 TIMES DAILY PRN
Qty: 44 TABLET | Refills: 3 | Status: SHIPPED | OUTPATIENT
Start: 2024-04-23

## 2024-04-29 ENCOUNTER — PATIENT MESSAGE (OUTPATIENT)
Dept: OTOLARYNGOLOGY | Facility: CLINIC | Age: 44
End: 2024-04-29
Payer: COMMERCIAL

## 2024-04-29 NOTE — LETTER
[facility]  [address]  [place] [address]  Phone: [phone]  Fax: [phone] April 29, 2024     Patient: Jorge Bae III   YOB: 1980   Date of Visit: 4/29/2024       To Whom It May Concern:    Jorge Bae III is a patient under my care for treatment of obstructive sleep apnea. He has mild sleep apnea and has been using PAP with fair success. Recently, he decided to pursue a dental appliance for treatment, which is an approved option for mild sleep apnea. He has noted a significant improvement in symptoms and will continue using this in lieu of his PAP. He will be returning his PAP to continue his treatment with oral appliance.     If you have any questions or concerns, please don't hesitate to contact my office.    Sincerely,    Logan Camacho MD  Otolaryngology - Head and Neck Surgery  Office: [phone]  Cell: [phone]  Fax: [phone]    This message was generated using voice dictation. Please excuse any errors that may have been created by the transcription software.

## 2024-05-07 ENCOUNTER — PATIENT MESSAGE (OUTPATIENT)
Dept: PAIN MEDICINE | Facility: CLINIC | Age: 44
End: 2024-05-07
Payer: COMMERCIAL

## 2024-05-08 ENCOUNTER — PATIENT MESSAGE (OUTPATIENT)
Dept: PAIN MEDICINE | Facility: CLINIC | Age: 44
End: 2024-05-08
Payer: COMMERCIAL

## 2024-05-13 RX ORDER — CELECOXIB 200 MG/1
200 CAPSULE ORAL DAILY PRN
Qty: 40 CAPSULE | Refills: 1 | Status: SHIPPED | OUTPATIENT
Start: 2024-05-13

## 2024-05-16 ENCOUNTER — OFFICE VISIT (OUTPATIENT)
Dept: PAIN MEDICINE | Facility: CLINIC | Age: 44
End: 2024-05-16
Attending: ANESTHESIOLOGY
Payer: COMMERCIAL

## 2024-05-16 DIAGNOSIS — M47.819 SPONDYLOSIS WITHOUT MYELOPATHY OR RADICULOPATHY: ICD-10-CM

## 2024-05-16 DIAGNOSIS — M47.896 OTHER SPONDYLOSIS, LUMBAR REGION: Primary | ICD-10-CM

## 2024-05-16 PROCEDURE — 1160F RVW MEDS BY RX/DR IN RCRD: CPT | Mod: CPTII,95,, | Performed by: ANESTHESIOLOGY

## 2024-05-16 PROCEDURE — 1159F MED LIST DOCD IN RCRD: CPT | Mod: CPTII,95,, | Performed by: ANESTHESIOLOGY

## 2024-05-16 PROCEDURE — 99214 OFFICE O/P EST MOD 30 MIN: CPT | Mod: 95,,, | Performed by: ANESTHESIOLOGY

## 2024-05-16 RX ORDER — TIZANIDINE 4 MG/1
4 TABLET ORAL NIGHTLY PRN
Qty: 40 TABLET | Refills: 1 | Status: SHIPPED | OUTPATIENT
Start: 2024-05-16 | End: 2024-06-18

## 2024-05-17 ENCOUNTER — PATIENT MESSAGE (OUTPATIENT)
Dept: PAIN MEDICINE | Facility: OTHER | Age: 44
End: 2024-05-17
Payer: COMMERCIAL

## 2024-05-17 DIAGNOSIS — M47.896 OTHER SPONDYLOSIS, LUMBAR REGION: Primary | ICD-10-CM

## 2024-05-17 NOTE — PROGRESS NOTES
Chronic Pain-Tele-Medicine-Established Note (Follow up visit)      The patient location is: home  The chief complaint leading to consultation is: back pain   Visit type: Virtual visit with synchronous audio and video  Total time spent with patient: 20 min  Each patient to whom he or she provides medical services by telemedicine is:  (1) informed of the relationship between the physician and patient and the respective role of any other health care provider with respect to management of the patient; and (2) notified that he or she may decline to receive medical services by telemedicine and may withdraw from such care at any time.    Notes:     SUBJECTIVE:    Interval HPI 5/16/24  Jorge Bae III presents tele-medicine appointment for a follow-up appointment for worsening low axial back pain . Since the last visit, Jorge Bae III states the pain has been worsening. Current pain intensity is 8/10. Patient states this pain is different with no radiation down the leg and similar to his initial pain when he saw me on 10/2021 and had responded very well to facet joint injections.  He also has neck pain radiating down the hands with some numbness in his fingers but states his back pain is more severe and he would like to focus on the low back pain first      Interval HPI 6/19/23: Mr. Bae returns to the office for follow-up.  Today he reports worsening back pain that happened over the past few days.  He reports he was moving into a new house and was lifting boxes when the pain worsened.  He can get radiating pain down the left greater than right buttocks.   He is not having any new numbness or weakness in the lower extremities.  He does report over the past few months he has been waking up with some numbness into the 3rd and 4th digit left greater than right of his hands.     Interval HPI 9/9/22: Mr. Bae returns via telemedicine for follow-up.  He is status post bilateral L5-S1 TFESI on  08/16/2022.  When I previously saw him he had some weakness of the left EHL.  The weakness had improved during his neurosurgical follow-up and he continues to say today that he feels like he is having improvement in this.  He is not having any new or worsening radicular pain, numbness, or weakness.  He has been active with swimming and PT directed home exercise.     Interval History 4/18/2022:  Patient seen for virtual follow-up today.  He reports that his back pain has slowly started to return over the past couple months.  He has been doing some stretching and home exercises without significant improvement.  He is interested in repeating the facet joint injections as they gave him good relief for a few months last time. In addition to his low back pain, he is also having some right lateral hip pain in the area of his GTB.  He denies any new numbness, tingling, weakness, saddle anesthesia, or bowel/bladder dysfunction.     Interval History 2/21/2022:  Mr Bae presents virtually for complaint of lower back pain. He states overall doing fair today. He had exacerbation of pain and voiced concern that while it has improved with left over PO steroid he is going skiing in upcoming weeks. He continues to take baclofen in conjunction with Zanaflex. He denies SE of medications. Denies new areas of pain.      Interval history 12/20/21:  Jorge Bae III presents tele-medicine appointment for a follow-up appointment for low back pain. He is s/p bilateral L4/5 and L5/S1 facet joint injections with 100% relief for ~2 weeks. Since the last visit, Jorge Bae states the pain has been improving. Current pain intensity is 4/10. His low back pain is still present after those two weeks of complete relief but significantly better. He does endorse some lateral hip pain that feels tight, nonradaiting. It can be present on left, right, or both. Stretching and theragun help the tightness. His back pain is worsened by forward  "flexion and running.        Initial consult 10/28/2021:  Jorge Bae III presents to the clinic for the evaluation of low back pain. The pain started 2-3 years ago following incident of twisting while carrying heavy objects and symptoms have been worsening.The pain is located in the lower back area and radiates to bilateral buttocks area and back of the thighs.  The pain is described as aching, boring, sharp, shooting and tight band and is rated as 6/10. The pain is rated with a score of  4/10 on the BEST day and a score of 8/10 on the WORST day.  Symptoms interfere with daily activity and sleeping. The pain is exacerbated by Sitting, Standing, Walking, Lifting and Getting out of bed/chair.  The pain is mitigated by heat, ice and medications.  Patient states he had been playing golf and regularly was doing exercises his entire adult life and now his lower extremity pain is affecting his activities of daily living  Patient denies night fever/night sweats, urinary incontinence, bowel incontinence, significant weight loss, significant motor weakness and loss of sensations.    Pain Medications:    - Opioids: none  - Anti-Depressants: none  - Anti-Convulsants: none  - Others: Celebrex and Zanaflex      Physical Therapy/Home Exercise: yes  HE with limited benefit     report:  Reviewed and consistent with medication use as prescribed.    Pain Procedures:  -bilateral L4/5 and L5/S1 facet joint injections on 11/3/21 90% RELIEF   -L5-S1 on 07/22/2022 and reports 85-90% relief   - bilateral L5-S1 TFESI on 08/16/2022 with over 90% relief    Imaging:    MRI CERVICAL SPINE WITHOUT CONTRAST 4/24     CLINICAL HISTORY:  Myelopathy, acute, cervical spine;  Radiculopathy, cervical region. Left sided cervical pain for 2-3 months. No specific injury but pt states he moved his head a "certain way". He states it hurt enough to keep him up at time for a  while. It's better now but still having intermittent tingling of the left and " occasionally the right 3rd-5th fingers.     TECHNIQUE:  Multiplanar, multisequence MR images of the cervical spine were acquired without the administration of contrast.     COMPARISON:  None.     FINDINGS:  CORD: Normal size and signal.  No syrinx.  Cervicomedullary junction is normal.  Incidental small left foraminal perineural cyst at T1-2.     ALIGNMENT: Normal.  Lateral masses of C1 and C2 are congruent.     BONES: Vertebral body heights are maintained.  No aggressive bone marrow signal.     PARASPINAL AREA: Normal.     CERVICAL DISC LEVELS:     C2-C3: Mild disc osteophyte complex.  Moderate left facet hypertrophy.  Preserved ventral and dorsal CSF surrounding the cord.  Moderate-severe left and mild-moderate right foraminal stenosis.     C3-C4: Mild disc osteophyte complex.  Mild bilateral facet hypertrophy.  Preserved ventral and dorsal CSF surrounding the cord.  Mild bilateral foraminal stenosis.     C4-C5: Mild disc osteophyte complex, eccentric to the right.  Minimal right facet hypertrophy.  Slight ventral cord flattening with preserved ventral and dorsal CSF surrounding the cord.  Mild-moderate right foraminal stenosis.     C5-C6: Moderate disc osteophyte complex with right lateral recess soft disc extrusion.  Mild-moderate right ventral cord flattening with effacement of ventral and thin sliver preserved dorsal CSF surrounding the cord.  Moderate right and mild left foraminal stenosis.     C6-C7: Mild disc osteophyte complex with right paracentral likely soft disc extrusion.  Mild bilateral facet hypertrophy.  Slight ventral cord flattening with thin sliver preserved ventral and preserved dorsal CSF surrounding the cord.  Moderate right and mild-moderate left foraminal stenosis.     C7-T1: Mild disc osteophyte complex.  Mild left and minimal right facet hypertrophy.  Preserved ventral and dorsal CSF surrounding the cord.  Mild left foraminal stenosis.     Impression:     1. Multilevel spondylosis,  greatest at C5-6 where there is mild-moderate right cord flattening with some preserved CSF surrounding the cord and no cord signal abnormality.  2. Multilevel foraminal stenosis, greatest on the left at C2-3 where it is moderate-severe.  Moderate foraminal stenosis on the right at C5-6 and C6-7.  MRI LUMBAR SPINE WITHOUT CONTRAST 4/24     CLINICAL HISTORY:  Lumbar radiculopathy, no red flags, no prior management; radiculopathy, lumbar region.  Chronic low back, per patient bulging disk at L5-S1. When standing and putting pressure on the left leg the pain shoots from the low back into the left buttock and down the later left thigh. At times this makes his leg feel weak like it wants to give out.     TECHNIQUE:  Multiplanar, multisequence MR images were acquired from the thoracolumbar junction to the sacrum without the administration of contrast.     COMPARISON:  MRI lumbar spine without contrast, 08/13/2022.     FINDINGS:  NOMENCLATURE: Five lumbar type vertebral bodies.     CORD/CAUDA EQUINA: Conus has normal size and signal and ends at a normal level of L1-L2.     ALIGNMENT: Normal.     BONES: Vertebral body heights are maintained.  Prominent type 1 endplate changes at L5-S1.  No aggressive bone marrow signal.     PARASPINAL AREA: Normal.     LUMBAR DISC LEVELS:     T12-L1: No disc herniation or significant posterior osteophytic ridging.  No significant spinal canal or foraminal stenosis.     L1-L2: No disc herniation or significant posterior osteophytic ridging.  No significant spinal canal or foraminal stenosis.     L2-L3: No disc herniation or significant posterior osteophytic ridging.  Minimal right greater than left facet hypertrophy.  No significant spinal canal or foraminal stenosis.     L3-L4: Minimal disc bulge..  Mild right and minimal left facet hypertrophy.  Ligamentum flavum thickening.  Minimal narrowing of the bilateral lateral recesses.  No significant spinal canal or foraminal stenosis.     L4-L5:  Mild disc bulge with tiny central protrusion with high-intensity zone in the protrusion.  Slight disc height loss with loss of normal T2 signal in the disc.  Minimal bilateral facet hypertrophy.  Mild-moderate narrowing of the bilateral lateral recesses, unchanged.  No significant spinal canal stenosis.  Mild bilateral foraminal stenosis, unchanged.     L5-S1: Mild disc bulge with superimposed central extrusion migrating cranially to the infra pedicle level, similar in size compared to prior study.  Disc height loss and loss of normal T2 signal in the disc.  Mild-moderate narrowing of the bilateral lateral recesses.  No significant spinal canal stenosis.  Mild bilateral foraminal stenosis, unchanged.     Impression:     1. Multilevel spondylosis, similar to prior study from 08/13/2022, greatest at L4-5 and L5-S1 where there is mild-moderate narrowing of the lateral recesses without significant spinal canal stenosis.  2. Mild foraminal stenosis bilaterally at L4-5 and L5-S1.       MRI lumbar spine 11/9/21  FINDINGS:  The lumbar spine demonstrates proper alignment. The vertebral bodies show normal signal intensity and height with no indication of acute fracture or pathologic marrow replacement process.  Multilevel disc desiccation most prominent L5-S1.     The demonstrated portion of the spinal cord is normal in signal intensity at all levels with no indication of myelomalacia or cord edema. Conus terminates at L1.  Evaluation of the surrounding soft tissue structures demonstrates no acute abnormality.     Degenerative findings:     T12-L1: No significant spinal canal stenosis or neural foraminal narrowing.  L1-L2: No significant spinal canal stenosis or neural foraminal narrowing.  L2-L3: No significant spinal canal stenosis or neural foraminal narrowing.  L3-L4: Mild right facet arthropathy.  No canal or foraminal stenosis.  L4-L5: Disc bulge with central annular fissure.  Bilateral facet arthropathy.  No canal  stenosis.  Mild bilateral foraminal stenosis.  L5-S1: Disc bulge with central extrusion and cranial migration.  Mild canal stenosis.  Moderate bilateral foraminal stenosis.  Mild edema in the posterior aspect of the L5 inferior endplate.     MRI lumbar spine 8/13/22  FINDINGS:  The marrow signal is within normal limits.  The conus lies at L1 vertebral body level.  L1/2: There are no significant degenerative changes at this level.     L2/3: There are no significant degenerative changes at this level.  L3/4: There are no significant degenerative changes at this level.  L4/5: There is a posteriorly oriented disc protrusion with small annular tear.  L5/S1: There is a posteriorly oriented disc extrusion with moderate narrowing of bilateral neural foramina.       Past Medical History:   Diagnosis Date    Depression     Hematuria     Testosterone deficiency      Past Surgical History:   Procedure Laterality Date    deviated septum repair      EPIDURAL STEROID INJECTION INTO LUMBAR SPINE N/A 7/22/2022    Procedure: Injection-steroid-epidural-lumbar L5/S1;  Surgeon: Reno Yu MD;  Location: [facility];  Service: Pain Management;  Laterality: N/A;    ESOPHAGOGASTRODUODENOSCOPY N/A 11/11/2020    Procedure: EGD (ESOPHAGOGASTRODUODENOSCOPY);  Surgeon: Yunior Stevens MD;  Location: [facility];  Service: Endoscopy;  Laterality: N/A;    ESOPHAGOGASTRODUODENOSCOPY N/A 12/4/2023    Procedure: EGD (ESOPHAGOGASTRODUODENOSCOPY);  Surgeon: Mike Chambers MD;  Location: [facility];  Service: Gastroenterology;  Laterality: N/A;    INJECTION OF FACET JOINT Bilateral 11/3/2021    Procedure: INJECTION, FACET JOINT BILATERAL L4/5, L5/S1;  Surgeon: Mateus Vargas MD;  Location: [facility];  Service: Pain Management;  Laterality: Bilateral;    TRANSFORAMINAL EPIDURAL INJECTION OF STEROID Bilateral 8/16/2022    Procedure: Injection,steroid,epidural,transforaminal approach L5/S1;  Surgeon: Reno uY MD;  Location: [facility];   Service: Pain Management;  Laterality: Bilateral;    TRANSFORAMINAL EPIDURAL INJECTION OF STEROID Bilateral 7/7/2023    Procedure: Injection,steroid,epidural,transforaminal approach;  Surgeon: Reno Yu MD;  Location: [facility];  Service: Pain Management;  Laterality: Bilateral;  L5/S1     Social History     Socioeconomic History    Marital status:    Tobacco Use    Smoking status: Never    Smokeless tobacco: Never   Substance and Sexual Activity    Alcohol use: Yes     Alcohol/week: 10.0 standard drinks of alcohol     Types: 10 Glasses of wine per week     Comment: stopped in 2021, 4-6 times per week, 1/2 wine bottle per night    Drug use: Not Currently     Types: Amphetamines    Sexual activity: Yes     Partners: Female     Birth control/protection: None     Comment: partner has Mirena   Social History Narrative    January 4    Zia 16 mos    Works as .     Social Determinants of Health     Financial Resource Strain: Low Risk  (4/23/2024)    Overall Financial Resource Strain (CARDIA)     Difficulty of Paying Living Expenses: Not hard at all   Food Insecurity: No Food Insecurity (4/23/2024)    Hunger Vital Sign     Worried About Running Out of Food in the Last Year: Never true     Ran Out of Food in the Last Year: Never true   Transportation Needs: Unmet Transportation Needs (4/23/2024)    PRAPARE - Transportation     Lack of Transportation (Medical): Yes     Lack of Transportation (Non-Medical): No   Physical Activity: Sufficiently Active (4/23/2024)    Exercise Vital Sign     Days of Exercise per Week: 7 days     Minutes of Exercise per Session: 70 min   Stress: No Stress Concern Present (4/23/2024)    [employer] - Occupational Stress Questionnaire     Feeling of Stress : Only a little   Housing Stability: Low Risk  (2/4/2022)    Housing Stability Vital Sign     Unable to Pay for Housing in the Last Year: No     Number of Places Lived in the Last Year: 1      Unstable Housing in the Last Year: No     Family History   Problem Relation Name Age of Onset    Alzheimer's disease Mother Mirela Bae     Early death Mother Mirela Bae     No Known Problems Father      Heart disease Maternal Grandfather Roge Do     Glaucoma Neg Hx      Macular degeneration Neg Hx      Retinal detachment Neg Hx         Review of patient's allergies indicates:  No Known Allergies    Current Outpatient Medications   Medication Sig    ALPRAZolam (XANAX) 1 MG tablet TAKE 1 TABLET BY MOUTH 1 HOUR PRIOR TO APPOINTMENT. BRING BOTTLE WITH YOU TO APPOINTMENT. MUST HAVE .    atomoxetine (STRATTERA) 25 MG capsule Take 25 mg by mouth 2 (two) times daily.    baclofen (LIORESAL) 20 MG tablet Take 20 mg by mouth 3 (three) times daily.    buPROPion (WELLBUTRIN XL) 300 MG 24 hr tablet Take 300 mg by mouth once daily.    celecoxib (CELEBREX) 200 MG capsule Take 1 capsule (200 mg total) by mouth daily as needed for Pain.    esomeprazole (NEXIUM) 40 MG capsule TAKE 1 CAPSULE BY MOUTH EVERY DAY    methocarbamoL (ROBAXIN) 750 MG Tab Take 1 tablet (750 mg total) by mouth 4 (four) times daily as needed.    ondansetron (ZOFRAN-ODT) 4 MG TbDL Take 4 mg by mouth every 6 (six) hours as needed.    testosterone cypionate (DEPOTESTOTERONE CYPIONATE) 200 mg/mL injection Inject 150 mg into the muscle every 7 days.    tiZANidine (ZANAFLEX) 4 MG tablet Take 1 tablet (4 mg total) by mouth nightly as needed (muscle spasms).     No current facility-administered medications for this visit.       REVIEW OF SYSTEMS:    GENERAL:  No weight loss, malaise or fevers.  HEENT:   No recent changes in vision or hearing  NECK:  Negative for lumps, no difficulty with swallowing.  RESPIRATORY:  Negative for cough, wheezing or shortness of breath, patient denies any recent URI.  CARDIOVASCULAR:  Negative for chest pain, leg swelling or palpitations.  GI:  Negative for abdominal discomfort, blood in stools or black stools or change in  bowel habits.  MUSCULOSKELETAL:  See HPI.  SKIN:  Negative for lesions, rash, and itching.  PSYCH:  No mood disorder or recent psychosocial stressors.  Patients sleep is not disturbed secondary to pain.  HEMATOLOGY/LYMPHOLOGY:  Negative for prolonged bleeding, bruising easily or swollen nodes.  Patient is not currently taking any anti-coagulants  NEURO:   No history of headaches, syncope, paralysis, seizures or tremors.  All other reviewed and negative other than HPI.    OBJECTIVE:    General appearance: Well appearing, in no acute distress, alert and oriented x3.  Psych:  Mood and affect appropriate.          ASSESSMENT: 43 y.o. year old male with axial low back pain, consistent with     1. Other spondylosis, lumbar region  Procedure Request Order for Pain Management      2. Spondylosis without myelopathy or radiculopathy              PLAN:     - I have stressed the importance of physical activity and a home exercise plan to help with pain and improve health.  - Patient can continue with medications for now since they are providing benefits, using them appropriately, and without side effects.  - Schedule for a Diagnostic Medial branch block at Left/ Right L3,4, and L5   -refill zanaflex  - Counseled patient regarding the importance of activity modification, constant sleeping habits, and physical therapy.    The above plan and management options were discussed at length with patient. Patient is in agreement with the above and verbalized understanding. It will be communicated with the referring physician via electronic record, fax, or mail.    Mateus Vargas  05/16/2024

## 2024-05-17 NOTE — H&P (VIEW-ONLY)
Chronic Pain-Tele-Medicine-Established Note (Follow up visit)      The patient location is: home  The chief complaint leading to consultation is: back pain   Visit type: Virtual visit with synchronous audio and video  Total time spent with patient: 20 min  Each patient to whom he or she provides medical services by telemedicine is:  (1) informed of the relationship between the physician and patient and the respective role of any other health care provider with respect to management of the patient; and (2) notified that he or she may decline to receive medical services by telemedicine and may withdraw from such care at any time.    Notes:     SUBJECTIVE:    Interval HPI 5/16/24  Jorge Bae III presents tele-medicine appointment for a follow-up appointment for worsening low axial back pain . Since the last visit, Jorge Bae III states the pain has been worsening. Current pain intensity is 8/10. Patient states this pain is different with no radiation down the leg and similar to his initial pain when he saw me on 10/2021 and had responded very well to facet joint injections.  He also has neck pain radiating down the hands with some numbness in his fingers but states his back pain is more severe and he would like to focus on the low back pain first      Interval HPI 6/19/23: Mr. Bae returns to the office for follow-up.  Today he reports worsening back pain that happened over the past few days.  He reports he was moving into a new house and was lifting boxes when the pain worsened.  He can get radiating pain down the left greater than right buttocks.   He is not having any new numbness or weakness in the lower extremities.  He does report over the past few months he has been waking up with some numbness into the 3rd and 4th digit left greater than right of his hands.     Interval HPI 9/9/22: Mr. Bae returns via telemedicine for follow-up.  He is status post bilateral L5-S1 TFESI on  08/16/2022.  When I previously saw him he had some weakness of the left EHL.  The weakness had improved during his neurosurgical follow-up and he continues to say today that he feels like he is having improvement in this.  He is not having any new or worsening radicular pain, numbness, or weakness.  He has been active with swimming and PT directed home exercise.     Interval History 4/18/2022:  Patient seen for virtual follow-up today.  He reports that his back pain has slowly started to return over the past couple months.  He has been doing some stretching and home exercises without significant improvement.  He is interested in repeating the facet joint injections as they gave him good relief for a few months last time. In addition to his low back pain, he is also having some right lateral hip pain in the area of his GTB.  He denies any new numbness, tingling, weakness, saddle anesthesia, or bowel/bladder dysfunction.     Interval History 2/21/2022:  Mr Bae presents virtually for complaint of lower back pain. He states overall doing fair today. He had exacerbation of pain and voiced concern that while it has improved with left over PO steroid he is going skiing in upcoming weeks. He continues to take baclofen in conjunction with Zanaflex. He denies SE of medications. Denies new areas of pain.      Interval history 12/20/21:  Jorge Bae III presents tele-medicine appointment for a follow-up appointment for low back pain. He is s/p bilateral L4/5 and L5/S1 facet joint injections with 100% relief for ~2 weeks. Since the last visit, Jorge Bae states the pain has been improving. Current pain intensity is 4/10. His low back pain is still present after those two weeks of complete relief but significantly better. He does endorse some lateral hip pain that feels tight, nonradaiting. It can be present on left, right, or both. Stretching and theragun help the tightness. His back pain is worsened by forward  "flexion and running.        Initial consult 10/28/2021:  Jorge Bae III presents to the clinic for the evaluation of low back pain. The pain started 2-3 years ago following incident of twisting while carrying heavy objects and symptoms have been worsening.The pain is located in the lower back area and radiates to bilateral buttocks area and back of the thighs.  The pain is described as aching, boring, sharp, shooting and tight band and is rated as 6/10. The pain is rated with a score of  4/10 on the BEST day and a score of 8/10 on the WORST day.  Symptoms interfere with daily activity and sleeping. The pain is exacerbated by Sitting, Standing, Walking, Lifting and Getting out of bed/chair.  The pain is mitigated by heat, ice and medications.  Patient states he had been playing golf and regularly was doing exercises his entire adult life and now his lower extremity pain is affecting his activities of daily living  Patient denies night fever/night sweats, urinary incontinence, bowel incontinence, significant weight loss, significant motor weakness and loss of sensations.    Pain Medications:    - Opioids: none  - Anti-Depressants: none  - Anti-Convulsants: none  - Others: Celebrex and Zanaflex      Physical Therapy/Home Exercise: yes  HE with limited benefit     report:  Reviewed and consistent with medication use as prescribed.    Pain Procedures:  -bilateral L4/5 and L5/S1 facet joint injections on 11/3/21 90% RELIEF   -L5-S1 on 07/22/2022 and reports 85-90% relief   - bilateral L5-S1 TFESI on 08/16/2022 with over 90% relief    Imaging:    MRI CERVICAL SPINE WITHOUT CONTRAST 4/24     CLINICAL HISTORY:  Myelopathy, acute, cervical spine;  Radiculopathy, cervical region. Left sided cervical pain for 2-3 months. No specific injury but pt states he moved his head a "certain way". He states it hurt enough to keep him up at time for a  while. It's better now but still having intermittent tingling of the left and " occasionally the right 3rd-5th fingers.     TECHNIQUE:  Multiplanar, multisequence MR images of the cervical spine were acquired without the administration of contrast.     COMPARISON:  None.     FINDINGS:  CORD: Normal size and signal.  No syrinx.  Cervicomedullary junction is normal.  Incidental small left foraminal perineural cyst at T1-2.     ALIGNMENT: Normal.  Lateral masses of C1 and C2 are congruent.     BONES: Vertebral body heights are maintained.  No aggressive bone marrow signal.     PARASPINAL AREA: Normal.     CERVICAL DISC LEVELS:     C2-C3: Mild disc osteophyte complex.  Moderate left facet hypertrophy.  Preserved ventral and dorsal CSF surrounding the cord.  Moderate-severe left and mild-moderate right foraminal stenosis.     C3-C4: Mild disc osteophyte complex.  Mild bilateral facet hypertrophy.  Preserved ventral and dorsal CSF surrounding the cord.  Mild bilateral foraminal stenosis.     C4-C5: Mild disc osteophyte complex, eccentric to the right.  Minimal right facet hypertrophy.  Slight ventral cord flattening with preserved ventral and dorsal CSF surrounding the cord.  Mild-moderate right foraminal stenosis.     C5-C6: Moderate disc osteophyte complex with right lateral recess soft disc extrusion.  Mild-moderate right ventral cord flattening with effacement of ventral and thin sliver preserved dorsal CSF surrounding the cord.  Moderate right and mild left foraminal stenosis.     C6-C7: Mild disc osteophyte complex with right paracentral likely soft disc extrusion.  Mild bilateral facet hypertrophy.  Slight ventral cord flattening with thin sliver preserved ventral and preserved dorsal CSF surrounding the cord.  Moderate right and mild-moderate left foraminal stenosis.     C7-T1: Mild disc osteophyte complex.  Mild left and minimal right facet hypertrophy.  Preserved ventral and dorsal CSF surrounding the cord.  Mild left foraminal stenosis.     Impression:     1. Multilevel spondylosis,  greatest at C5-6 where there is mild-moderate right cord flattening with some preserved CSF surrounding the cord and no cord signal abnormality.  2. Multilevel foraminal stenosis, greatest on the left at C2-3 where it is moderate-severe.  Moderate foraminal stenosis on the right at C5-6 and C6-7.  MRI LUMBAR SPINE WITHOUT CONTRAST 4/24     CLINICAL HISTORY:  Lumbar radiculopathy, no red flags, no prior management; radiculopathy, lumbar region.  Chronic low back, per patient bulging disk at L5-S1. When standing and putting pressure on the left leg the pain shoots from the low back into the left buttock and down the later left thigh. At times this makes his leg feel weak like it wants to give out.     TECHNIQUE:  Multiplanar, multisequence MR images were acquired from the thoracolumbar junction to the sacrum without the administration of contrast.     COMPARISON:  MRI lumbar spine without contrast, 08/13/2022.     FINDINGS:  NOMENCLATURE: Five lumbar type vertebral bodies.     CORD/CAUDA EQUINA: Conus has normal size and signal and ends at a normal level of L1-L2.     ALIGNMENT: Normal.     BONES: Vertebral body heights are maintained.  Prominent type 1 endplate changes at L5-S1.  No aggressive bone marrow signal.     PARASPINAL AREA: Normal.     LUMBAR DISC LEVELS:     T12-L1: No disc herniation or significant posterior osteophytic ridging.  No significant spinal canal or foraminal stenosis.     L1-L2: No disc herniation or significant posterior osteophytic ridging.  No significant spinal canal or foraminal stenosis.     L2-L3: No disc herniation or significant posterior osteophytic ridging.  Minimal right greater than left facet hypertrophy.  No significant spinal canal or foraminal stenosis.     L3-L4: Minimal disc bulge..  Mild right and minimal left facet hypertrophy.  Ligamentum flavum thickening.  Minimal narrowing of the bilateral lateral recesses.  No significant spinal canal or foraminal stenosis.     L4-L5:  Mild disc bulge with tiny central protrusion with high-intensity zone in the protrusion.  Slight disc height loss with loss of normal T2 signal in the disc.  Minimal bilateral facet hypertrophy.  Mild-moderate narrowing of the bilateral lateral recesses, unchanged.  No significant spinal canal stenosis.  Mild bilateral foraminal stenosis, unchanged.     L5-S1: Mild disc bulge with superimposed central extrusion migrating cranially to the infra pedicle level, similar in size compared to prior study.  Disc height loss and loss of normal T2 signal in the disc.  Mild-moderate narrowing of the bilateral lateral recesses.  No significant spinal canal stenosis.  Mild bilateral foraminal stenosis, unchanged.     Impression:     1. Multilevel spondylosis, similar to prior study from 08/13/2022, greatest at L4-5 and L5-S1 where there is mild-moderate narrowing of the lateral recesses without significant spinal canal stenosis.  2. Mild foraminal stenosis bilaterally at L4-5 and L5-S1.       MRI lumbar spine 11/9/21  FINDINGS:  The lumbar spine demonstrates proper alignment. The vertebral bodies show normal signal intensity and height with no indication of acute fracture or pathologic marrow replacement process.  Multilevel disc desiccation most prominent L5-S1.     The demonstrated portion of the spinal cord is normal in signal intensity at all levels with no indication of myelomalacia or cord edema. Conus terminates at L1.  Evaluation of the surrounding soft tissue structures demonstrates no acute abnormality.     Degenerative findings:     T12-L1: No significant spinal canal stenosis or neural foraminal narrowing.  L1-L2: No significant spinal canal stenosis or neural foraminal narrowing.  L2-L3: No significant spinal canal stenosis or neural foraminal narrowing.  L3-L4: Mild right facet arthropathy.  No canal or foraminal stenosis.  L4-L5: Disc bulge with central annular fissure.  Bilateral facet arthropathy.  No canal  stenosis.  Mild bilateral foraminal stenosis.  L5-S1: Disc bulge with central extrusion and cranial migration.  Mild canal stenosis.  Moderate bilateral foraminal stenosis.  Mild edema in the posterior aspect of the L5 inferior endplate.     MRI lumbar spine 8/13/22  FINDINGS:  The marrow signal is within normal limits.  The conus lies at L1 vertebral body level.  L1/2: There are no significant degenerative changes at this level.     L2/3: There are no significant degenerative changes at this level.  L3/4: There are no significant degenerative changes at this level.  L4/5: There is a posteriorly oriented disc protrusion with small annular tear.  L5/S1: There is a posteriorly oriented disc extrusion with moderate narrowing of bilateral neural foramina.       Past Medical History:   Diagnosis Date    Depression     Hematuria     Testosterone deficiency      Past Surgical History:   Procedure Laterality Date    deviated septum repair      EPIDURAL STEROID INJECTION INTO LUMBAR SPINE N/A 7/22/2022    Procedure: Injection-steroid-epidural-lumbar L5/S1;  Surgeon: Reno Yu MD;  Location: [facility];  Service: Pain Management;  Laterality: N/A;    ESOPHAGOGASTRODUODENOSCOPY N/A 11/11/2020    Procedure: EGD (ESOPHAGOGASTRODUODENOSCOPY);  Surgeon: Yunior Stevens MD;  Location: [facility];  Service: Endoscopy;  Laterality: N/A;    ESOPHAGOGASTRODUODENOSCOPY N/A 12/4/2023    Procedure: EGD (ESOPHAGOGASTRODUODENOSCOPY);  Surgeon: Mike Chambers MD;  Location: [facility];  Service: Gastroenterology;  Laterality: N/A;    INJECTION OF FACET JOINT Bilateral 11/3/2021    Procedure: INJECTION, FACET JOINT BILATERAL L4/5, L5/S1;  Surgeon: Mateus Vargas MD;  Location: [facility];  Service: Pain Management;  Laterality: Bilateral;    TRANSFORAMINAL EPIDURAL INJECTION OF STEROID Bilateral 8/16/2022    Procedure: Injection,steroid,epidural,transforaminal approach L5/S1;  Surgeon: Reno Yu MD;  Location: [facility];   Service: Pain Management;  Laterality: Bilateral;    TRANSFORAMINAL EPIDURAL INJECTION OF STEROID Bilateral 7/7/2023    Procedure: Injection,steroid,epidural,transforaminal approach;  Surgeon: Reno Yu MD;  Location: [facility];  Service: Pain Management;  Laterality: Bilateral;  L5/S1     Social History     Socioeconomic History    Marital status:    Tobacco Use    Smoking status: Never    Smokeless tobacco: Never   Substance and Sexual Activity    Alcohol use: Yes     Alcohol/week: 10.0 standard drinks of alcohol     Types: 10 Glasses of wine per week     Comment: stopped in 2021, 4-6 times per week, 1/2 wine bottle per night    Drug use: Not Currently     Types: Amphetamines    Sexual activity: Yes     Partners: Female     Birth control/protection: None     Comment: partner has Mirena   Social History Narrative    January 4    Zia 16 mos    Works as .     Social Determinants of Health     Financial Resource Strain: Low Risk  (4/23/2024)    Overall Financial Resource Strain (CARDIA)     Difficulty of Paying Living Expenses: Not hard at all   Food Insecurity: No Food Insecurity (4/23/2024)    Hunger Vital Sign     Worried About Running Out of Food in the Last Year: Never true     Ran Out of Food in the Last Year: Never true   Transportation Needs: Unmet Transportation Needs (4/23/2024)    PRAPARE - Transportation     Lack of Transportation (Medical): Yes     Lack of Transportation (Non-Medical): No   Physical Activity: Sufficiently Active (4/23/2024)    Exercise Vital Sign     Days of Exercise per Week: 7 days     Minutes of Exercise per Session: 70 min   Stress: No Stress Concern Present (4/23/2024)    [employer] - Occupational Stress Questionnaire     Feeling of Stress : Only a little   Housing Stability: Low Risk  (2/4/2022)    Housing Stability Vital Sign     Unable to Pay for Housing in the Last Year: No     Number of Places Lived in the Last Year: 1      Unstable Housing in the Last Year: No     Family History   Problem Relation Name Age of Onset    Alzheimer's disease Mother Mirela Bae     Early death Mother Mirela Bae     No Known Problems Father      Heart disease Maternal Grandfather Roge Do     Glaucoma Neg Hx      Macular degeneration Neg Hx      Retinal detachment Neg Hx         Review of patient's allergies indicates:  No Known Allergies    Current Outpatient Medications   Medication Sig    ALPRAZolam (XANAX) 1 MG tablet TAKE 1 TABLET BY MOUTH 1 HOUR PRIOR TO APPOINTMENT. BRING BOTTLE WITH YOU TO APPOINTMENT. MUST HAVE .    atomoxetine (STRATTERA) 25 MG capsule Take 25 mg by mouth 2 (two) times daily.    baclofen (LIORESAL) 20 MG tablet Take 20 mg by mouth 3 (three) times daily.    buPROPion (WELLBUTRIN XL) 300 MG 24 hr tablet Take 300 mg by mouth once daily.    celecoxib (CELEBREX) 200 MG capsule Take 1 capsule (200 mg total) by mouth daily as needed for Pain.    esomeprazole (NEXIUM) 40 MG capsule TAKE 1 CAPSULE BY MOUTH EVERY DAY    methocarbamoL (ROBAXIN) 750 MG Tab Take 1 tablet (750 mg total) by mouth 4 (four) times daily as needed.    ondansetron (ZOFRAN-ODT) 4 MG TbDL Take 4 mg by mouth every 6 (six) hours as needed.    testosterone cypionate (DEPOTESTOTERONE CYPIONATE) 200 mg/mL injection Inject 150 mg into the muscle every 7 days.    tiZANidine (ZANAFLEX) 4 MG tablet Take 1 tablet (4 mg total) by mouth nightly as needed (muscle spasms).     No current facility-administered medications for this visit.       REVIEW OF SYSTEMS:    GENERAL:  No weight loss, malaise or fevers.  HEENT:   No recent changes in vision or hearing  NECK:  Negative for lumps, no difficulty with swallowing.  RESPIRATORY:  Negative for cough, wheezing or shortness of breath, patient denies any recent URI.  CARDIOVASCULAR:  Negative for chest pain, leg swelling or palpitations.  GI:  Negative for abdominal discomfort, blood in stools or black stools or change in  bowel habits.  MUSCULOSKELETAL:  See HPI.  SKIN:  Negative for lesions, rash, and itching.  PSYCH:  No mood disorder or recent psychosocial stressors.  Patients sleep is not disturbed secondary to pain.  HEMATOLOGY/LYMPHOLOGY:  Negative for prolonged bleeding, bruising easily or swollen nodes.  Patient is not currently taking any anti-coagulants  NEURO:   No history of headaches, syncope, paralysis, seizures or tremors.  All other reviewed and negative other than HPI.    OBJECTIVE:    General appearance: Well appearing, in no acute distress, alert and oriented x3.  Psych:  Mood and affect appropriate.          ASSESSMENT: 43 y.o. year old male with axial low back pain, consistent with     1. Other spondylosis, lumbar region  Procedure Request Order for Pain Management      2. Spondylosis without myelopathy or radiculopathy              PLAN:     - I have stressed the importance of physical activity and a home exercise plan to help with pain and improve health.  - Patient can continue with medications for now since they are providing benefits, using them appropriately, and without side effects.  - Schedule for a Diagnostic Medial branch block at Left/ Right L3,4, and L5   -refill zanaflex  - Counseled patient regarding the importance of activity modification, constant sleeping habits, and physical therapy.    The above plan and management options were discussed at length with patient. Patient is in agreement with the above and verbalized understanding. It will be communicated with the referring physician via electronic record, fax, or mail.    Mateus Vargas  05/16/2024

## 2024-05-20 ENCOUNTER — PATIENT MESSAGE (OUTPATIENT)
Dept: OTOLARYNGOLOGY | Facility: CLINIC | Age: 44
End: 2024-05-20
Payer: COMMERCIAL

## 2024-05-21 RX ORDER — AZELASTINE 1 MG/ML
1 SPRAY, METERED NASAL 2 TIMES DAILY
Qty: 30 ML | Refills: 11 | Status: SHIPPED | OUTPATIENT
Start: 2024-05-21 | End: 2025-05-21

## 2024-05-21 RX ORDER — FLUTICASONE PROPIONATE 50 MCG
2 SPRAY, SUSPENSION (ML) NASAL 2 TIMES DAILY
Qty: 16 G | Refills: 11 | Status: SHIPPED | OUTPATIENT
Start: 2024-05-21

## 2024-06-12 ENCOUNTER — HOSPITAL ENCOUNTER (OUTPATIENT)
Facility: OTHER | Age: 44
Discharge: HOME | End: 2024-06-12
Attending: ANESTHESIOLOGY | Admitting: ANESTHESIOLOGY
Payer: COMMERCIAL

## 2024-06-12 VITALS
BODY MASS INDEX: 26.66 KG/M2 | TEMPERATURE: 98 F | OXYGEN SATURATION: 98 % | WEIGHT: 180 LBS | HEART RATE: 83 BPM | SYSTOLIC BLOOD PRESSURE: 138 MMHG | RESPIRATION RATE: 16 BRPM | HEIGHT: 69 IN | DIASTOLIC BLOOD PRESSURE: 86 MMHG

## 2024-06-12 DIAGNOSIS — M47.817 SPONDYLOSIS OF LUMBOSACRAL REGION, UNSPECIFIED SPINAL OSTEOARTHRITIS COMPLICATION STATUS: Primary | ICD-10-CM

## 2024-06-12 DIAGNOSIS — G89.29 CHRONIC PAIN: ICD-10-CM

## 2024-06-12 PROCEDURE — 64494 INJ PARAVERT F JNT L/S 2 LEV: CPT | Mod: 50,,, | Performed by: ANESTHESIOLOGY

## 2024-06-12 PROCEDURE — 64493 INJ PARAVERT F JNT L/S 1 LEV: CPT | Mod: 50 | Performed by: ANESTHESIOLOGY

## 2024-06-12 PROCEDURE — 64494 INJ PARAVERT F JNT L/S 2 LEV: CPT | Mod: 50 | Performed by: ANESTHESIOLOGY

## 2024-06-12 PROCEDURE — 64493 INJ PARAVERT F JNT L/S 1 LEV: CPT | Mod: 50,,, | Performed by: ANESTHESIOLOGY

## 2024-06-12 PROCEDURE — 63600175 PHARM REV CODE 636 W HCPCS: Mod: JZ,JG | Performed by: ANESTHESIOLOGY

## 2024-06-12 PROCEDURE — 25000003 PHARM REV CODE 250: Performed by: ANESTHESIOLOGY

## 2024-06-12 RX ORDER — LIDOCAINE HYDROCHLORIDE 20 MG/ML
INJECTION, SOLUTION INFILTRATION; PERINEURAL
Status: DISCONTINUED | OUTPATIENT
Start: 2024-06-12 | End: 2024-06-12 | Stop reason: HOSPADM

## 2024-06-12 RX ORDER — SODIUM CHLORIDE 9 MG/ML
INJECTION, SOLUTION INTRAVENOUS CONTINUOUS
Status: DISCONTINUED | OUTPATIENT
Start: 2024-06-12 | End: 2024-06-12 | Stop reason: HOSPADM

## 2024-06-12 RX ORDER — BUPIVACAINE HYDROCHLORIDE 2.5 MG/ML
INJECTION, SOLUTION EPIDURAL; INFILTRATION; INTRACAUDAL
Status: DISCONTINUED | OUTPATIENT
Start: 2024-06-12 | End: 2024-06-12 | Stop reason: HOSPADM

## 2024-06-12 NOTE — DISCHARGE SUMMARY
Discharge Note  Short Stay      SUMMARY     Admit Date: 6/12/2024    Attending Physician: Mateus Vargas MD    Discharge Physician: Mateus Vargas MD      Discharge Date: 6/12/2024 3:07 PM    Procedure(s) (LRB):  BLOCK, NERVE BILATERAL L3, 4, 5 MEDIAL BRANCH (Bilateral)    Final Diagnosis: Other spondylosis, lumbar region [M47.896]    Disposition: Home or self care    Patient Instructions:   Current Discharge Medication List        CONTINUE these medications which have NOT CHANGED    Details   ALPRAZolam (XANAX) 1 MG tablet TAKE 1 TABLET BY MOUTH 1 HOUR PRIOR TO APPOINTMENT. BRING BOTTLE WITH YOU TO APPOINTMENT. MUST HAVE .  Qty: 4 tablet, Refills: 0      atomoxetine (STRATTERA) 25 MG capsule Take 25 mg by mouth 2 (two) times daily.      azelastine (ASTELIN) 137 mcg (0.1 %) nasal spray 1 spray (137 mcg total) by Nasal route 2 (two) times daily.  Qty: 30 mL, Refills: 11      baclofen (LIORESAL) 20 MG tablet Take 20 mg by mouth 3 (three) times daily.      buPROPion (WELLBUTRIN XL) 300 MG 24 hr tablet Take 300 mg by mouth once daily.      celecoxib (CELEBREX) 200 MG capsule Take 1 capsule (200 mg total) by mouth daily as needed for Pain.  Qty: 40 capsule, Refills: 1      esomeprazole (NEXIUM) 40 MG capsule TAKE 1 CAPSULE BY MOUTH EVERY DAY  Qty: 90 capsule, Refills: 1      fluticasone propionate (FLONASE) 50 mcg/actuation nasal spray 2 sprays (100 mcg total) by Each Nostril route 2 (two) times a day.  Qty: 16 g, Refills: 11      methocarbamoL (ROBAXIN) 750 MG Tab Take 1 tablet (750 mg total) by mouth 4 (four) times daily as needed.  Qty: 44 tablet, Refills: 3      ondansetron (ZOFRAN-ODT) 4 MG TbDL Take 4 mg by mouth every 6 (six) hours as needed.      testosterone cypionate (DEPOTESTOTERONE CYPIONATE) 200 mg/mL injection Inject 150 mg into the muscle every 7 days.      tiZANidine (ZANAFLEX) 4 MG tablet Take 1 tablet (4 mg total) by mouth nightly as needed (muscle spasms).  Qty: 40 tablet, Refills: 1                  Discharge Diagnosis: Other spondylosis, lumbar region [M47.896]  Condition on Discharge: Stable with no complications to procedure   Diet on Discharge: Same as before.  Activity: as per instruction sheet.  Discharge to: Home with a responsible adult.  Follow up: 2-4 weeks       Please call my office or pager at [phone] if experienced any weakness or loss of sensation, fever > 101.5, pain uncontrolled with oral medications, persistent nausea/vomiting/or diarrhea, redness or drainage from the incisions, or any other worrisome concerns. If physician on call was not reached or could not communicate with our office for any reason please go to the nearest emergency department     Hector Soares MD

## 2024-06-12 NOTE — DISCHARGE INSTRUCTIONS

## 2024-06-18 RX ORDER — TIZANIDINE 4 MG/1
TABLET ORAL
Qty: 90 TABLET | Refills: 1 | Status: SHIPPED | OUTPATIENT
Start: 2024-06-18

## 2024-06-20 ENCOUNTER — OFFICE VISIT (OUTPATIENT)
Dept: PAIN MEDICINE | Facility: CLINIC | Age: 44
End: 2024-06-20
Attending: ANESTHESIOLOGY
Payer: COMMERCIAL

## 2024-06-20 DIAGNOSIS — M47.816 LUMBAR SPONDYLOSIS: ICD-10-CM

## 2024-06-20 DIAGNOSIS — M51.36 DISCOGENIC LOW BACK PAIN: ICD-10-CM

## 2024-06-20 DIAGNOSIS — M79.18 MYOFASCIAL PAIN SYNDROME: Primary | ICD-10-CM

## 2024-06-20 DIAGNOSIS — M53.3 SACROILIAC JOINT PAIN: ICD-10-CM

## 2024-06-20 PROCEDURE — 1159F MED LIST DOCD IN RCRD: CPT | Mod: CPTII,95,, | Performed by: ANESTHESIOLOGY

## 2024-06-20 PROCEDURE — 99214 OFFICE O/P EST MOD 30 MIN: CPT | Mod: 95,,, | Performed by: ANESTHESIOLOGY

## 2024-06-20 PROCEDURE — 1160F RVW MEDS BY RX/DR IN RCRD: CPT | Mod: CPTII,95,, | Performed by: ANESTHESIOLOGY

## 2024-06-20 NOTE — PROGRESS NOTES
Chronic Pain Medicine - Established Note (Follow up visit)      SUBJECTIVE:  Interval HPI 6/20/24  Jorge Bae III returns today for follow-up of his axial low back pain. He underwent BL L3, 4, and 5 MBB on 6/12/24. He reports 0% reduction in pain. Last visit, we also refilled his zanaflex. He additionally takes celebrex 200mg daily prn. He takes baclofen 20mg most evenings and some mornings. He reports some pain relief with zanflex. He is unsure if he has done trigger point injections. He had diminishing return with epidurals but feels that facet injection in 2021 worked better. Pain is currently localized to left low back/buttock without radiation down the leg. Denies right sided pain currently, but he will sometimes have right sided low back pain. Pain is worse with weight bearing, especially related to golfing and walking. Pain interfering with sleep. Pain is improved by massage. Pain ranges from 4-8/10, currently 4/10. Previously completed healthy back and does HEP daily.     Interval HPI 5/16/24  Jorge Bae III presents tele-medicine appointment for a follow-up appointment for worsening low axial back pain . Since the last visit, Jorge Bae III states the pain has been worsening. Current pain intensity is 8/10. Patient states this pain is different with no radiation down the leg and similar to his initial pain when he saw me on 10/2021 and had responded very well to facet joint injections.  He also has neck pain radiating down the hands with some numbness in his fingers but states his back pain is more severe and he would like to focus on the low back pain first      Interval HPI 6/19/23: Mr. Bae returns to the office for follow-up.  Today he reports worsening back pain that happened over the past few days.  He reports he was moving into a new house and was lifting boxes when the pain worsened.  He can get radiating pain down the left greater than right buttocks.   He is not  having any new numbness or weakness in the lower extremities.  He does report over the past few months he has been waking up with some numbness into the 3rd and 4th digit left greater than right of his hands.     Interval HPI 9/9/22: Mr. Bae returns via telemedicine for follow-up.  He is status post bilateral L5-S1 TFESI on 08/16/2022.  When I previously saw him he had some weakness of the left EHL.  The weakness had improved during his neurosurgical follow-up and he continues to say today that he feels like he is having improvement in this.  He is not having any new or worsening radicular pain, numbness, or weakness.  He has been active with swimming and PT directed home exercise.     Interval History 4/18/2022:  Patient seen for virtual follow-up today.  He reports that his back pain has slowly started to return over the past couple months.  He has been doing some stretching and home exercises without significant improvement.  He is interested in repeating the facet joint injections as they gave him good relief for a few months last time. In addition to his low back pain, he is also having some right lateral hip pain in the area of his GTB.  He denies any new numbness, tingling, weakness, saddle anesthesia, or bowel/bladder dysfunction.     Interval History 2/21/2022:  Mr Bae presents virtually for complaint of lower back pain. He states overall doing fair today. He had exacerbation of pain and voiced concern that while it has improved with left over PO steroid he is going skiing in upcoming weeks. He continues to take baclofen in conjunction with Zanaflex. He denies SE of medications. Denies new areas of pain.      Interval history 12/20/21:  Jorge Bae III presents tele-medicine appointment for a follow-up appointment for low back pain. He is s/p bilateral L4/5 and L5/S1 facet joint injections with 100% relief for ~2 weeks. Since the last visit, Jorge Bae states the pain has been improving.  Current pain intensity is 4/10. His low back pain is still present after those two weeks of complete relief but significantly better. He does endorse some lateral hip pain that feels tight, nonradaiting. It can be present on left, right, or both. Stretching and theragun help the tightness. His back pain is worsened by forward flexion and running.        Initial consult 10/28/2021:  Jorge Bae III presents to the clinic for the evaluation of low back pain. The pain started 2-3 years ago following incident of twisting while carrying heavy objects and symptoms have been worsening.The pain is located in the lower back area and radiates to bilateral buttocks area and back of the thighs.  The pain is described as aching, boring, sharp, shooting and tight band and is rated as 6/10. The pain is rated with a score of  4/10 on the BEST day and a score of 8/10 on the WORST day.  Symptoms interfere with daily activity and sleeping. The pain is exacerbated by Sitting, Standing, Walking, Lifting and Getting out of bed/chair.  The pain is mitigated by heat, ice and medications.  Patient states he had been playing golf and regularly was doing exercises his entire adult life and now his lower extremity pain is affecting his activities of daily living  Patient denies night fever/night sweats, urinary incontinence, bowel incontinence, significant weight loss, significant motor weakness and loss of sensations.    Pain Medications:    - Opioids: none  - Anti-Depressants: none  - Anti-Convulsants: none  - Others: Celebrex and Zanaflex      Physical Therapy/Home Exercise: yes  HE with limited benefit     report:  Reviewed and consistent with medication use as prescribed.    Pain Procedures:  -bilateral L4/5 and L5/S1 facet joint injections on 11/3/21 90% RELIEF   -L5-S1 on 07/22/2022 and reports 85-90% relief   - bilateral L5-S1 TFESI on 08/16/2022 with over 90% relief    Imaging:    MRI CERVICAL SPINE WITHOUT CONTRAST 4/24    "  CLINICAL HISTORY:  Myelopathy, acute, cervical spine;  Radiculopathy, cervical region. Left sided cervical pain for 2-3 months. No specific injury but pt states he moved his head a "certain way". He states it hurt enough to keep him up at time for a  while. It's better now but still having intermittent tingling of the left and occasionally the right 3rd-5th fingers.     TECHNIQUE:  Multiplanar, multisequence MR images of the cervical spine were acquired without the administration of contrast.     COMPARISON:  None.     FINDINGS:  CORD: Normal size and signal.  No syrinx.  Cervicomedullary junction is normal.  Incidental small left foraminal perineural cyst at T1-2.     ALIGNMENT: Normal.  Lateral masses of C1 and C2 are congruent.     BONES: Vertebral body heights are maintained.  No aggressive bone marrow signal.     PARASPINAL AREA: Normal.     CERVICAL DISC LEVELS:     C2-C3: Mild disc osteophyte complex.  Moderate left facet hypertrophy.  Preserved ventral and dorsal CSF surrounding the cord.  Moderate-severe left and mild-moderate right foraminal stenosis.     C3-C4: Mild disc osteophyte complex.  Mild bilateral facet hypertrophy.  Preserved ventral and dorsal CSF surrounding the cord.  Mild bilateral foraminal stenosis.     C4-C5: Mild disc osteophyte complex, eccentric to the right.  Minimal right facet hypertrophy.  Slight ventral cord flattening with preserved ventral and dorsal CSF surrounding the cord.  Mild-moderate right foraminal stenosis.     C5-C6: Moderate disc osteophyte complex with right lateral recess soft disc extrusion.  Mild-moderate right ventral cord flattening with effacement of ventral and thin sliver preserved dorsal CSF surrounding the cord.  Moderate right and mild left foraminal stenosis.     C6-C7: Mild disc osteophyte complex with right paracentral likely soft disc extrusion.  Mild bilateral facet hypertrophy.  Slight ventral cord flattening with thin sliver preserved ventral and " preserved dorsal CSF surrounding the cord.  Moderate right and mild-moderate left foraminal stenosis.     C7-T1: Mild disc osteophyte complex.  Mild left and minimal right facet hypertrophy.  Preserved ventral and dorsal CSF surrounding the cord.  Mild left foraminal stenosis.     Impression:     1. Multilevel spondylosis, greatest at C5-6 where there is mild-moderate right cord flattening with some preserved CSF surrounding the cord and no cord signal abnormality.  2. Multilevel foraminal stenosis, greatest on the left at C2-3 where it is moderate-severe.  Moderate foraminal stenosis on the right at C5-6 and C6-7.  MRI LUMBAR SPINE WITHOUT CONTRAST 4/24     CLINICAL HISTORY:  Lumbar radiculopathy, no red flags, no prior management; radiculopathy, lumbar region.  Chronic low back, per patient bulging disk at L5-S1. When standing and putting pressure on the left leg the pain shoots from the low back into the left buttock and down the later left thigh. At times this makes his leg feel weak like it wants to give out.     TECHNIQUE:  Multiplanar, multisequence MR images were acquired from the thoracolumbar junction to the sacrum without the administration of contrast.     COMPARISON:  MRI lumbar spine without contrast, 08/13/2022.     FINDINGS:  NOMENCLATURE: Five lumbar type vertebral bodies.     CORD/CAUDA EQUINA: Conus has normal size and signal and ends at a normal level of L1-L2.     ALIGNMENT: Normal.     BONES: Vertebral body heights are maintained.  Prominent type 1 endplate changes at L5-S1.  No aggressive bone marrow signal.     PARASPINAL AREA: Normal.     LUMBAR DISC LEVELS:     T12-L1: No disc herniation or significant posterior osteophytic ridging.  No significant spinal canal or foraminal stenosis.     L1-L2: No disc herniation or significant posterior osteophytic ridging.  No significant spinal canal or foraminal stenosis.     L2-L3: No disc herniation or significant posterior osteophytic ridging.   Minimal right greater than left facet hypertrophy.  No significant spinal canal or foraminal stenosis.     L3-L4: Minimal disc bulge..  Mild right and minimal left facet hypertrophy.  Ligamentum flavum thickening.  Minimal narrowing of the bilateral lateral recesses.  No significant spinal canal or foraminal stenosis.     L4-L5: Mild disc bulge with tiny central protrusion with high-intensity zone in the protrusion.  Slight disc height loss with loss of normal T2 signal in the disc.  Minimal bilateral facet hypertrophy.  Mild-moderate narrowing of the bilateral lateral recesses, unchanged.  No significant spinal canal stenosis.  Mild bilateral foraminal stenosis, unchanged.     L5-S1: Mild disc bulge with superimposed central extrusion migrating cranially to the infra pedicle level, similar in size compared to prior study.  Disc height loss and loss of normal T2 signal in the disc.  Mild-moderate narrowing of the bilateral lateral recesses.  No significant spinal canal stenosis.  Mild bilateral foraminal stenosis, unchanged.     Impression:     1. Multilevel spondylosis, similar to prior study from 08/13/2022, greatest at L4-5 and L5-S1 where there is mild-moderate narrowing of the lateral recesses without significant spinal canal stenosis.  2. Mild foraminal stenosis bilaterally at L4-5 and L5-S1.       MRI lumbar spine 11/9/21  FINDINGS:  The lumbar spine demonstrates proper alignment. The vertebral bodies show normal signal intensity and height with no indication of acute fracture or pathologic marrow replacement process.  Multilevel disc desiccation most prominent L5-S1.     The demonstrated portion of the spinal cord is normal in signal intensity at all levels with no indication of myelomalacia or cord edema. Conus terminates at L1.  Evaluation of the surrounding soft tissue structures demonstrates no acute abnormality.     Degenerative findings:     T12-L1: No significant spinal canal stenosis or neural  foraminal narrowing.  L1-L2: No significant spinal canal stenosis or neural foraminal narrowing.  L2-L3: No significant spinal canal stenosis or neural foraminal narrowing.  L3-L4: Mild right facet arthropathy.  No canal or foraminal stenosis.  L4-L5: Disc bulge with central annular fissure.  Bilateral facet arthropathy.  No canal stenosis.  Mild bilateral foraminal stenosis.  L5-S1: Disc bulge with central extrusion and cranial migration.  Mild canal stenosis.  Moderate bilateral foraminal stenosis.  Mild edema in the posterior aspect of the L5 inferior endplate.     MRI lumbar spine 8/13/22  FINDINGS:  The marrow signal is within normal limits.  The conus lies at L1 vertebral body level.  L1/2: There are no significant degenerative changes at this level.     L2/3: There are no significant degenerative changes at this level.  L3/4: There are no significant degenerative changes at this level.  L4/5: There is a posteriorly oriented disc protrusion with small annular tear.  L5/S1: There is a posteriorly oriented disc extrusion with moderate narrowing of bilateral neural foramina.       Past Medical History:   Diagnosis Date    Depression     Hematuria     Testosterone deficiency      Past Surgical History:   Procedure Laterality Date    deviated septum repair      EPIDURAL STEROID INJECTION INTO LUMBAR SPINE N/A 7/22/2022    Procedure: Injection-steroid-epidural-lumbar L5/S1;  Surgeon: Reno Yu MD;  Location: [facility] OR;  Service: Pain Management;  Laterality: N/A;    ESOPHAGOGASTRODUODENOSCOPY N/A 11/11/2020    Procedure: EGD (ESOPHAGOGASTRODUODENOSCOPY);  Surgeon: Yunior Stevens MD;  Location: [facility] ENDO;  Service: Endoscopy;  Laterality: N/A;    ESOPHAGOGASTRODUODENOSCOPY N/A 12/4/2023    Procedure: EGD (ESOPHAGOGASTRODUODENOSCOPY);  Surgeon: Mike Chambers MD;  Location: [facility] ENDO;  Service: Gastroenterology;  Laterality: N/A;    INJECTION OF ANESTHETIC AGENT AROUND NERVE Bilateral 6/12/2024     Procedure: BLOCK, NERVE BILATERAL L3, 4, 5 MEDIAL BRANCH;  Surgeon: Mateus Vargas MD;  Location: [facility] PAIN MGT;  Service: Pain Management;  Laterality: Bilateral;  [phone]    INJECTION OF FACET JOINT Bilateral 11/3/2021    Procedure: INJECTION, FACET JOINT BILATERAL L4/5, L5/S1;  Surgeon: Mateus Vargas MD;  Location: [facility] PAIN MGT;  Service: Pain Management;  Laterality: Bilateral;    TRANSFORAMINAL EPIDURAL INJECTION OF STEROID Bilateral 8/16/2022    Procedure: Injection,steroid,epidural,transforaminal approach L5/S1;  Surgeon: Reno Yu MD;  Location: [facility] OR;  Service: Pain Management;  Laterality: Bilateral;    TRANSFORAMINAL EPIDURAL INJECTION OF STEROID Bilateral 7/7/2023    Procedure: Injection,steroid,epidural,transforaminal approach;  Surgeon: Reno Yu MD;  Location: [facility] OR;  Service: Pain Management;  Laterality: Bilateral;  L5/S1     Social History     Socioeconomic History    Marital status:    Tobacco Use    Smoking status: Never    Smokeless tobacco: Never   Substance and Sexual Activity    Alcohol use: Yes     Alcohol/week: 10.0 standard drinks of alcohol     Types: 10 Glasses of wine per week     Comment: stopped in 2021, 4-6 times per week, 1/2 wine bottle per night    Drug use: Not Currently     Types: Amphetamines    Sexual activity: Yes     Partners: Female     Birth control/protection: None     Comment: partner has Mirena   Social History Narrative    January 4    Zia 16 mos    Works as .     Social Determinants of Health     Financial Resource Strain: Low Risk  (4/23/2024)    Overall Financial Resource Strain (CARDIA)     Difficulty of Paying Living Expenses: Not hard at all   Food Insecurity: No Food Insecurity (4/23/2024)    Hunger Vital Sign     Worried About Running Out of Food in the Last Year: Never true     Ran Out of Food in the Last Year: Never true   Transportation Needs: Unmet Transportation Needs (4/23/2024)    PRAPARE -  Transportation     Lack of Transportation (Medical): Yes     Lack of Transportation (Non-Medical): No   Physical Activity: Sufficiently Active (4/23/2024)    Exercise Vital Sign     Days of Exercise per Week: 7 days     Minutes of Exercise per Session: 70 min   Stress: No Stress Concern Present (4/23/2024)    [employer] - Occupational Stress Questionnaire     Feeling of Stress : Only a little   Housing Stability: Low Risk  (2/4/2022)    Housing Stability Vital Sign     Unable to Pay for Housing in the Last Year: No     Number of Places Lived in the Last Year: 1     Unstable Housing in the Last Year: No     Family History   Problem Relation Name Age of Onset    Alzheimer's disease Mother Mirela Bae     Early death Mother Mirela Bae     No Known Problems Father      Heart disease Maternal Grandfather Roge Do     Glaucoma Neg Hx      Macular degeneration Neg Hx      Retinal detachment Neg Hx         Review of patient's allergies indicates:  No Known Allergies    Current Outpatient Medications   Medication Sig    atomoxetine (STRATTERA) 25 MG capsule Take 25 mg by mouth 2 (two) times daily.    azelastine (ASTELIN) 137 mcg (0.1 %) nasal spray 1 spray (137 mcg total) by Nasal route 2 (two) times daily.    baclofen (LIORESAL) 20 MG tablet Take 20 mg by mouth 3 (three) times daily.    buPROPion (WELLBUTRIN XL) 300 MG 24 hr tablet Take 300 mg by mouth once daily.    celecoxib (CELEBREX) 200 MG capsule Take 1 capsule (200 mg total) by mouth daily as needed for Pain.    esomeprazole (NEXIUM) 40 MG capsule TAKE 1 CAPSULE BY MOUTH EVERY DAY    fluticasone propionate (FLONASE) 50 mcg/actuation nasal spray 2 sprays (100 mcg total) by Each Nostril route 2 (two) times a day.    methocarbamoL (ROBAXIN) 750 MG Tab Take 1 tablet (750 mg total) by mouth 4 (four) times daily as needed.    ondansetron (ZOFRAN-ODT) 4 MG TbDL Take 4 mg by mouth every 6 (six) hours as needed.    testosterone  cypionate (DEPOTESTOTERONE CYPIONATE) 200 mg/mL injection Inject 150 mg into the muscle every 7 days.    tiZANidine (ZANAFLEX) 4 MG tablet TAKE 1 TABLET BY MOUTH EVERY DAY IN THE EVENING AS NEEDED FOR MUSCLE SPASMS     No current facility-administered medications for this visit.       REVIEW OF SYSTEMS:    GENERAL:  No weight loss, malaise or fevers.  HEENT:   No recent changes in vision or hearing  NECK:  Negative for lumps, no difficulty with swallowing.  RESPIRATORY:  Negative for cough, wheezing or shortness of breath, patient denies any recent URI.  CARDIOVASCULAR:  Negative for chest pain, leg swelling or palpitations.  GI:  Negative for abdominal discomfort, blood in stools or black stools or change in bowel habits.  MUSCULOSKELETAL:  See HPI.  SKIN:  Negative for lesions, rash, and itching.  PSYCH:  No mood disorder or recent psychosocial stressors.  Patients sleep is not disturbed secondary to pain.  HEMATOLOGY/LYMPHOLOGY:  Negative for prolonged bleeding, bruising easily or swollen nodes.  Patient is not currently taking any anti-coagulants  NEURO:   No history of headaches, syncope, paralysis, seizures or tremors.  All other reviewed and negative other than HPI.    OBJECTIVE:  Virtual Visit PEx  General appearance: Well appearing, in no acute distress, alert and oriented x3.  Psych:  Mood and affect appropriate.          ASSESSMENT: 43 y.o. year old male with axial low back pain, consistent with     1. Myofascial pain syndrome        2. Lumbar spondylosis        3. Discogenic low back pain        4. Sacroiliac joint pain                PLAN:   - I have stressed the importance of physical activity and a home exercise plan to help with pain and improve health. Continue Healthy back HEP  - Patient can continue with medications for now since they are providing benefits, using them appropriately, and without side effects.  - Counseled patient regarding the importance of activity modification and physical  therapy.  - Continue zanaflex 4mg PRN (nightly) and baclofen 20mg TID PRN (1-2x./day)  - Cymbalta 30mg daily  - Return to clinic for in person evaluation and likely trigger point injections for myofascial component of pain. Patient experiences relief with muscle relaxers and will likely improve with TPIs.   - Evaluate for left sided SI mediated pain as the location he describes sounds like SI region.   - Consider Via disc if no SI findings on exam  - RTC in 2-4 weeks to assess response to cymablta and perform in person exam      The above plan and management options were discussed at length with patient. Patient is in agreement with the above and verbalized understanding. It will be communicated with the referring physician via electronic record, fax, or mail.    Nishant Cabello MD  [employer] PM&R Resident   I have personally reviewed the history and personally discussed the plan with patient through video visit and agree with the resident/fellow/NPs note as stated above.    Mateus Vargas MD  6/20/24

## 2024-06-25 NOTE — PROGRESS NOTES
Chief Complaint   Patient presents with    Left Shoulder - Pain       HPI:    This is a 43 y.o. who presents today complaining of left shoulder, neck and back pain for 2 weeks after playing golf. He notes  weakness. Pain is tingling. No associated signs or symptoms.      Past Medical History:   Diagnosis Date    Depression     Hematuria     Testosterone deficiency       Past Surgical History:   Procedure Laterality Date    deviated septum repair      EPIDURAL STEROID INJECTION INTO LUMBAR SPINE N/A 7/22/2022    Procedure: Injection-steroid-epidural-lumbar L5/S1;  Surgeon: Reno Yu MD;  Location: [facility] OR;  Service: Pain Management;  Laterality: N/A;    ESOPHAGOGASTRODUODENOSCOPY N/A 11/11/2020    Procedure: EGD (ESOPHAGOGASTRODUODENOSCOPY);  Surgeon: Yunior Stevens MD;  Location: [facility];  Service: Endoscopy;  Laterality: N/A;    ESOPHAGOGASTRODUODENOSCOPY N/A 12/4/2023    Procedure: EGD (ESOPHAGOGASTRODUODENOSCOPY);  Surgeon: Mike Chambers MD;  Location: [facility];  Service: Gastroenterology;  Laterality: N/A;    INJECTION OF FACET JOINT Bilateral 11/3/2021    Procedure: INJECTION, FACET JOINT BILATERAL L4/5, L5/S1;  Surgeon: Mateus Vargas MD;  Location: [facility];  Service: Pain Management;  Laterality: Bilateral;    TRANSFORAMINAL EPIDURAL INJECTION OF STEROID Bilateral 8/16/2022    Procedure: Injection,steroid,epidural,transforaminal approach L5/S1;  Surgeon: Reno Yu MD;  Location: [facility] OR;  Service: Pain Management;  Laterality: Bilateral;    TRANSFORAMINAL EPIDURAL INJECTION OF STEROID Bilateral 7/7/2023    Procedure: Injection,steroid,epidural,transforaminal approach;  Surgeon: Reno Yu MD;  Location: [facility] OR;  Service: Pain Management;  Laterality: Bilateral;  L5/S1      Current Outpatient Medications on File Prior to Visit   Medication Sig Dispense Refill    atomoxetine (STRATTERA) 25 MG capsule Take 25 mg by mouth 2 (two) times daily.      baclofen (LIORESAL) 20  MG tablet Take 20 mg by mouth 3 (three) times daily.      buPROPion (WELLBUTRIN XL) 300 MG 24 hr tablet Take 300 mg by mouth once daily.      celecoxib (CELEBREX) 200 MG capsule Take 1 capsule (200 mg total) by mouth daily as needed for Pain. 40 capsule 1    esomeprazole (NEXIUM) 40 MG capsule TAKE 1 CAPSULE BY MOUTH EVERY DAY 90 capsule 1    ondansetron (ZOFRAN-ODT) 4 MG TbDL Take 4 mg by mouth every 6 (six) hours as needed.      testosterone cypionate (DEPOTESTOTERONE CYPIONATE) 200 mg/mL injection Inject 150 mg into the muscle every 7 days.      tiZANidine (ZANAFLEX) 4 MG tablet Take 1 tablet (4 mg total) by mouth nightly as needed (muscle spasms). 40 tablet 1    ALPRAZolam (XANAX) 1 MG tablet TAKE 1 TABLET BY MOUTH 1 HOUR PRIOR TO APPOINTMENT. BRING BOTTLE WITH YOU TO APPOINTMENT. MUST HAVE . 4 tablet 0     No current facility-administered medications on file prior to visit.      Review of patient's allergies indicates:  No Known Allergies   Family History not pertinent   Social History     Socioeconomic History    Marital status:    Tobacco Use    Smoking status: Never    Smokeless tobacco: Never   Substance and Sexual Activity    Alcohol use: Yes     Alcohol/week: 10.0 standard drinks of alcohol     Types: 10 Glasses of wine per week     Comment: stopped in 2021, 4-6 times per week, 1/2 wine bottle per night    Drug use: Not Currently     Types: Amphetamines    Sexual activity: Yes     Partners: Female     Birth control/protection: None     Comment: partner has Mirena   Social History Narrative    January [birth year]    Jack 16 mos    Works as .     Social Determinants of Health     Financial Resource Strain: Low Risk  (4/23/2024)    Overall Financial Resource Strain (CARDIA)     Difficulty of Paying Living Expenses: Not hard at all   Food Insecurity: No Food Insecurity (4/23/2024)    Hunger Vital Sign     Worried About Running Out of Food in the Last Year: Never true     Ran Out of Food in  the Last Year: Never true   Transportation Needs: Unmet Transportation Needs (4/23/2024)    PRAPARE - Transportation     Lack of Transportation (Medical): Yes     Lack of Transportation (Non-Medical): No   Physical Activity: Sufficiently Active (4/23/2024)    Exercise Vital Sign     Days of Exercise per Week: 7 days     Minutes of Exercise per Session: 70 min   Stress: No Stress Concern Present (4/23/2024)    [employer] - Occupational Stress Questionnaire     Feeling of Stress : Only a little   Housing Stability: Low Risk  (2/4/2022)    Housing Stability Vital Sign     Unable to Pay for Housing in the Last Year: No     Number of Places Lived in the Last Year: 1     Unstable Housing in the Last Year: No         Review of Systems:   Constitutional:  Denies fever or chills    Eyes:  Denies change in visual acuity    HENT:  Denies nasal congestion or sore throat    Respiratory:  Denies cough or shortness of breath    Cardiovascular:  Denies chest pain or edema    GI:  Denies abdominal pain, nausea, vomiting, bloody stools or diarrhea    :  Denies dysuria    Integument:  Denies rash    Neurologic:  Denies headache, focal weakness or sensory changes    Endocrine:  Denies polyuria or polydipsia    Lymphatic:  Denies swollen glands    Psychiatric:  Denies depression or anxiety       Physical Exam:    Constitutional:  Well developed, well nourished, no acute distress, non-toxic appearance    Integument:  Well hydrated, no rash    Lymphatic:  No lymphadenopathy noted    Neurologic:  Alert & oriented x 3,     Psychiatric:  Speech and behavior appropriate    Gi: abdomen soft  Eyes: EOMI     Bilateral Shoulder Exam    right Shoulder Exam   Shoulder exam performed same as contralateral side and is normal.    left Shoulder Exam   Tenderness   Shoulder tenderness location: diffusely about shoulder.    Range of Motion   Forward Flexion: abnormal   External Rotation: abnormal     Muscle Strength    Supraspinatus: 4/5     Tests   Hawkin's test: positive  Impingement: positive    Other   Erythema: absent  Sensation: normal  Pulse: present     L spine  +SLR at 30 degrees. +long tract signs. Pain with provocative testing. Feet perfused.     C spine  No stepoffs. FROM neck. Pain reproducible with head manipulation. Hands perfused.      Lumbar radiculopathy  -     MRI Lumbar Spine Without Contrast; Future; Expected date: 04/23/2024    Left shoulder pain, unspecified chronicity  -     MRI Shoulder Without Contrast Left; Future; Expected date: 04/23/2024    Cervical radiculopathy  -     MRI Cervical Spine Without Contrast; Future; Expected date: 04/23/2024    Other orders  -     methocarbamoL (ROBAXIN) 750 MG Tab; Take 1 tablet (750 mg total) by mouth 4 (four) times daily as needed.  Dispense: 44 tablet; Refill: 3        Will order MRIs as above and have him return after studies.         mouth pain

## 2024-06-28 ENCOUNTER — PATIENT MESSAGE (OUTPATIENT)
Dept: PAIN MEDICINE | Facility: CLINIC | Age: 44
End: 2024-06-28
Payer: COMMERCIAL

## 2024-07-02 ENCOUNTER — OFFICE VISIT (OUTPATIENT)
Dept: PAIN MEDICINE | Facility: CLINIC | Age: 44
End: 2024-07-02
Attending: ANESTHESIOLOGY
Payer: COMMERCIAL

## 2024-07-02 VITALS
DIASTOLIC BLOOD PRESSURE: 83 MMHG | SYSTOLIC BLOOD PRESSURE: 120 MMHG | TEMPERATURE: 98 F | OXYGEN SATURATION: 98 % | WEIGHT: 174.63 LBS | HEART RATE: 76 BPM | RESPIRATION RATE: 18 BRPM | BODY MASS INDEX: 25.78 KG/M2

## 2024-07-02 DIAGNOSIS — M79.18 MYOFASCIAL PAIN SYNDROME: Primary | ICD-10-CM

## 2024-07-02 DIAGNOSIS — M47.897 OTHER SPONDYLOSIS, LUMBOSACRAL REGION: ICD-10-CM

## 2024-07-02 DIAGNOSIS — M46.1 SACROILIITIS: ICD-10-CM

## 2024-07-02 PROCEDURE — 99999 PR PBB SHADOW E&M-EST. PATIENT-LVL IV: CPT | Mod: PBBFAC,,, | Performed by: ANESTHESIOLOGY

## 2024-07-02 NOTE — PATIENT INSTRUCTIONS
Exercise guide:    https://order.boo.nih.gov/sites/default/files/2018-04/boo-exercise-guide.pdf    https://www.boo.nih.gov/health/four-types-exercise-can-improve-your-[email or web address]-physical-ability

## 2024-07-02 NOTE — PROGRESS NOTES
Chronic patient Established Note (Follow up visit)        Interval HPI 7/2/24  Jorge Bae III presents to the clinic for a follow-up appointment for chronic low back pain. Since the last visit, Jorge Bae III states the pain has been more prominent on his left buttock pain that is worse with prolonged sitting, position changes in bed and going from sitting to standing position, also aggravated when he puts more weight on the left leg during golf stance and swing.  Denies new weakness, tingling, radiating pain past the knee.  He continues to take tiznaidine 4mg nightly and baclofen 20mg 1-2x/day as needed which helps with the pain.  Denies drowsiness with current medication regimen.      Pain Disability Index Review:      7/2/2024     1:52 PM 7/20/2022    11:57 AM 10/28/2021     8:16 AM   Last 3 PDI Scores   Pain Disability Index (PDI) 45 40 19       Interval HPI 6/20/24  Jorge Bae III returns today for follow-up of his axial low back pain. He underwent BL L3, 4, and 5 MBB on 6/12/24. He reports 0% reduction in pain. Last visit, we also refilled his zanaflex. He additionally takes celebrex 200mg daily prn. He takes baclofen 20mg most evenings and some mornings. He reports some pain relief with zanflex. He is unsure if he has done trigger point injections. He had diminishing return with epidurals but feels that facet injection in 2021 worked better. Pain is currently localized to left low back/buttock without radiation down the leg. Denies right sided pain currently, but he will sometimes have right sided low back pain. Pain is worse with weight bearing, especially related to golfing and walking. Pain interfering with sleep. Pain is improved by massage. Pain ranges from 4-8/10, currently 4/10. Previously completed healthy back and does HEP daily.      Interval HPI 5/16/24  Jorge Bae III presents tele-medicine appointment for a follow-up appointment for worsening low axial back  pain . Since the last visit, Jorge Bae III states the pain has been worsening. Current pain intensity is 8/10. Patient states this pain is different with no radiation down the leg and similar to his initial pain when he saw me on 10/2021 and had responded very well to facet joint injections.  He also has neck pain radiating down the hands with some numbness in his fingers but states his back pain is more severe and he would like to focus on the low back pain first       Interval HPI 6/19/23: Mr. Bae returns to the office for follow-up.  Today he reports worsening back pain that happened over the past few days.  He reports he was moving into a new house and was lifting boxes when the pain worsened.  He can get radiating pain down the left greater than right buttocks.   He is not having any new numbness or weakness in the lower extremities.  He does report over the past few months he has been waking up with some numbness into the 3rd and 4th digit left greater than right of his hands.      Interval HPI 9/9/22: Mr. Bae returns via telemedicine for follow-up.  He is status post bilateral L5-S1 TFESI on 08/16/2022.  When I previously saw him he had some weakness of the left EHL.  The weakness had improved during his neurosurgical follow-up and he continues to say today that he feels like he is having improvement in this.  He is not having any new or worsening radicular pain, numbness, or weakness.  He has been active with swimming and PT directed home exercise.     Interval History 4/18/2022:  Patient seen for virtual follow-up today.  He reports that his back pain has slowly started to return over the past couple months.  He has been doing some stretching and home exercises without significant improvement.  He is interested in repeating the facet joint injections as they gave him good relief for a few months last time. In addition to his low back pain, he is also having some right lateral hip pain in  the area of his GTB.  He denies any new numbness, tingling, weakness, saddle anesthesia, or bowel/bladder dysfunction.     Interval History 2/21/2022:  Mr Bae presents virtually for complaint of lower back pain. He states overall doing fair today. He had exacerbation of pain and voiced concern that while it has improved with left over PO steroid he is going skiing in upcoming weeks. He continues to take baclofen in conjunction with Zanaflex. He denies SE of medications. Denies new areas of pain.      Interval history 12/20/21:  Jorge Bae III presents tele-medicine appointment for a follow-up appointment for low back pain. He is s/p bilateral L4/5 and L5/S1 facet joint injections with 100% relief for ~2 weeks. Since the last visit, Jorge Bae states the pain has been improving. Current pain intensity is 4/10. His low back pain is still present after those two weeks of complete relief but significantly better. He does endorse some lateral hip pain that feels tight, nonradaiting. It can be present on left, right, or both. Stretching and theragun help the tightness. His back pain is worsened by forward flexion and running.        Initial consult 10/28/2021:  Jorge Bae III presents to the clinic for the evaluation of low back pain. The pain started 2-3 years ago following incident of twisting while carrying heavy objects and symptoms have been worsening.The pain is located in the lower back area and radiates to bilateral buttocks area and back of the thighs.  The pain is described as aching, boring, sharp, shooting and tight band and is rated as 6/10. The pain is rated with a score of  4/10 on the BEST day and a score of 8/10 on the WORST day.  Symptoms interfere with daily activity and sleeping. The pain is exacerbated by Sitting, Standing, Walking, Lifting and Getting out of bed/chair.  The pain is mitigated by heat, ice and medications.  Patient states he had been playing golf and regularly was  "doing exercises his entire adult life and now his lower extremity pain is affecting his activities of daily living  Patient denies night fever/night sweats, urinary incontinence, bowel incontinence, significant weight loss, significant motor weakness and loss of sensations.     Pain Medications:     - Opioids: none  - Anti-Depressants: none  - Anti-Convulsants: none  - Others: Celebrex and Zanaflex        Physical Therapy/Home Exercise: yes  HE with limited benefit      report:  Reviewed and consistent with medication use as prescribed.     Pain Procedures:  -bilateral L4/5 and L5/S1 facet joint injections on 11/3/21 90% RELIEF   -L5-S1 on 07/22/2022 and reports 85-90% relief   - bilateral L5-S1 TFESI on 08/16/2022 with over 90% relief  - bilateral L3,4,5 MBB  6/12/24  no relief     Imaging:     MRI CERVICAL SPINE WITHOUT CONTRAST 4/24     CLINICAL HISTORY:  Myelopathy, acute, cervical spine;  Radiculopathy, cervical region. Left sided cervical pain for 2-3 months. No specific injury but pt states he moved his head a "certain way". He states it hurt enough to keep him up at time for a  while. It's better now but still having intermittent tingling of the left and occasionally the right 3rd-5th fingers.     TECHNIQUE:  Multiplanar, multisequence MR images of the cervical spine were acquired without the administration of contrast.     COMPARISON:  None.     FINDINGS:  CORD: Normal size and signal.  No syrinx.  Cervicomedullary junction is normal.  Incidental small left foraminal perineural cyst at T1-2.     ALIGNMENT: Normal.  Lateral masses of C1 and C2 are congruent.     BONES: Vertebral body heights are maintained.  No aggressive bone marrow signal.     PARASPINAL AREA: Normal.     CERVICAL DISC LEVELS:     C2-C3: Mild disc osteophyte complex.  Moderate left facet hypertrophy.  Preserved ventral and dorsal CSF surrounding the cord.  Moderate-severe left and mild-moderate right foraminal stenosis.     C3-C4: Mild " disc osteophyte complex.  Mild bilateral facet hypertrophy.  Preserved ventral and dorsal CSF surrounding the cord.  Mild bilateral foraminal stenosis.     C4-C5: Mild disc osteophyte complex, eccentric to the right.  Minimal right facet hypertrophy.  Slight ventral cord flattening with preserved ventral and dorsal CSF surrounding the cord.  Mild-moderate right foraminal stenosis.     C5-C6: Moderate disc osteophyte complex with right lateral recess soft disc extrusion.  Mild-moderate right ventral cord flattening with effacement of ventral and thin sliver preserved dorsal CSF surrounding the cord.  Moderate right and mild left foraminal stenosis.     C6-C7: Mild disc osteophyte complex with right paracentral likely soft disc extrusion.  Mild bilateral facet hypertrophy.  Slight ventral cord flattening with thin sliver preserved ventral and preserved dorsal CSF surrounding the cord.  Moderate right and mild-moderate left foraminal stenosis.     C7-T1: Mild disc osteophyte complex.  Mild left and minimal right facet hypertrophy.  Preserved ventral and dorsal CSF surrounding the cord.  Mild left foraminal stenosis.     Impression:     1. Multilevel spondylosis, greatest at C5-6 where there is mild-moderate right cord flattening with some preserved CSF surrounding the cord and no cord signal abnormality.  2. Multilevel foraminal stenosis, greatest on the left at C2-3 where it is moderate-severe.  Moderate foraminal stenosis on the right at C5-6 and C6-7.  MRI LUMBAR SPINE WITHOUT CONTRAST 4/24     CLINICAL HISTORY:  Lumbar radiculopathy, no red flags, no prior management; radiculopathy, lumbar region.  Chronic low back, per patient bulging disk at L5-S1. When standing and putting pressure on the left leg the pain shoots from the low back into the left buttock and down the later left thigh. At times this makes his leg feel weak like it wants to give out.     TECHNIQUE:  Multiplanar, multisequence MR images were acquired  from the thoracolumbar junction to the sacrum without the administration of contrast.     COMPARISON:  MRI lumbar spine without contrast, 08/13/2022.     FINDINGS:  NOMENCLATURE: Five lumbar type vertebral bodies.     CORD/CAUDA EQUINA: Conus has normal size and signal and ends at a normal level of L1-L2.     ALIGNMENT: Normal.     BONES: Vertebral body heights are maintained.  Prominent type 1 endplate changes at L5-S1.  No aggressive bone marrow signal.     PARASPINAL AREA: Normal.     LUMBAR DISC LEVELS:     T12-L1: No disc herniation or significant posterior osteophytic ridging.  No significant spinal canal or foraminal stenosis.     L1-L2: No disc herniation or significant posterior osteophytic ridging.  No significant spinal canal or foraminal stenosis.     L2-L3: No disc herniation or significant posterior osteophytic ridging.  Minimal right greater than left facet hypertrophy.  No significant spinal canal or foraminal stenosis.     L3-L4: Minimal disc bulge..  Mild right and minimal left facet hypertrophy.  Ligamentum flavum thickening.  Minimal narrowing of the bilateral lateral recesses.  No significant spinal canal or foraminal stenosis.     L4-L5: Mild disc bulge with tiny central protrusion with high-intensity zone in the protrusion.  Slight disc height loss with loss of normal T2 signal in the disc.  Minimal bilateral facet hypertrophy.  Mild-moderate narrowing of the bilateral lateral recesses, unchanged.  No significant spinal canal stenosis.  Mild bilateral foraminal stenosis, unchanged.     L5-S1: Mild disc bulge with superimposed central extrusion migrating cranially to the infra pedicle level, similar in size compared to prior study.  Disc height loss and loss of normal T2 signal in the disc.  Mild-moderate narrowing of the bilateral lateral recesses.  No significant spinal canal stenosis.  Mild bilateral foraminal stenosis, unchanged.     Impression:     1. Multilevel spondylosis, similar to  prior study from 08/13/2022, greatest at L4-5 and L5-S1 where there is mild-moderate narrowing of the lateral recesses without significant spinal canal stenosis.  2. Mild foraminal stenosis bilaterally at L4-5 and L5-S1.        MRI lumbar spine 11/9/21  FINDINGS:  The lumbar spine demonstrates proper alignment. The vertebral bodies show normal signal intensity and height with no indication of acute fracture or pathologic marrow replacement process.  Multilevel disc desiccation most prominent L5-S1.     The demonstrated portion of the spinal cord is normal in signal intensity at all levels with no indication of myelomalacia or cord edema. Conus terminates at L1.  Evaluation of the surrounding soft tissue structures demonstrates no acute abnormality.     Degenerative findings:     T12-L1: No significant spinal canal stenosis or neural foraminal narrowing.  L1-L2: No significant spinal canal stenosis or neural foraminal narrowing.  L2-L3: No significant spinal canal stenosis or neural foraminal narrowing.  L3-L4: Mild right facet arthropathy.  No canal or foraminal stenosis.  L4-L5: Disc bulge with central annular fissure.  Bilateral facet arthropathy.  No canal stenosis.  Mild bilateral foraminal stenosis.  L5-S1: Disc bulge with central extrusion and cranial migration.  Mild canal stenosis.  Moderate bilateral foraminal stenosis.  Mild edema in the posterior aspect of the L5 inferior endplate.     MRI lumbar spine 8/13/22  FINDINGS:  The marrow signal is within normal limits.  The conus lies at L1 vertebral body level.  L1/2: There are no significant degenerative changes at this level.     L2/3: There are no significant degenerative changes at this level.  L3/4: There are no significant degenerative changes at this level.  L4/5: There is a posteriorly oriented disc protrusion with small annular tear.  L5/S1: There is a posteriorly oriented disc extrusion with moderate narrowing of bilateral neural foramina.        Allergies: Review of patient's allergies indicates:  No Known Allergies    Current Medications:   Current Outpatient Medications   Medication Sig Dispense Refill    atomoxetine (STRATTERA) 25 MG capsule Take 25 mg by mouth 2 (two) times daily.      azelastine (ASTELIN) 137 mcg (0.1 %) nasal spray 1 spray (137 mcg total) by Nasal route 2 (two) times daily. 30 mL 11    baclofen (LIORESAL) 20 MG tablet Take 20 mg by mouth 3 (three) times daily.      buPROPion (WELLBUTRIN XL) 300 MG 24 hr tablet Take 300 mg by mouth once daily.      celecoxib (CELEBREX) 200 MG capsule Take 1 capsule (200 mg total) by mouth daily as needed for Pain. 40 capsule 1    esomeprazole (NEXIUM) 40 MG capsule TAKE 1 CAPSULE BY MOUTH EVERY DAY 90 capsule 1    fluticasone propionate (FLONASE) 50 mcg/actuation nasal spray 2 sprays (100 mcg total) by Each Nostril route 2 (two) times a day. 16 g 11    methocarbamoL (ROBAXIN) 750 MG Tab Take 1 tablet (750 mg total) by mouth 4 (four) times daily as needed. 44 tablet 3    ondansetron (ZOFRAN-ODT) 4 MG TbDL Take 4 mg by mouth every 6 (six) hours as needed.      testosterone cypionate (DEPOTESTOTERONE CYPIONATE) 200 mg/mL injection Inject 150 mg into the muscle every 7 days.      tiZANidine (ZANAFLEX) 4 MG tablet TAKE 1 TABLET BY MOUTH EVERY DAY IN THE EVENING AS NEEDED FOR MUSCLE SPASMS 90 tablet 1     No current facility-administered medications for this visit.       REVIEW OF SYSTEMS:    GENERAL:  No weight loss, malaise or fevers.  HEENT:   No recent changes in vision or hearing  NECK:  Negative for lumps, no difficulty with swallowing.  RESPIRATORY:  Negative for cough, wheezing or shortness of breath, patient denies any recent URI.  CARDIOVASCULAR:  Negative for chest pain, leg swelling or palpitations.  GI:  Negative for abdominal discomfort, blood in stools or black stools or change in bowel habits.  MUSCULOSKELETAL:  See HPI.  SKIN:  Negative for lesions, rash, and itching.  PSYCH:  No mood  disorder or recent psychosocial stressors.  Patients sleep is  disturbed secondary to pain.  HEMATOLOGY/LYMPHOLOGY:  Negative for prolonged bleeding, bruising easily or swollen nodes.  Patient is not currently taking any anti-coagulants  NEURO:   No history of headaches, syncope, paralysis, seizures or tremors.  All other reviewed and negative other than HPI.       Past Medical History:  Past Medical History:   Diagnosis Date    Depression     Hematuria     Testosterone deficiency        Past Surgical History:  Past Surgical History:   Procedure Laterality Date    deviated septum repair      EPIDURAL STEROID INJECTION INTO LUMBAR SPINE N/A 7/22/2022    Procedure: Injection-steroid-epidural-lumbar L5/S1;  Surgeon: Reno Yu MD;  Location: [facility];  Service: Pain Management;  Laterality: N/A;    ESOPHAGOGASTRODUODENOSCOPY N/A 11/11/2020    Procedure: EGD (ESOPHAGOGASTRODUODENOSCOPY);  Surgeon: Yunior Stevens MD;  Location: [facility];  Service: Endoscopy;  Laterality: N/A;    ESOPHAGOGASTRODUODENOSCOPY N/A 12/4/2023    Procedure: EGD (ESOPHAGOGASTRODUODENOSCOPY);  Surgeon: Mike Chambers MD;  Location: [facility];  Service: Gastroenterology;  Laterality: N/A;    INJECTION OF ANESTHETIC AGENT AROUND NERVE Bilateral 6/12/2024    Procedure: BLOCK, NERVE BILATERAL L3, 4, 5 MEDIAL BRANCH;  Surgeon: Mateus Vargas MD;  Location: [facility];  Service: Pain Management;  Laterality: Bilateral;  [phone]    INJECTION OF FACET JOINT Bilateral 11/3/2021    Procedure: INJECTION, FACET JOINT BILATERAL L4/5, L5/S1;  Surgeon: Mateus Vargas MD;  Location: [facility];  Service: Pain Management;  Laterality: Bilateral;    TRANSFORAMINAL EPIDURAL INJECTION OF STEROID Bilateral 8/16/2022    Procedure: Injection,steroid,epidural,transforaminal approach L5/S1;  Surgeon: Reno Yu MD;  Location: [facility] OR;  Service: Pain Management;  Laterality: Bilateral;    TRANSFORAMINAL EPIDURAL INJECTION OF STEROID  Bilateral 7/7/2023    Procedure: Injection,steroid,epidural,transforaminal approach;  Surgeon: Reno Yu MD;  Location: [facility] OR;  Service: Pain Management;  Laterality: Bilateral;  L5/S1       Family History:  Family History   Problem Relation Name Age of Onset    Alzheimer's disease Mother Mirela Bae     Early death Mother Mirela Bae     No Known Problems Father      Heart disease Maternal Grandfather Roge Do     Glaucoma Neg Hx      Macular degeneration Neg Hx      Retinal detachment Neg Hx         Social History:  Social History     Socioeconomic History    Marital status:    Tobacco Use    Smoking status: Never    Smokeless tobacco: Never   Substance and Sexual Activity    Alcohol use: Yes     Alcohol/week: 10.0 standard drinks of alcohol     Types: 10 Glasses of wine per week     Comment: stopped in 2021, 4-6 times per week, 1/2 wine bottle per night    Drug use: Not Currently     Types: Amphetamines    Sexual activity: Yes     Partners: Female     Birth control/protection: None     Comment: partner has Mirena   Social History Narrative    January 4    Zia 16 mos    Works as .     Social Determinants of Health     Financial Resource Strain: Low Risk  (4/23/2024)    Overall Financial Resource Strain (CARDIA)     Difficulty of Paying Living Expenses: Not hard at all   Food Insecurity: No Food Insecurity (4/23/2024)    Hunger Vital Sign     Worried About Running Out of Food in the Last Year: Never true     Ran Out of Food in the Last Year: Never true   Transportation Needs: Unmet Transportation Needs (4/23/2024)    PRAPARE - Transportation     Lack of Transportation (Medical): Yes     Lack of Transportation (Non-Medical): No   Physical Activity: Sufficiently Active (4/23/2024)    Exercise Vital Sign     Days of Exercise per Week: 7 days     Minutes of Exercise per Session: 70 min   Stress: No Stress Concern Present (4/23/2024)    [employer] -  Occupational Stress Questionnaire     Feeling of Stress : Only a little   Housing Stability: Low Risk  (2/4/2022)    Housing Stability Vital Sign     Unable to Pay for Housing in the Last Year: No     Number of Places Lived in the Last Year: 1     Unstable Housing in the Last Year: No       OBJECTIVE:    /83   Pulse 76   Temp 97.8 °F (36.6 °C)   Resp 18   Wt 79.2 kg (174 lb 9.7 oz)   SpO2 98%   BMI 25.78 kg/m²     PHYSICAL EXAMINATION:    General appearance: Well appearing, in no acute distress, alert and oriented x3.  Psych:  Mood and affect appropriate.  Skin: Skin color, texture, turgor normal, no rashes or lesions, in both upper and lower body.  Head/face:  Atraumatic, normocephalic. No palpable lymph nodes  Neck: No pain to palpation over the cervical paraspinous muscles. Spurling Negative. No pain with neck flexion, extension, or lateral flexion. .  Cor: RRR  Pulm: non-labored breathing  GI: Abdomen soft and non-tender.  Back: Straight leg raising in the sitting and supine positions is negative to radicular pain. No pain to palpation over the spine or costovertebral angles. Normal range of motion without pain reproduction.Positive axial loading test bilateral. Positive tenderness over both SIJ with positive thigh and sacral thrust test, Positive FABERE,Ganselin and Yeoman's test on the both side.negative FADIR  Neuro: Bilateral upper and lower extremity coordination and muscle stretch reflexes are physiologic and symmetric.  Plantar response are downgoing. No loss of sensation is noted.  Gait: Normal.    ASSESSMENT: 43 y.o. year old male  with chronic low back pain consistent with     1. Myofascial pain syndrome        2. Sacroiliitis        3. Other spondylosis, lumbosacral region              PLAN:     - I have stressed the importance of physical activity and a home exercise plan to help with pain and improve health.  - Patient can continue with medications for now since they are providing  benefits, using them appropriately, and without side effects.  - Continue zanaflex 4mg PRN (nightly) and baclofen 20mg TID PRN (1-2x./day), patient does not need refills at this time  - Trigger Point Injection preformed in clinic today. See procedure note below for details.  - return to clinic in 4 weeks for virtual visit to assess pain efficiacy post today's TPI. Consider SIJ injection at that time.  - Counseled patient regarding the importance of activity modification including utilizing stand up desk and swimming as a low impact form of cardio-exercise.    Hector Soares   I have personally reviewed the history and exam of this patient and agree with the resident/fellow/NPs note as stated above.    Mateus Vargas MD      Patient Name: Jorge Bae III  MRN: [id number]    INFORMED CONSENT: The procedure, risks, benefits and options were discussed with patient. There are no contraindications to the procedure. The patient expressed understanding and agreed to proceed. The personnel performing the procedure was discussed. I verify that I personally obtained Jorge's consent prior to the start of the procedure and the signed consent can be found on the patient's chart.    Procedure Date: 07/02/2024    Anesthesia: Topical    Pre Procedure diagnosis:   1. Sacroiliac joint pain    2. Myofascial pain syndrome    3. Lumbar spondylosis        Post-Procedure diagnosis: same      Sedation: None    PROCEDURE: TRIGGER POINT INJECTION: Bilateral Iliocostalis, Bilateral Longisimis, Left gluteus amirah  The patient was placed in a seated position. The site of pain and procedure were confirmed with the patient prior to starting the procedure. After performing time out. The patient's  trigger points were identified and marked. The skin was prepped with chlorhexidine three times.   After performing time out A 25-gauge 1.5 inch  needle was advanced through the skin and subcutaneous tissues.  Aspiration for blood, air and  CSF was negative.  A total of 10 ml of Bupivacaine 0.25% and 40 mg Depo-Medrol  was injected at all trigger point.  No complications were evident. No specimens collected.    Blood Loss: Nill  Specimen: None    The above plan and management options were discussed at length with patient. Patient is in agreement with the above and verbalized understanding.    Mateus Vargas MD    07/02/2024

## 2024-07-05 PROBLEM — M47.897 OTHER SPONDYLOSIS, LUMBOSACRAL REGION: Status: ACTIVE | Noted: 2021-12-20

## 2024-07-05 PROBLEM — M46.1 SACROILIITIS: Status: ACTIVE | Noted: 2024-07-05

## 2024-07-05 PROBLEM — M47.9 OSTEOARTHRITIS OF SPINE: Status: RESOLVED | Noted: 2021-12-20 | Resolved: 2024-07-05

## 2024-07-11 ENCOUNTER — OFFICE VISIT (OUTPATIENT)
Dept: PAIN MEDICINE | Facility: CLINIC | Age: 44
End: 2024-07-11
Payer: COMMERCIAL

## 2024-07-11 ENCOUNTER — PATIENT MESSAGE (OUTPATIENT)
Dept: PAIN MEDICINE | Facility: CLINIC | Age: 44
End: 2024-07-11

## 2024-07-11 VITALS
HEIGHT: 69 IN | WEIGHT: 171.75 LBS | BODY MASS INDEX: 25.44 KG/M2 | SYSTOLIC BLOOD PRESSURE: 149 MMHG | HEART RATE: 80 BPM | DIASTOLIC BLOOD PRESSURE: 85 MMHG

## 2024-07-11 DIAGNOSIS — M54.51 VERTEBROGENIC LOW BACK PAIN: ICD-10-CM

## 2024-07-11 DIAGNOSIS — M54.16 LUMBAR RADICULITIS: ICD-10-CM

## 2024-07-11 DIAGNOSIS — M79.18 MYOFASCIAL PAIN: Primary | ICD-10-CM

## 2024-07-11 PROCEDURE — 99999 PR PBB SHADOW E&M-EST. PATIENT-LVL III: CPT | Mod: PBBFAC,,, | Performed by: ANESTHESIOLOGY

## 2024-07-11 RX ORDER — METHYLPREDNISOLONE ACETATE 40 MG/ML
40 INJECTION, SUSPENSION INTRA-ARTICULAR; INTRALESIONAL; INTRAMUSCULAR; SOFT TISSUE
Status: COMPLETED | OUTPATIENT
Start: 2024-07-11 | End: 2024-07-11

## 2024-07-11 RX ADMIN — METHYLPREDNISOLONE ACETATE 40 MG: 40 INJECTION, SUSPENSION INTRA-ARTICULAR; INTRALESIONAL; INTRAMUSCULAR; SOFT TISSUE at 01:07

## 2024-07-11 NOTE — PROGRESS NOTES
[facility] Follow Up Evaluation      CC:   No chief complaint on file.           7/11/2024    12:57 PM 7/11/2024    12:54 PM 7/2/2024     1:52 PM   Last 3 PDI Scores   Pain Disability Index (PDI) 36 36 45       Interval HPI 7/11/24: Mr. Bae returns to the office for follow-up.  He reports he is having 2 separate issues.  His 1st pain issue is related to pain on the left side of his neck that goes down towards the left shoulder.  He denies any radicular pain.  He has not having any weakness in his arms.  His left-sided neck pain that goes to the shoulder bothers him constantly and also makes it difficult for him to get comfortable sleeping in the evening times.    He also reports he is having lower back pain.  The pain is in the lower back but he can feel it towards the top of his gluteal region.  He feels like his back pain is worse with activities such as when he is playing golf.  He also reports that he has trouble flexing his back to put on pants without significant back pain.  He denies any numbness or weakness in his lower extremities.      Pain intervention history:   - s/p bilateral L4/5 and L5/S1 facet joint injections on 11/3/21 on the [facility]  - s/p L5-S1 on 07/22/2022 and reports 85-90% relief   - s/p bilateral L5-S1 TFESI on 08/16/2022 with over 90% relief  - s/p bilateral L5-S1 TFESI on 7/23/2023  - diagnostic bilateral L4/5 and L5/S1 medial branch blocks on 6/12/24 on the [facility]      HPI:   Jorge Bae III is a 43 y.o. male who presents with back pain.  He has had chronic lower back pain for the past few years however he reports that a couple days ago he was playing golf and experienced sharp, sudden severe lower back pain.  Today he reports his pain is 8/10, aching, sharp with radiation into his bilateral buttocks and down the back of his his thighs.  He denies any numbness or weakness.  His pain is worse with standing and walking and relieved with sitting and lying  down.    History:    Current Outpatient Medications:     atomoxetine (STRATTERA) 25 MG capsule, Take 25 mg by mouth 2 (two) times daily., Disp: , Rfl:     azelastine (ASTELIN) 137 mcg (0.1 %) nasal spray, 1 spray (137 mcg total) by Nasal route 2 (two) times daily., Disp: 30 mL, Rfl: 11    baclofen (LIORESAL) 20 MG tablet, Take 20 mg by mouth 3 (three) times daily., Disp: , Rfl:     buPROPion (WELLBUTRIN XL) 300 MG 24 hr tablet, Take 300 mg by mouth once daily., Disp: , Rfl:     celecoxib (CELEBREX) 200 MG capsule, Take 1 capsule (200 mg total) by mouth daily as needed for Pain., Disp: 40 capsule, Rfl: 1    esomeprazole (NEXIUM) 40 MG capsule, TAKE 1 CAPSULE BY MOUTH EVERY DAY, Disp: 90 capsule, Rfl: 1    fluticasone propionate (FLONASE) 50 mcg/actuation nasal spray, 2 sprays (100 mcg total) by Each Nostril route 2 (two) times a day., Disp: 16 g, Rfl: 11    ondansetron (ZOFRAN-ODT) 4 MG TbDL, Take 4 mg by mouth every 6 (six) hours as needed., Disp: , Rfl:     testosterone cypionate (DEPOTESTOTERONE CYPIONATE) 200 mg/mL injection, Inject 150 mg into the muscle every 7 days., Disp: , Rfl:     tiZANidine (ZANAFLEX) 4 MG tablet, TAKE 1 TABLET BY MOUTH EVERY DAY IN THE EVENING AS NEEDED FOR MUSCLE SPASMS, Disp: 90 tablet, Rfl: 1    methocarbamoL (ROBAXIN) 750 MG Tab, Take 1 tablet (750 mg total) by mouth 4 (four) times daily as needed. (Patient not taking: Reported on 7/11/2024), Disp: 44 tablet, Rfl: 3    Current Facility-Administered Medications:     methylPREDNISolone acetate injection 40 mg, 40 mg, Intramuscular, 1 time in Clinic/HOD,     Past Medical History:   Diagnosis Date    Depression     Hematuria     Testosterone deficiency        Past Surgical History:   Procedure Laterality Date    deviated septum repair      EPIDURAL STEROID INJECTION INTO LUMBAR SPINE N/A 7/22/2022    Procedure: Injection-steroid-epidural-lumbar L5/S1;  Surgeon: Reno Yu MD;  Location: [facility] OR;  Service: Pain Management;   Laterality: N/A;    ESOPHAGOGASTRODUODENOSCOPY N/A 11/11/2020    Procedure: EGD (ESOPHAGOGASTRODUODENOSCOPY);  Surgeon: Yunior Stevens MD;  Location: [facility];  Service: Endoscopy;  Laterality: N/A;    ESOPHAGOGASTRODUODENOSCOPY N/A 12/4/2023    Procedure: EGD (ESOPHAGOGASTRODUODENOSCOPY);  Surgeon: Mike Chambers MD;  Location: [facility] ENDO;  Service: Gastroenterology;  Laterality: N/A;    INJECTION OF ANESTHETIC AGENT AROUND NERVE Bilateral 6/12/2024    Procedure: BLOCK, NERVE BILATERAL L3, 4, 5 MEDIAL BRANCH;  Surgeon: Mateus Vargas MD;  Location: [facility] PAIN MGT;  Service: Pain Management;  Laterality: Bilateral;  [phone]    INJECTION OF FACET JOINT Bilateral 11/3/2021    Procedure: INJECTION, FACET JOINT BILATERAL L4/5, L5/S1;  Surgeon: Mateus Vargas MD;  Location: [facility] PAIN MGT;  Service: Pain Management;  Laterality: Bilateral;    TRANSFORAMINAL EPIDURAL INJECTION OF STEROID Bilateral 8/16/2022    Procedure: Injection,steroid,epidural,transforaminal approach L5/S1;  Surgeon: Reno Yu MD;  Location: [facility] OR;  Service: Pain Management;  Laterality: Bilateral;    TRANSFORAMINAL EPIDURAL INJECTION OF STEROID Bilateral 7/7/2023    Procedure: Injection,steroid,epidural,transforaminal approach;  Surgeon: Reno Yu MD;  Location: [facility] OR;  Service: Pain Management;  Laterality: Bilateral;  L5/S1       Family History   Problem Relation Name Age of Onset    Alzheimer's disease Mother Mirela Bae     Early death Mother Mirela Bae     No Known Problems Father      Heart disease Maternal Grandfather Roge Hi     Glaucoma Neg Hx      Macular degeneration Neg Hx      Retinal detachment Neg Hx         Social History     Socioeconomic History    Marital status:    Tobacco Use    Smoking status: Never    Smokeless tobacco: Never   Substance and Sexual Activity    Alcohol use: Yes     Alcohol/week: 10.0 standard drinks of alcohol     Types: 10 Glasses of wine per week      Comment: stopped in 2021, 4-6 times per week, 1/2 wine bottle per night    Drug use: Not Currently     Types: Amphetamines    Sexual activity: Yes     Partners: Female     Birth control/protection: None     Comment: partner has Mirena   Social History Narrative    January Lizarraga 16 mos    Works as .     Social Determinants of Health     Financial Resource Strain: Low Risk  (4/23/2024)    Overall Financial Resource Strain (CARDIA)     Difficulty of Paying Living Expenses: Not hard at all   Food Insecurity: No Food Insecurity (4/23/2024)    Hunger Vital Sign     Worried About Running Out of Food in the Last Year: Never true     Ran Out of Food in the Last Year: Never true   Transportation Needs: Unmet Transportation Needs (4/23/2024)    PRAPARE - Transportation     Lack of Transportation (Medical): Yes     Lack of Transportation (Non-Medical): No   Physical Activity: Sufficiently Active (4/23/2024)    Exercise Vital Sign     Days of Exercise per Week: 7 days     Minutes of Exercise per Session: 70 min   Stress: No Stress Concern Present (4/23/2024)    [employer] - Occupational Stress Questionnaire     Feeling of Stress : Only a little   Housing Stability: Low Risk  (2/4/2022)    Housing Stability Vital Sign     Unable to Pay for Housing in the Last Year: No     Number of Places Lived in the Last Year: 1     Unstable Housing in the Last Year: No       Review of patient's allergies indicates:  No Known Allergies    Review of Systems:  General ROS: negative for - fever  Psychological ROS: negative for - hostility  Hematological and Lymphatic ROS: negative for - bleeding problems  Endocrine ROS: negative for - unexpected weight changes  Respiratory ROS: no cough, shortness of breath, or wheezing  Cardiovascular ROS: no chest pain or dyspnea on exertion  Gastrointestinal ROS: no abdominal pain, change in bowel habits, or black or bloody stools  Musculoskeletal ROS: negative  "for - muscular weakness  Neurological ROS: negative for - numbness/tingling  Dermatological ROS: negative for rash    Physical Exam:  Vitals:    07/11/24 1254   BP: (!) 149/85   Pulse: 80   Weight: 77.9 kg (171 lb 11.8 oz)   Height: 5' 9" (1.753 m)   PainSc:   6   PainLoc: Back       Body mass index is 25.36 kg/m².    Gen: NAD  Gait: gait intact  Psych:  Mood appropriate for given condition  HEENT: eyes anicteric   GI: Abd soft  CV: RRR  Lungs: breathing unlabored   Sensation: intact to light touch in all dermatomes tested from L2-S1 bilaterally  Reflexes: 2+ b/l patella and achilles  Tone: normal in the b/l knees and hips   Skin: intact  Extremities: No edema in b/l ankles or hands  Provacative tests:        Right Left   L2/3 Iliacus Hip flexion  5  5   L3/4 Qudratus Femoris Knee Extension  5  5   L4/5 Tib Anterior Ankle Dorsiflexion   5  5   L5/S1 Extensor Hallicus Longus Great toe extension  5  5                 S1/S2 Gastroc/Soleus Plantar Flexion  5  5     Sensation: intact to lt touch bilaterally in c4-t1   Reflexes: 2+ left, 0 right Bicep, 2+ b/l tricep, Dodge negative  ROM: Cervical ROM full, shoulder, elbow and wrist ROM full  Tone:  Normal at elbow, wrist and shoulder   Inspection: no atrophy of bicep, FDI or APB noted  Special tests:  Spurling's negative, cervical facet loading negative  Palpation: tender left cervical paraspinals and left trapezius    Motor:    Right Left   C4 Shoulder Abduction  5  5   C5 Elbow Flexion    5  5   C6 Wrist Extension  5  5   C7 Elbow Extension   5  5   C8/T1 Hand Intrinsics   5  5                   Imaging:  MRI lumbar spine 11/9/21  FINDINGS:  The lumbar spine demonstrates proper alignment. The vertebral bodies show normal signal intensity and height with no indication of acute fracture or pathologic marrow replacement process.  Multilevel disc desiccation most prominent L5-S1.     The demonstrated portion of the spinal cord is normal in signal intensity at all levels " with no indication of myelomalacia or cord edema. Conus terminates at L1.  Evaluation of the surrounding soft tissue structures demonstrates no acute abnormality.     Degenerative findings:     T12-L1: No significant spinal canal stenosis or neural foraminal narrowing.  L1-L2: No significant spinal canal stenosis or neural foraminal narrowing.  L2-L3: No significant spinal canal stenosis or neural foraminal narrowing.  L3-L4: Mild right facet arthropathy.  No canal or foraminal stenosis.  L4-L5: Disc bulge with central annular fissure.  Bilateral facet arthropathy.  No canal stenosis.  Mild bilateral foraminal stenosis.  L5-S1: Disc bulge with central extrusion and cranial migration.  Mild canal stenosis.  Moderate bilateral foraminal stenosis.  Mild edema in the posterior aspect of the L5 inferior endplate.    MRI lumbar spine 8/13/22  FINDINGS:  The marrow signal is within normal limits.  The conus lies at L1 vertebral body level.  L1/2: There are no significant degenerative changes at this level.     L2/3: There are no significant degenerative changes at this level.  L3/4: There are no significant degenerative changes at this level.  L4/5: There is a posteriorly oriented disc protrusion with small annular tear.  L5/S1: There is a posteriorly oriented disc extrusion with moderate narrowing of bilateral neural foramina.    MRI lumbar spine 5/1/24  FINDINGS:  NOMENCLATURE: Five lumbar type vertebral bodies.     CORD/CAUDA EQUINA: Conus has normal size and signal and ends at a normal level of L1-L2.     ALIGNMENT: Normal.     BONES: Vertebral body heights are maintained.  Prominent type 1 endplate changes at L5-S1.  No aggressive bone marrow signal.     PARASPINAL AREA: Normal.     LUMBAR DISC LEVELS:  T12-L1: No disc herniation or significant posterior osteophytic ridging.  No significant spinal canal or foraminal stenosis.  L1-L2: No disc herniation or significant posterior osteophytic ridging.  No significant  spinal canal or foraminal stenosis.  L2-L3: No disc herniation or significant posterior osteophytic ridging.  Minimal right greater than left facet hypertrophy.  No significant spinal canal or foraminal stenosis.  L3-L4: Minimal disc bulge..  Mild right and minimal left facet hypertrophy.  Ligamentum flavum thickening.  Minimal narrowing of the bilateral lateral recesses.  No significant spinal canal or foraminal stenosis.  L4-L5: Mild disc bulge with tiny central protrusion with high-intensity zone in the protrusion.  Slight disc height loss with loss of normal T2 signal in the disc.  Minimal bilateral facet hypertrophy.  Mild-moderate narrowing of the bilateral lateral recesses, unchanged.  No significant spinal canal stenosis.  Mild bilateral foraminal stenosis, unchanged.  L5-S1: Mild disc bulge with superimposed central extrusion migrating cranially to the infra pedicle level, similar in size compared to prior study.  Disc height loss and loss of normal T2 signal in the disc.  Mild-moderate narrowing of the bilateral lateral recesses.  No significant spinal canal stenosis.  Mild bilateral foraminal stenosis, unchanged.    MRI cervical spine 5/1/24  FINDINGS:  CORD: Normal size and signal.  No syrinx.  Cervicomedullary junction is normal.  Incidental small left foraminal perineural cyst at T1-2.     ALIGNMENT: Normal.  Lateral masses of C1 and C2 are congruent.     BONES: Vertebral body heights are maintained.  No aggressive bone marrow signal.     PARASPINAL AREA: Normal.     CERVICAL DISC LEVELS:  C2-C3: Mild disc osteophyte complex.  Moderate left facet hypertrophy.  Preserved ventral and dorsal CSF surrounding the cord.  Moderate-severe left and mild-moderate right foraminal stenosis.  C3-C4: Mild disc osteophyte complex.  Mild bilateral facet hypertrophy.  Preserved ventral and dorsal CSF surrounding the cord.  Mild bilateral foraminal stenosis.  C4-C5: Mild disc osteophyte complex, eccentric to the right.   Minimal right facet hypertrophy.  Slight ventral cord flattening with preserved ventral and dorsal CSF surrounding the cord.  Mild-moderate right foraminal stenosis.  C5-C6: Moderate disc osteophyte complex with right lateral recess soft disc extrusion.  Mild-moderate right ventral cord flattening with effacement of ventral and thin sliver preserved dorsal CSF surrounding the cord.  Moderate right and mild left foraminal stenosis.  C6-C7: Mild disc osteophyte complex with right paracentral likely soft disc extrusion.  Mild bilateral facet hypertrophy.  Slight ventral cord flattening with thin sliver preserved ventral and preserved dorsal CSF surrounding the cord.  Moderate right and mild-moderate left foraminal stenosis.  C7-T1: Mild disc osteophyte complex.  Mild left and minimal right facet hypertrophy.  Preserved ventral and dorsal CSF surrounding the cord.  Mild left foraminal stenosis.      MRI left shoulder 5/1/24  Impression:  1. Low-grade muscle strain of the infraspinatus.  2. Supraspinatus tendinopathy with partial thickness articular surface tear with likely interstitial tear extension.  3. Mild infraspinatus tendinosis.  4. Isolated distal clavicle marrow edema with subcortical cystic change, adjacent soft tissue edema, and trace AC joint fluid.  Correlate for distal clavicle osteolysis.    Labs:  BMP  Lab Results   Component Value Date     03/29/2024    K 4.1 03/29/2024     03/29/2024    CO2 26 03/29/2024    BUN 31 (H) 03/29/2024    CREATININE 1.10 03/29/2024    CALCIUM 9.3 03/29/2024    ANIONGAP 8 03/29/2024    ESTGFRAFRICA >60 02/14/2022    EGFRNONAA >60 02/14/2022     Lab Results   Component Value Date    ALT 56 (H) 03/29/2024    AST 65 (H) 03/29/2024    ALKPHOS 97 03/29/2024    BILITOT 0.5 03/29/2024       Assessment:   Problem List Items Addressed This Visit    None  Visit Diagnoses       Myofascial pain    -  Primary    Relevant Medications    methylPREDNISolone acetate injection 40 mg  (Start on 7/11/2024  2:00 PM)    Lumbar radiculitis        Vertebrogenic low back pain                  43 y.o. year old male who presents with back pain.       7/11/24 - Mr. Bae returns to the office for follow-up.  He reports he is having 2 separate issues.  His 1st pain issue is related to pain on the left side of his neck that goes down towards the left shoulder.  He denies any radicular pain.  He has not having any weakness in his arms.  His left-sided neck pain that goes to the shoulder bothers him constantly and also makes it difficult for him to get comfortable sleeping in the evening times.    He also reports he is having lower back pain.  The pain is in the lower back but he can feel it towards the top of his gluteal region.  He feels like his back pain is worse with activities such as when he is playing golf.  He also reports that he has trouble flexing his back to put on pants without significant back pain.  He denies any numbness or weakness in his lower extremities.    - on exam he has full strength in his upper and lower extremities.  He is intact sensation to light touch bilateral C4-T1, L2-S1.  He has an absent right biceps DTR.  2+ bilateral triceps, left biceps, patellar.  Spurling's is negative.  Cervical facet loading is negative.  He has tenderness over the course of the trapezius muscle.  No pain with empty can or Neer's on the left    - I independently reviewed his cervical MRI with him.  At C5-6 he has a right paracentral disc extrusion causing lateral recess and foraminal narrowing    - I independently reviewed his lumbar MRI with him.  He has multilevel bilateral facet arthropathy.  At L5-S1 he has central disc extrusion and mild-to-moderate narrowing of the bilateral lateral recess.  At L4-5 he has a small central disc protrusion with annular fissure.  He has type 1 Modic endplate changes at L5-S1 and degenerative disc disease at L5-S1    - he has completed formal physical therapy in  the past and over the past 6 months has been maintaining a PT directed home exercise program.  He also uses muscle relaxants for any myofascial component of his pain    - I discussed I think his left-sided neck pain is myofascial in nature.  He is planning on doing some dry needling in the near future.  I offered him trigger point injection to left trapezius which we will do today.  The risks and benefits of this intervention, and alternative therapies were discussed with the patient.  The discussion of risks included infection, bleeding, need for additional procedures or surgery, nerve damage.  Questions regarding the procedure, risks, expected outcome, and possible side effects were solicited and answered to the patient's satisfaction.  Jorge Bae wishes to proceed with the injection or procedure.  Written consent was obtained.    - as for his lower back pain he failed to find significant improvement diagnostic lumbar medial branch blocks.  He also had a trigger point to the left gluteus muscle without significant relief.  I discussed that I think his lower back pain in his gluteal region is related to his disc extrusion.  Differential for his lower back pain also includes vertebral genetic low back pain and discogenic low back     -He would like to address his neck first and I discussed in the future if he continues to have back pain my recommendation would be try a lumbar epidural.  He will follow up with me on an as needed basis    : Not applicable    Reno Yu M.D.  Interventional Pain Medicine / Anesthesiology    This note was completed with dictation software and grammatical errors may exist.        Trigger Point Injection    This is a pain clinic procedure note.    Procedure: Trigger point injection  Procedure Date: 07/11/2024  Muscles Injected: left trapezius  Subjective: Multiple trigger points and areas of tenderness were identified and marked.  Preoperative Diagnosis: Myofascial Pain   Post  Procedure Diagnosis: Myofascial Pain   Findings: Radiating trigger points  Complications: None  Anesthetic: 50:50 Mixture of Lidocaine 2% and Bupivacaine 0.5%, 0.5- 1.0 ml per site + 40mg depomedrol     Procedure details: Skin overlying target injection sites cleaned with alcohol swabs.  Each identified trigger point was injected with the aforementioned anesthetic using a 1.25 inch 25G needle followed by needling technique.  Twitch response was elicited at some sites.    Total injections: 3    Dispo: no complications, pt tolerated the procedure well.

## 2024-07-15 ENCOUNTER — TELEPHONE (OUTPATIENT)
Dept: PAIN MEDICINE | Facility: CLINIC | Age: 44
End: 2024-07-15
Payer: COMMERCIAL

## 2024-07-16 NOTE — TELEPHONE ENCOUNTER
Physician - Dr Yu    Type of Procedure/Injection -  Caudal Epidural      Laterality - NA      Anxiolysis- RNIV      Need to hold medication - No      Clearance needed - No      Follow up - 3 week

## 2024-07-17 RX ORDER — CELECOXIB 200 MG/1
200 CAPSULE ORAL DAILY PRN
Qty: 40 CAPSULE | Refills: 1 | Status: SHIPPED | OUTPATIENT
Start: 2024-07-17

## 2024-07-18 DIAGNOSIS — M54.16 LUMBAR RADICULOPATHY: Primary | ICD-10-CM

## 2024-07-18 RX ORDER — SODIUM CHLORIDE, SODIUM LACTATE, POTASSIUM CHLORIDE, CALCIUM CHLORIDE 600; 310; 30; 20 MG/100ML; MG/100ML; MG/100ML; MG/100ML
INJECTION, SOLUTION INTRAVENOUS CONTINUOUS
OUTPATIENT
Start: 2024-07-18

## 2024-08-05 ENCOUNTER — HOSPITAL ENCOUNTER (OUTPATIENT)
Dept: RADIOLOGY | Facility: HOSPITAL | Age: 44
Discharge: HOME | End: 2024-08-05
Attending: ANESTHESIOLOGY | Admitting: ANESTHESIOLOGY
Payer: COMMERCIAL

## 2024-08-05 ENCOUNTER — HOSPITAL ENCOUNTER (OUTPATIENT)
Facility: HOSPITAL | Age: 44
Discharge: HOME | End: 2024-08-05
Attending: ANESTHESIOLOGY | Admitting: ANESTHESIOLOGY
Payer: COMMERCIAL

## 2024-08-05 DIAGNOSIS — M54.50 LOWER BACK PAIN: ICD-10-CM

## 2024-08-05 DIAGNOSIS — M54.16 LUMBAR RADICULOPATHY: Primary | ICD-10-CM

## 2024-08-05 PROCEDURE — 62323 NJX INTERLAMINAR LMBR/SAC: CPT | Mod: PO | Performed by: ANESTHESIOLOGY

## 2024-08-05 PROCEDURE — 62323 NJX INTERLAMINAR LMBR/SAC: CPT | Mod: ,,, | Performed by: ANESTHESIOLOGY

## 2024-08-05 PROCEDURE — 25500020 PHARM REV CODE 255: Mod: PO | Performed by: ANESTHESIOLOGY

## 2024-08-05 PROCEDURE — 63600175 PHARM REV CODE 636 W HCPCS: Mod: PO | Performed by: ANESTHESIOLOGY

## 2024-08-05 PROCEDURE — 25000003 PHARM REV CODE 250: Mod: PO | Performed by: ANESTHESIOLOGY

## 2024-08-05 RX ORDER — LIDOCAINE HYDROCHLORIDE 10 MG/ML
INJECTION, SOLUTION EPIDURAL; INFILTRATION; INTRACAUDAL; PERINEURAL
Status: DISCONTINUED | OUTPATIENT
Start: 2024-08-05 | End: 2024-08-05 | Stop reason: HOSPADM

## 2024-08-05 RX ORDER — MIDAZOLAM HYDROCHLORIDE 1 MG/ML
INJECTION INTRAMUSCULAR; INTRAVENOUS
Status: DISCONTINUED | OUTPATIENT
Start: 2024-08-05 | End: 2024-08-05 | Stop reason: HOSPADM

## 2024-08-05 RX ORDER — TRIAMCINOLONE ACETONIDE 40 MG/ML
INJECTION, SUSPENSION INTRA-ARTICULAR; INTRAMUSCULAR
Status: DISCONTINUED | OUTPATIENT
Start: 2024-08-05 | End: 2024-08-05 | Stop reason: HOSPADM

## 2024-08-05 RX ORDER — BUPIVACAINE HYDROCHLORIDE 2.5 MG/ML
INJECTION, SOLUTION EPIDURAL; INFILTRATION; INTRACAUDAL
Status: DISCONTINUED | OUTPATIENT
Start: 2024-08-05 | End: 2024-08-05 | Stop reason: HOSPADM

## 2024-08-05 RX ORDER — SODIUM CHLORIDE, SODIUM LACTATE, POTASSIUM CHLORIDE, CALCIUM CHLORIDE 600; 310; 30; 20 MG/100ML; MG/100ML; MG/100ML; MG/100ML
INJECTION, SOLUTION INTRAVENOUS CONTINUOUS
Status: DISCONTINUED | OUTPATIENT
Start: 2024-08-05 | End: 2024-08-05 | Stop reason: HOSPADM

## 2024-08-05 RX ADMIN — SODIUM CHLORIDE, POTASSIUM CHLORIDE, SODIUM LACTATE AND CALCIUM CHLORIDE: 600; 310; 30; 20 INJECTION, SOLUTION INTRAVENOUS at 10:08

## 2024-08-06 VITALS
WEIGHT: 171 LBS | BODY MASS INDEX: 25.33 KG/M2 | OXYGEN SATURATION: 97 % | HEART RATE: 71 BPM | TEMPERATURE: 98 F | RESPIRATION RATE: 18 BRPM | DIASTOLIC BLOOD PRESSURE: 71 MMHG | SYSTOLIC BLOOD PRESSURE: 113 MMHG | HEIGHT: 69 IN

## 2024-08-21 RX ORDER — ESOMEPRAZOLE MAGNESIUM 40 MG/1
40 CAPSULE, DELAYED RELEASE ORAL
Qty: 90 CAPSULE | Refills: 1 | Status: SHIPPED | OUTPATIENT
Start: 2024-08-21

## 2024-08-28 ENCOUNTER — TELEPHONE (OUTPATIENT)
Dept: RHEUMATOLOGY | Facility: CLINIC | Age: 44
End: 2024-08-28
Payer: COMMERCIAL

## 2024-08-29 ENCOUNTER — OFFICE VISIT (OUTPATIENT)
Dept: PAIN MEDICINE | Facility: CLINIC | Age: 44
End: 2024-08-29
Payer: COMMERCIAL

## 2024-08-29 ENCOUNTER — LAB VISIT (OUTPATIENT)
Dept: LAB | Facility: HOSPITAL | Age: 44
End: 2024-08-29
Attending: ORTHOPAEDIC SURGERY
Payer: COMMERCIAL

## 2024-08-29 VITALS
HEART RATE: 81 BPM | SYSTOLIC BLOOD PRESSURE: 136 MMHG | BODY MASS INDEX: 25.72 KG/M2 | HEIGHT: 69 IN | WEIGHT: 173.63 LBS | DIASTOLIC BLOOD PRESSURE: 84 MMHG

## 2024-08-29 DIAGNOSIS — M51.36 DISCOGENIC LOW BACK PAIN: ICD-10-CM

## 2024-08-29 DIAGNOSIS — M54.51 VERTEBROGENIC LOW BACK PAIN: ICD-10-CM

## 2024-08-29 DIAGNOSIS — M19.90 INFLAMMATORY ARTHROPATHY: ICD-10-CM

## 2024-08-29 DIAGNOSIS — M54.16 LUMBAR RADICULOPATHY: Primary | ICD-10-CM

## 2024-08-29 DIAGNOSIS — M19.90 INFLAMMATORY ARTHROPATHY: Primary | ICD-10-CM

## 2024-08-29 DIAGNOSIS — M79.18 MYOFASCIAL PAIN: ICD-10-CM

## 2024-08-29 DIAGNOSIS — M47.817 SPONDYLOSIS OF LUMBOSACRAL REGION, UNSPECIFIED SPINAL OSTEOARTHRITIS COMPLICATION STATUS: ICD-10-CM

## 2024-08-29 LAB
BASOPHILS # BLD AUTO: 0.03 K/UL (ref 0–0.2)
BASOPHILS NFR BLD: 0.4 % (ref 0–1.9)
CCP AB SER IA-ACNC: <0.5 U/ML
CRP SERPL-MCNC: 1.1 MG/L (ref 0–8.2)
DIFFERENTIAL METHOD BLD: ABNORMAL
EOSINOPHIL # BLD AUTO: 0.1 K/UL (ref 0–0.5)
EOSINOPHIL NFR BLD: 1.1 % (ref 0–8)
ERYTHROCYTE [DISTWIDTH] IN BLOOD BY AUTOMATED COUNT: 12.3 % (ref 11.5–14.5)
ERYTHROCYTE [SEDIMENTATION RATE] IN BLOOD BY PHOTOMETRIC METHOD: 4 MM/HR (ref 0–23)
HCT VFR BLD AUTO: 49.2 % (ref 40–54)
HGB BLD-MCNC: 17.3 G/DL (ref 14–18)
IMM GRANULOCYTES # BLD AUTO: 0.02 K/UL (ref 0–0.04)
IMM GRANULOCYTES NFR BLD AUTO: 0.3 % (ref 0–0.5)
LYMPHOCYTES # BLD AUTO: 1.6 K/UL (ref 1–4.8)
LYMPHOCYTES NFR BLD: 20.9 % (ref 18–48)
MCH RBC QN AUTO: 31.5 PG (ref 27–31)
MCHC RBC AUTO-ENTMCNC: 35.2 G/DL (ref 32–36)
MCV RBC AUTO: 90 FL (ref 82–98)
MONOCYTES # BLD AUTO: 0.6 K/UL (ref 0.3–1)
MONOCYTES NFR BLD: 7.7 % (ref 4–15)
NEUTROPHILS # BLD AUTO: 5.2 K/UL (ref 1.8–7.7)
NEUTROPHILS NFR BLD: 69.6 % (ref 38–73)
NRBC BLD-RTO: 0 /100 WBC
PLATELET # BLD AUTO: 283 K/UL (ref 150–450)
PMV BLD AUTO: 10.3 FL (ref 9.2–12.9)
RBC # BLD AUTO: 5.5 M/UL (ref 4.6–6.2)
WBC # BLD AUTO: 7.41 K/UL (ref 3.9–12.7)

## 2024-08-29 PROCEDURE — 99999 PR PBB SHADOW E&M-EST. PATIENT-LVL III: CPT | Mod: PBBFAC,,,

## 2024-08-29 PROCEDURE — 86200 CCP ANTIBODY: CPT | Performed by: ORTHOPAEDIC SURGERY

## 2024-08-29 PROCEDURE — 1159F MED LIST DOCD IN RCRD: CPT | Mod: CPTII,S$GLB,,

## 2024-08-29 PROCEDURE — 3079F DIAST BP 80-89 MM HG: CPT | Mod: CPTII,S$GLB,,

## 2024-08-29 PROCEDURE — 3075F SYST BP GE 130 - 139MM HG: CPT | Mod: CPTII,S$GLB,,

## 2024-08-29 PROCEDURE — 86140 C-REACTIVE PROTEIN: CPT | Performed by: ORTHOPAEDIC SURGERY

## 2024-08-29 PROCEDURE — 85652 RBC SED RATE AUTOMATED: CPT | Performed by: ORTHOPAEDIC SURGERY

## 2024-08-29 PROCEDURE — 86038 ANTINUCLEAR ANTIBODIES: CPT | Performed by: ORTHOPAEDIC SURGERY

## 2024-08-29 PROCEDURE — 3008F BODY MASS INDEX DOCD: CPT | Mod: CPTII,S$GLB,,

## 2024-08-29 PROCEDURE — 36415 COLL VENOUS BLD VENIPUNCTURE: CPT | Mod: PO | Performed by: ORTHOPAEDIC SURGERY

## 2024-08-29 PROCEDURE — 86431 RHEUMATOID FACTOR QUANT: CPT | Performed by: ORTHOPAEDIC SURGERY

## 2024-08-29 PROCEDURE — 85025 COMPLETE CBC W/AUTO DIFF WBC: CPT | Performed by: ORTHOPAEDIC SURGERY

## 2024-08-29 PROCEDURE — 99214 OFFICE O/P EST MOD 30 MIN: CPT | Mod: S$GLB,,,

## 2024-08-29 NOTE — PROGRESS NOTES
[facility] Follow Up Evaluation      CC:   Chief Complaint   Patient presents with    Back Pain            8/29/2024     3:49 PM 7/11/2024    12:57 PM 7/11/2024    12:54 PM   Last 3 PDI Scores   Pain Disability Index (PDI) 41 36 36       Interval HPI 8/29/2024: Jorge Bae III returns to the office for follow up.  He is s/p caudal JONNATHAN on 08/05/2024 with 0% relief.  Today he is reporting continued lower back pain, 6/10, worsened with certain movements and certain positions, such as leaning forward at the waist.  Pain is mainly located in his lower back with some radiation into bilateral buttocks, right greater than left.  No pain radiating in his legs, numbness, weakness or any new changes to his bowel or bladder function.  He continues to take muscle relaxers and NSAIDs without significant relief.          Pain intervention history:   - s/p bilateral L4/5 and L5/S1 facet joint injections on 11/3/21 on the [place]  - s/p L5-S1 on 07/22/2022 and reports 85-90% relief   - s/p bilateral L5-S1 TFESI on 08/16/2022 with over 90% relief  - s/p bilateral L5-S1 TFESI on 7/23/2023  - diagnostic bilateral L4/5 and L5/S1 medial branch blocks on 6/12/24 on the [facility]  - s/p caudal JONNATHAN on 08/05/2024 with 0% relief.      HPI:   Jorge Bae III is a 43 y.o. male who presents with back pain.  He has had chronic lower back pain for the past few years however he reports that a couple days ago he was playing golf and experienced sharp, sudden severe lower back pain.  Today he reports his pain is 8/10, aching, sharp with radiation into his bilateral buttocks and down the back of his his thighs.  He denies any numbness or weakness.  His pain is worse with standing and walking and relieved with sitting and lying down.    History:    Current Outpatient Medications:     atomoxetine (STRATTERA) 25 MG capsule, Take 25 mg by mouth 2 (two) times daily., Disp: , Rfl:     azelastine (ASTELIN) 137 mcg (0.1 %)  nasal spray, 1 spray (137 mcg total) by Nasal route 2 (two) times daily., Disp: 30 mL, Rfl: 11    baclofen (LIORESAL) 20 MG tablet, Take 20 mg by mouth 3 (three) times daily., Disp: , Rfl:     buPROPion (WELLBUTRIN XL) 300 MG 24 hr tablet, Take 300 mg by mouth once daily., Disp: , Rfl:     celecoxib (CELEBREX) 200 MG capsule, Take 1 capsule (200 mg total) by mouth daily as needed for Pain., Disp: 40 capsule, Rfl: 1    esomeprazole (NEXIUM) 40 MG capsule, TAKE 1 CAPSULE BY MOUTH EVERY DAY, Disp: 90 capsule, Rfl: 1    fluticasone propionate (FLONASE) 50 mcg/actuation nasal spray, 2 sprays (100 mcg total) by Each Nostril route 2 (two) times a day., Disp: 16 g, Rfl: 11    methocarbamoL (ROBAXIN) 750 MG Tab, Take 1 tablet (750 mg total) by mouth 4 (four) times daily as needed., Disp: 44 tablet, Rfl: 3    ondansetron (ZOFRAN-ODT) 4 MG TbDL, Take 4 mg by mouth every 6 (six) hours as needed., Disp: , Rfl:     testosterone cypionate (DEPOTESTOTERONE CYPIONATE) 200 mg/mL injection, Inject 150 mg into the muscle every 7 days., Disp: , Rfl:     tiZANidine (ZANAFLEX) 4 MG tablet, TAKE 1 TABLET BY MOUTH EVERY DAY IN THE EVENING AS NEEDED FOR MUSCLE SPASMS, Disp: 90 tablet, Rfl: 1    Past Medical History:   Diagnosis Date    Depression     Hematuria     Testosterone deficiency        Past Surgical History:   Procedure Laterality Date    CAUDAL EPIDURAL STEROID INJECTION N/A 8/5/2024    Procedure: Injection-steroid-epidural-caudal;  Surgeon: Reno Yu MD;  Location: [facility] OR;  Service: Pain Management;  Laterality: N/A;    deviated septum repair      EPIDURAL STEROID INJECTION INTO LUMBAR SPINE N/A 7/22/2022    Procedure: Injection-steroid-epidural-lumbar L5/S1;  Surgeon: Reno Yu MD;  Location: [facility] OR;  Service: Pain Management;  Laterality: N/A;    ESOPHAGOGASTRODUODENOSCOPY N/A 11/11/2020    Procedure: EGD (ESOPHAGOGASTRODUODENOSCOPY);  Surgeon: Yunior Stevens MD;  Location: [facility];  Service: Endoscopy;   Laterality: N/A;    ESOPHAGOGASTRODUODENOSCOPY N/A 12/4/2023    Procedure: EGD (ESOPHAGOGASTRODUODENOSCOPY);  Surgeon: Mike Chambers MD;  Location: [facility];  Service: Gastroenterology;  Laterality: N/A;    INJECTION OF ANESTHETIC AGENT AROUND NERVE Bilateral 6/12/2024    Procedure: BLOCK, NERVE BILATERAL L3, 4, 5 MEDIAL BRANCH;  Surgeon: Mateus Vargas MD;  Location: [facility];  Service: Pain Management;  Laterality: Bilateral;  [phone]    INJECTION OF FACET JOINT Bilateral 11/3/2021    Procedure: INJECTION, FACET JOINT BILATERAL L4/5, L5/S1;  Surgeon: Mateus Vargas MD;  Location: [facility];  Service: Pain Management;  Laterality: Bilateral;    TRANSFORAMINAL EPIDURAL INJECTION OF STEROID Bilateral 8/16/2022    Procedure: Injection,steroid,epidural,transforaminal approach L5/S1;  Surgeon: Reno Yu MD;  Location: [facility] OR;  Service: Pain Management;  Laterality: Bilateral;    TRANSFORAMINAL EPIDURAL INJECTION OF STEROID Bilateral 7/7/2023    Procedure: Injection,steroid,epidural,transforaminal approach;  Surgeon: Reno Yu MD;  Location: [facility];  Service: Pain Management;  Laterality: Bilateral;  L5/S1       Family History   Problem Relation Name Age of Onset    Alzheimer's disease Mother Mirela Bae     Early death Mother Mirela Bae     No Known Problems Father      Heart disease Maternal Grandfather Roge Hi     Glaucoma Neg Hx      Macular degeneration Neg Hx      Retinal detachment Neg Hx         Social History     Socioeconomic History    Marital status:    Tobacco Use    Smoking status: Never    Smokeless tobacco: Never   Substance and Sexual Activity    Alcohol use: Not Currently     Alcohol/week: 10.0 standard drinks of alcohol     Types: 10 Glasses of wine per week     Comment: stopped in 2021, 4-6 times per week, 1/2 wine bottle per night    Drug use: Not Currently     Types: Amphetamines    Sexual activity: Yes     Partners: Female     Birth  control/protection: None     Comment: partner has Mirena   Social History Narrative    January Barragan    Zia 16 mos    Works as .     Social Determinants of Health     Financial Resource Strain: Low Risk  (4/23/2024)    Overall Financial Resource Strain (CARDIA)     Difficulty of Paying Living Expenses: Not hard at all   Food Insecurity: No Food Insecurity (4/23/2024)    Hunger Vital Sign     Worried About Running Out of Food in the Last Year: Never true     Ran Out of Food in the Last Year: Never true   Transportation Needs: Unmet Transportation Needs (4/23/2024)    PRAPARE - Transportation     Lack of Transportation (Medical): Yes     Lack of Transportation (Non-Medical): No   Physical Activity: Sufficiently Active (4/23/2024)    Exercise Vital Sign     Days of Exercise per Week: 7 days     Minutes of Exercise per Session: 70 min   Stress: No Stress Concern Present (4/23/2024)    [employer] [place] [employer] - Occupational Stress Questionnaire     Feeling of Stress : Only a little   Housing Stability: Low Risk  (2/4/2022)    Housing Stability Vital Sign     Unable to Pay for Housing in the Last Year: No     Number of Places Lived in the Last Year: 1     Unstable Housing in the Last Year: No       Review of patient's allergies indicates:  No Known Allergies    Review of Systems:  General ROS: negative for - fever  Psychological ROS: negative for - hostility  Hematological and Lymphatic ROS: negative for - bleeding problems  Endocrine ROS: negative for - unexpected weight changes  Respiratory ROS: no cough, shortness of breath, or wheezing  Cardiovascular ROS: no chest pain or dyspnea on exertion  Gastrointestinal ROS: no abdominal pain, change in bowel habits, or black or bloody stools  Musculoskeletal ROS: negative for - muscular weakness  Neurological ROS: negative for - numbness/tingling  Dermatological ROS: negative for rash    Physical Exam:  Vitals:    08/29/24 1550   BP: 136/84   Pulse:  "81   Weight: 78.8 kg (173 lb 9.8 oz)   Height: 5' 9" (1.753 m)   PainSc:   6   PainLoc: Back         Body mass index is 25.64 kg/m².    Gen: NAD  Gait: gait intact  Psych:  Mood appropriate for given condition  HEENT: eyes anicteric   GI: Abd soft  CV: RRR  Lungs: breathing unlabored   Sensation: intact to light touch in all dermatomes tested from L2-S1 bilaterally  Reflexes: 2+ b/l patella and achilles  Tone: normal in the b/l knees and hips   Skin: intact  Extremities: No edema in b/l ankles or hands  Provacative tests:         Imaging:  MRI lumbar spine 11/9/21  FINDINGS:  The lumbar spine demonstrates proper alignment. The vertebral bodies show normal signal intensity and height with no indication of acute fracture or pathologic marrow replacement process.  Multilevel disc desiccation most prominent L5-S1.     The demonstrated portion of the spinal cord is normal in signal intensity at all levels with no indication of myelomalacia or cord edema. Conus terminates at L1.  Evaluation of the surrounding soft tissue structures demonstrates no acute abnormality.     Degenerative findings:     T12-L1: No significant spinal canal stenosis or neural foraminal narrowing.  L1-L2: No significant spinal canal stenosis or neural foraminal narrowing.  L2-L3: No significant spinal canal stenosis or neural foraminal narrowing.  L3-L4: Mild right facet arthropathy.  No canal or foraminal stenosis.  L4-L5: Disc bulge with central annular fissure.  Bilateral facet arthropathy.  No canal stenosis.  Mild bilateral foraminal stenosis.  L5-S1: Disc bulge with central extrusion and cranial migration.  Mild canal stenosis.  Moderate bilateral foraminal stenosis.  Mild edema in the posterior aspect of the L5 inferior endplate.    MRI lumbar spine 8/13/22  FINDINGS:  The marrow signal is within normal limits.  The conus lies at L1 vertebral body level.  L1/2: There are no significant degenerative changes at this level.     L2/3: There are " no significant degenerative changes at this level.  L3/4: There are no significant degenerative changes at this level.  L4/5: There is a posteriorly oriented disc protrusion with small annular tear.  L5/S1: There is a posteriorly oriented disc extrusion with moderate narrowing of bilateral neural foramina.    MRI lumbar spine 5/1/24  FINDINGS:  NOMENCLATURE: Five lumbar type vertebral bodies.     CORD/CAUDA EQUINA: Conus has normal size and signal and ends at a normal level of L1-L2.     ALIGNMENT: Normal.     BONES: Vertebral body heights are maintained.  Prominent type 1 endplate changes at L5-S1.  No aggressive bone marrow signal.     PARASPINAL AREA: Normal.     LUMBAR DISC LEVELS:  T12-L1: No disc herniation or significant posterior osteophytic ridging.  No significant spinal canal or foraminal stenosis.  L1-L2: No disc herniation or significant posterior osteophytic ridging.  No significant spinal canal or foraminal stenosis.  L2-L3: No disc herniation or significant posterior osteophytic ridging.  Minimal right greater than left facet hypertrophy.  No significant spinal canal or foraminal stenosis.  L3-L4: Minimal disc bulge..  Mild right and minimal left facet hypertrophy.  Ligamentum flavum thickening.  Minimal narrowing of the bilateral lateral recesses.  No significant spinal canal or foraminal stenosis.  L4-L5: Mild disc bulge with tiny central protrusion with high-intensity zone in the protrusion.  Slight disc height loss with loss of normal T2 signal in the disc.  Minimal bilateral facet hypertrophy.  Mild-moderate narrowing of the bilateral lateral recesses, unchanged.  No significant spinal canal stenosis.  Mild bilateral foraminal stenosis, unchanged.  L5-S1: Mild disc bulge with superimposed central extrusion migrating cranially to the infra pedicle level, similar in size compared to prior study.  Disc height loss and loss of normal T2 signal in the disc.  Mild-moderate narrowing of the bilateral  lateral recesses.  No significant spinal canal stenosis.  Mild bilateral foraminal stenosis, unchanged.    MRI cervical spine 5/1/24  FINDINGS:  CORD: Normal size and signal.  No syrinx.  Cervicomedullary junction is normal.  Incidental small left foraminal perineural cyst at T1-2.     ALIGNMENT: Normal.  Lateral masses of C1 and C2 are congruent.     BONES: Vertebral body heights are maintained.  No aggressive bone marrow signal.     PARASPINAL AREA: Normal.     CERVICAL DISC LEVELS:  C2-C3: Mild disc osteophyte complex.  Moderate left facet hypertrophy.  Preserved ventral and dorsal CSF surrounding the cord.  Moderate-severe left and mild-moderate right foraminal stenosis.  C3-C4: Mild disc osteophyte complex.  Mild bilateral facet hypertrophy.  Preserved ventral and dorsal CSF surrounding the cord.  Mild bilateral foraminal stenosis.  C4-C5: Mild disc osteophyte complex, eccentric to the right.  Minimal right facet hypertrophy.  Slight ventral cord flattening with preserved ventral and dorsal CSF surrounding the cord.  Mild-moderate right foraminal stenosis.  C5-C6: Moderate disc osteophyte complex with right lateral recess soft disc extrusion.  Mild-moderate right ventral cord flattening with effacement of ventral and thin sliver preserved dorsal CSF surrounding the cord.  Moderate right and mild left foraminal stenosis.  C6-C7: Mild disc osteophyte complex with right paracentral likely soft disc extrusion.  Mild bilateral facet hypertrophy.  Slight ventral cord flattening with thin sliver preserved ventral and preserved dorsal CSF surrounding the cord.  Moderate right and mild-moderate left foraminal stenosis.  C7-T1: Mild disc osteophyte complex.  Mild left and minimal right facet hypertrophy.  Preserved ventral and dorsal CSF surrounding the cord.  Mild left foraminal stenosis.      MRI left shoulder 5/1/24  Impression:  1. Low-grade muscle strain of the infraspinatus.  2. Supraspinatus tendinopathy with  partial thickness articular surface tear with likely interstitial tear extension.  3. Mild infraspinatus tendinosis.  4. Isolated distal clavicle marrow edema with subcortical cystic change, adjacent soft tissue edema, and trace AC joint fluid.  Correlate for distal clavicle osteolysis.    Labs:  BMP  Lab Results   Component Value Date     03/29/2024    K 4.1 03/29/2024     03/29/2024    CO2 26 03/29/2024    BUN 31 (H) 03/29/2024    CREATININE 1.10 03/29/2024    CALCIUM 9.3 03/29/2024    ANIONGAP 8 03/29/2024    ESTGFRAFRICA >60 02/14/2022    EGFRNONAA >60 02/14/2022     Lab Results   Component Value Date    ALT 56 (H) 03/29/2024    AST 65 (H) 03/29/2024    ALKPHOS 97 03/29/2024    BILITOT 0.5 03/29/2024       Assessment:   Problem List Items Addressed This Visit    None  Visit Diagnoses       Lumbar radiculopathy    -  Primary    Myofascial pain        Vertebrogenic low back pain        Discogenic low back pain        Spondylosis of lumbosacral region, unspecified spinal osteoarthritis complication status                    43 y.o. year old male who presents with back pain.       8/29/2024: Jorge Bae III returns to the office for follow up.  He is s/p caudal JONNATHAN on 08/05/2024 with 0% relief.  Today he is reporting continued lower back pain, 6/10, worsened with certain movements and certain positions, such as leaning forward at the waist.  Pain is mainly located in his lower back with some radiation into bilateral buttocks, right greater than left.  No pain radiating in his legs, numbness, weakness or any new changes to his bowel or bladder function.  He continues to take muscle relaxers and NSAIDs without significant relief.        -  He is s/p caudal JONNATHAN on 08/05/2024 with 0% relief.  - we reviewed his lumbar MRI in office today and is consistent with multilevel bilateral facet arthropathy.  At L5-S1 he has central disc extrusion and mild-to-moderate narrowing of the bilateral lateral recess.   At L4-5 he has a small central disc protrusion with annular fissure.  He has type 1 [place] endplate changes at L5-S1 and degenerative disc disease at L5-S1  - unfortunately, he has failed to find significant relief with multiple interventions including facet joint injections, multiple lumbar epidurals, SI joint injection and trigger point injections.  - he has been evaluated by Neurosurgery but would like to hold off on any surgical interventions at this time.  - I believe majority of his pain is likely originating from the extrusion at L5/S1 however he may be having some vertebrogenic pain as well  - at this time, he would like to continue with maximizing conservative therapy, he will continue with at-home PT directed exercises, NSAIDs, muscle relaxers.  - he is following up with Rheumatology for evaluation of potential ankylosing spondylitis  - follow up as needed, if low back pain persists next interventional consideration would be intercept at L5/S1.      : Not applicable      This note was completed with dictation software and grammatical errors may exist.

## 2024-08-30 LAB
ANA SER QL IF: NORMAL
RHEUMATOID FACT SERPL-ACNC: <13 IU/ML (ref 0–15)

## 2024-09-14 RX ORDER — METHOCARBAMOL 750 MG/1
750 TABLET, FILM COATED ORAL 4 TIMES DAILY PRN
Qty: 44 TABLET | Refills: 3 | Status: SHIPPED | OUTPATIENT
Start: 2024-09-14

## 2024-09-14 RX ORDER — TIZANIDINE 4 MG/1
4 TABLET ORAL EVERY 8 HOURS
Qty: 90 TABLET | Refills: 3 | Status: SHIPPED | OUTPATIENT
Start: 2024-09-14 | End: 2025-01-12

## 2024-09-30 RX ORDER — TIZANIDINE 4 MG/1
4 TABLET ORAL EVERY 8 HOURS
Qty: 270 TABLET | Refills: 1 | Status: SHIPPED | OUTPATIENT
Start: 2024-09-30

## 2024-10-21 ENCOUNTER — TELEPHONE (OUTPATIENT)
Dept: RHEUMATOLOGY | Facility: CLINIC | Age: 44
End: 2024-10-21
Payer: COMMERCIAL

## 2024-10-21 ENCOUNTER — OFFICE VISIT (OUTPATIENT)
Dept: RHEUMATOLOGY | Facility: CLINIC | Age: 44
End: 2024-10-21
Payer: COMMERCIAL

## 2024-10-21 VITALS
DIASTOLIC BLOOD PRESSURE: 83 MMHG | BODY MASS INDEX: 26.29 KG/M2 | SYSTOLIC BLOOD PRESSURE: 142 MMHG | HEART RATE: 77 BPM | WEIGHT: 178 LBS

## 2024-10-21 DIAGNOSIS — R53.83 MALAISE AND FATIGUE: ICD-10-CM

## 2024-10-21 DIAGNOSIS — H15.103 SCLERITIS AND EPISCLERITIS OF BOTH EYES: ICD-10-CM

## 2024-10-21 DIAGNOSIS — M46.1 BILATERAL SACROILIITIS: ICD-10-CM

## 2024-10-21 DIAGNOSIS — M45.0 ANKYLOSING SPONDYLITIS OF MULTIPLE SITES IN SPINE: Primary | ICD-10-CM

## 2024-10-21 DIAGNOSIS — R53.81 MALAISE AND FATIGUE: ICD-10-CM

## 2024-10-21 DIAGNOSIS — H15.003 SCLERITIS AND EPISCLERITIS OF BOTH EYES: ICD-10-CM

## 2024-10-21 PROCEDURE — 3079F DIAST BP 80-89 MM HG: CPT | Mod: CPTII,S$GLB,, | Performed by: INTERNAL MEDICINE

## 2024-10-21 PROCEDURE — 3008F BODY MASS INDEX DOCD: CPT | Mod: CPTII,S$GLB,, | Performed by: INTERNAL MEDICINE

## 2024-10-21 PROCEDURE — 1159F MED LIST DOCD IN RCRD: CPT | Mod: CPTII,S$GLB,, | Performed by: INTERNAL MEDICINE

## 2024-10-21 PROCEDURE — 1160F RVW MEDS BY RX/DR IN RCRD: CPT | Mod: CPTII,S$GLB,, | Performed by: INTERNAL MEDICINE

## 2024-10-21 PROCEDURE — 99205 OFFICE O/P NEW HI 60 MIN: CPT | Mod: S$GLB,,, | Performed by: INTERNAL MEDICINE

## 2024-10-21 PROCEDURE — 3077F SYST BP >= 140 MM HG: CPT | Mod: CPTII,S$GLB,, | Performed by: INTERNAL MEDICINE

## 2024-10-21 PROCEDURE — 99999 PR PBB SHADOW E&M-EST. PATIENT-LVL III: CPT | Mod: PBBFAC,,, | Performed by: INTERNAL MEDICINE

## 2024-10-21 RX ORDER — ADALIMUMAB 40MG/0.4ML
40 KIT SUBCUTANEOUS
Qty: 2 PEN | Refills: 11 | Status: ACTIVE | OUTPATIENT
Start: 2024-10-21

## 2024-10-21 ASSESSMENT — ROUTINE ASSESSMENT OF PATIENT INDEX DATA (RAPID3)
FATIGUE SCORE: 1.1
PSYCHOLOGICAL DISTRESS SCORE: 2.2
MDHAQ FUNCTION SCORE: 0.8
TOTAL RAPID3 SCORE: 5.22
PAIN SCORE: 7
PATIENT GLOBAL ASSESSMENT SCORE: 6

## 2024-10-21 NOTE — PROGRESS NOTES
Subjective:          Chief Complaint: Jorge Bae III is a 44 y.o. male who had no chief complaint listed for this encounter.    HPI:    44-year-old gentleman who was initially seen from what I can tell with the spine services proximally 2021 for progressive low back pain.  He has specific recall for 2 previous episodes in college with a flare of his back these remitted with time.  Since 2021 he has had extensive lumbar interventional pain procedures with varying degrees of benefit, but over the last at least 6-9 months he has had a diminishing return with these procedures.  These include transforaminal epidural steroid injections facet injections median branch blocks and caudal blocks as well as SI injections    Today he localizes his pain just below the belt line/sacral region as he did in 2021 with Dr. Mendez only the frequency and intensity have progressed. Daily stiffness, waking at night now with any movement due to discomfort. exacerbated with some activity but does benefit from moving around frequently. He continues with a regular exercise regimen to maintain core strength and mobility.  Significant limitation with motion including bending at the waist. he has difficulty with stiffness when trying to pull on pants.  More recently he has cervical spine stiffness and pain. Updated imaging for this with a significant degree of degenerative disc disease as well as neural foraminal stenosis.    Discussion with the patient prior to seeing him was a concern that some of his stiffness might be out of proportion to the mechanical changes in his lumbar spine. Dr. Steinberg kindly ordered an HLA B27 which was negative.  Patient's inflammatory markers were normal.  Rheumatoid factor and CCP as well as an CA were negative.  Patient does note for at least the last 2 years an intolerance to wearing his contacts-his eyes become gritty painful and injected.  No prior history of uveitis or iritis  He has no history of  psoriasis in himself or family. No history of ulcerative colitis in or Crohn's disease in himself or family, but there was question of mild enteritis on a CT of abdomen from November of 2023.  He has noted for the last few months daily morning diarrhea but no hematochezia.   Patient denies fevers, night sweats, unexplained weight loss.       Component      Latest Ref Rng 8/29/2024   HLA B27 Interpretation TAQ: [id number]    B27 Testing Date 09/16/2024 01:12 PM    HLA B27 Result Negative    Sed Rate      0 - 23 mm/Hr 4    CRP      0.0 - 8.2 mg/L 1.1    CA Screen      Negative <1:80  Negative <1:80    CCP Antibodies      <5.0 U/mL <0.5    Rheumatoid Factor      0.0 - 15.0 IU/mL <13.0        REVIEW OF SYSTEMS:    Review of Systems   Constitutional:  Negative for fever, malaise/fatigue and weight loss.   HENT:  Negative for sore throat.    Eyes:  Positive for redness. Negative for double vision and photophobia.   Respiratory:  Negative for cough, shortness of breath and wheezing.    Cardiovascular:  Negative for chest pain, palpitations and orthopnea.   Gastrointestinal:  Positive for diarrhea. Negative for abdominal pain and constipation.   Genitourinary:  Negative for dysuria, hematuria and urgency.   Musculoskeletal:  Positive for back pain and joint pain. Negative for myalgias.   Skin:  Negative for rash.   Neurological:  Negative for dizziness, tingling, focal weakness and headaches.   Endo/Heme/Allergies:  Does not bruise/bleed easily.   Psychiatric/Behavioral:  Negative for depression, hallucinations and suicidal ideas.                Objective:            Past Medical History:   Diagnosis Date    Depression     Hematuria     Testosterone deficiency      Family History   Problem Relation Name Age of Onset    Alzheimer's disease Mother Mirela Bae     Early death Mother Mirela Bae     No Known Problems Father      Heart disease Maternal Grandfather Roge Do     Glaucoma Neg Hx      Macular degeneration Neg  Hx      Retinal detachment Neg Hx       Social History     Tobacco Use    Smoking status: Never    Smokeless tobacco: Never   Substance Use Topics    Alcohol use: Not Currently     Alcohol/week: 10.0 standard drinks of alcohol     Types: 10 Glasses of wine per week     Comment: stopped in 2021, 4-6 times per week, 1/2 wine bottle per night    Drug use: Not Currently     Types: Amphetamines         Current Outpatient Medications on File Prior to Visit   Medication Sig Dispense Refill    atomoxetine (STRATTERA) 25 MG capsule Take 25 mg by mouth 2 (two) times daily.      azelastine (ASTELIN) 137 mcg (0.1 %) nasal spray 1 spray (137 mcg total) by Nasal route 2 (two) times daily. 30 mL 11    baclofen (LIORESAL) 20 MG tablet Take 20 mg by mouth 3 (three) times daily.      buPROPion (WELLBUTRIN XL) 300 MG 24 hr tablet Take 300 mg by mouth once daily.      celecoxib (CELEBREX) 200 MG capsule Take 1 capsule (200 mg total) by mouth daily as needed for Pain. 40 capsule 1    esomeprazole (NEXIUM) 40 MG capsule TAKE 1 CAPSULE BY MOUTH EVERY DAY 90 capsule 1    fluticasone propionate (FLONASE) 50 mcg/actuation nasal spray 2 sprays (100 mcg total) by Each Nostril route 2 (two) times a day. 16 g 11    methocarbamoL (ROBAXIN) 750 MG Tab Take 1 tablet (750 mg total) by mouth 4 (four) times daily as needed. 44 tablet 3    ondansetron (ZOFRAN-ODT) 4 MG TbDL Take 4 mg by mouth every 6 (six) hours as needed.      testosterone cypionate (DEPOTESTOTERONE CYPIONATE) 200 mg/mL injection Inject 150 mg into the muscle every 7 days.      tiZANidine (ZANAFLEX) 4 MG tablet TAKE 1 TABLET BY MOUTH EVERY 8 HOURS. 270 tablet 1     No current facility-administered medications on file prior to visit.       There were no vitals filed for this visit.    Physical Exam:    Physical Exam  Constitutional:       Appearance: He is well-developed.   HENT:      Head: Atraumatic.      Nose: No nasal deformity.      Mouth/Throat:      Mouth: No oral lesions.       Dentition: No dental caries.   Eyes:      General: Lids are normal. No scleral icterus.     Conjunctiva/sclera:      Right eye: Right conjunctiva is injected.      Left eye: Left conjunctiva is injected.      Pupils: Pupils are equal, round, and reactive to light.   Neck:      Vascular: No JVD.   Cardiovascular:      Rate and Rhythm: Normal rate and regular rhythm.   Pulmonary:      Effort: Pulmonary effort is normal.      Breath sounds: Normal breath sounds.   Abdominal:      General: Bowel sounds are normal.      Palpations: Abdomen is soft.   Musculoskeletal:      Right shoulder: No effusion, tenderness or crepitus. Normal range of motion.      Left shoulder: No effusion, tenderness or crepitus. Normal range of motion.      Right elbow: No effusion. Normal range of motion. No tenderness.      Left elbow: No effusion. Normal range of motion. No tenderness.      Right wrist: No swelling or tenderness. Normal range of motion.      Left wrist: No swelling or tenderness. Normal range of motion.      Right hip: No tenderness. Normal range of motion.      Left hip: No tenderness. Normal range of motion.      Right knee: No swelling. Normal range of motion. No tenderness.      Left knee: No swelling. Normal range of motion. No tenderness.      Right ankle: No swelling. No tenderness. Normal range of motion.      Left ankle: No swelling. No tenderness. Normal range of motion.      Comments: Schobers is estimated with approx 2.5cm of excusion. He relies heavily on hip mobility to flex his spine.   Occiput to wall is WNL   He has restricted in rotation of the cervical spine is extension is slightly restricted.  Flexion is within normal limits    He has no evidence of any changes that would be consistent with dactylitis of the fingers is no swelling of the MCP PIP or D IP joints including the wrist elbows with full range of motion.   Lymphadenopathy:      Cervical: No cervical adenopathy.   Skin:     General: Skin is warm  and dry.      Comments: Patient has no nail pitting no evidence of psoriasis slight are erythema in the right olecranon not concerning               FINDINGS:  There is supraspinatus tendinopathy.  There is partial-thickness articular surface tear and likely interstitial tear present.  There is muscle edema demonstrated within the infraspinatus compatible with low-grade muscle strain..  The infraspinatus tendon is mildly tendinotic.  Teres minor tendon appears intact.  The subscapularis tendon appears intact.  The long head of the biceps tendon appears intact and is normally located within the bicipital groove.  There is no displaced labral tear identified within a nondistended joint.  There is marrow edema identified within the distal clavicle.  Subcortical cystic change identified.  There is adjacent soft tissue edema.  There is trace AC joint fluid.  This appearance is suspicious for distal clavicle osteolysis.  There is no subacromial subdeltoid bursitis.  There is no glenohumeral joint effusion.  There is no muscle atrophy.     Impression:     1. Low-grade muscle strain of the infraspinatus.  2. Supraspinatus tendinopathy with partial thickness articular surface tear with likely interstitial tear extension.  3. Mild infraspinatus tendinosis.  4. Isolated distal clavicle marrow edema with subcortical cystic change, adjacent soft tissue edema, and trace AC joint fluid.  Correlate for distal clavicle osteolysis.        Electronically signed by:Eliu Mai MD  Date:                                            05/01/2024  Time:                                           14:42  Assessment:       Encounter Diagnoses   Name Primary?    Ankylosing spondylitis of multiple sites in spine Yes    Bilateral sacroiliitis     Scleritis and episcleritis of both eyes     Malaise and fatigue           Plan:        Ankylosing spondylitis of multiple sites in spine  -     adalimumab (HUMIRA,CF, PEN) 40 mg/0.4 mL PnKt; Inject 0.4  mLs (40 mg total) into the skin every 14 (fourteen) days.  Dispense: 2 pen ; Refill: 11    Bilateral sacroiliitis  -     X-Ray Sacroiliac Joints 3 Views; Future; Expected date: 10/21/2024  -     adalimumab (HUMIRA,CF, PEN) 40 mg/0.4 mL PnKt; Inject 0.4 mLs (40 mg total) into the skin every 14 (fourteen) days.  Dispense: 2 pen ; Refill: 11    Scleritis and episcleritis of both eyes    Malaise and fatigue  -     Quantiferon Gold TB; Future; Expected date: 10/21/2024  -     Hepatitis Panel, Acute; Future; Expected date: 10/21/2024    Delightful gentleman that I have reviewed his MRI lumbar and cervical spine he has no signs of bony ankylosis at this time but I have a concern for  non radiographic ankylosing spondylitis at this time but I have some concerns for an underlying ankylosing spondylitis given his range of motion, episcleritis.   -he has a history of a colonoscopy no evidence of IBD although this could be hard to locate he had some abdominal pain with a CT confirming possible enteritis.   -he had a MRI of the shoulder that had suggestions of possible distal clavicle osteolysis which can be seen in inflammatory arthritis as well as trauma   -I reviewed the lumbar MRI with him, I do see L5-S1  STIR image some increased fluid signal of the distal L5 endplate in the proximal S1 endplate while this could be changes from degenerative disc disease this can also be seen in ankylosing spondylitis.  At the L3-L4 level he also has ligamentum flavum thickening which can be simultaneously seen in degenerative as well as inflammatory arthritis such as ankylosing spondylitis.    We had a long discussion about treatment options he is on chronic NSAIDs, PRN steroids with modest benefit,  and diminishing response with interventional pain management.  Given his scleritis (certainly could be allergies but this is new for him), persistent and progressive spinal stiffness with some radiographic changes, we discussed a trial with a  TNF inhibitor which would be the first-line agent for ankylosing spondylitis.  He is agreeable to try this and I recommend we start with Humira 40 mg subQ every 14 days for 12 weeks and reassess.     -I will have him obtain SI joint imaging plain radiographs as well as pre biologic labs tomorrow and I am submitting for Humira today.  He can continue with the Celebrex at this time as well as other muscle relaxers as needed and p.r.n. steroids as needed.    Diarrhea- no hematochezia at this time, no significant ABD pain/cramping. EGD 11/2023 with gastritis and pain subsided.  If diarrhea  continues we discussed having another eval with GI, but I do not think we need this immediately.     Elevated LAEs: full Hep eval no AIH.      No follow-ups on file.      F/u in 3months  Labs and xray tomorrow.   60min consultation with greater than 50% of that time included Preparing to see the patient (review records, tests), Obtaining and/or reviewing separately obtained historical data, Performing a medically appropriate examination and/or evaluation , Ordering medications, tests, and/or procedures, Referring and communicating with other healthcare professionals , Documenting clinical information in the electronic or other health record and Independently interpreting results  (as warranted) & communicating results to the patient/family/caregiver. All questions answered.  Thank you for allowing me to participate in the care of this very pleasant patient.

## 2024-10-22 ENCOUNTER — HOSPITAL ENCOUNTER (OUTPATIENT)
Dept: RADIOLOGY | Facility: HOSPITAL | Age: 44
Discharge: HOME | End: 2024-10-22
Attending: INTERNAL MEDICINE
Payer: COMMERCIAL

## 2024-10-22 DIAGNOSIS — M46.1 BILATERAL SACROILIITIS: ICD-10-CM

## 2024-10-22 PROCEDURE — 72200 X-RAY EXAM SI JOINTS: CPT | Mod: TC,FY,PO

## 2024-10-22 PROCEDURE — 72200 X-RAY EXAM SI JOINTS: CPT | Mod: 26,,, | Performed by: RADIOLOGY

## 2024-10-23 PROBLEM — M45.0 ANKYLOSING SPONDYLITIS OF MULTIPLE SITES IN SPINE: Status: ACTIVE | Noted: 2024-10-23

## 2024-10-29 ENCOUNTER — PATIENT MESSAGE (OUTPATIENT)
Dept: RHEUMATOLOGY | Facility: CLINIC | Age: 44
End: 2024-10-29
Payer: COMMERCIAL

## 2024-11-14 ENCOUNTER — OFFICE VISIT (OUTPATIENT)
Dept: PAIN MEDICINE | Facility: CLINIC | Age: 44
End: 2024-11-14
Payer: COMMERCIAL

## 2024-11-14 VITALS
BODY MASS INDEX: 26.35 KG/M2 | SYSTOLIC BLOOD PRESSURE: 138 MMHG | HEART RATE: 69 BPM | WEIGHT: 177.94 LBS | DIASTOLIC BLOOD PRESSURE: 88 MMHG | HEIGHT: 69 IN

## 2024-11-14 DIAGNOSIS — M51.360 DEGENERATION OF INTERVERTEBRAL DISC OF LUMBAR REGION WITH DISCOGENIC BACK PAIN: ICD-10-CM

## 2024-11-14 DIAGNOSIS — M54.16 LUMBAR RADICULOPATHY: ICD-10-CM

## 2024-11-14 DIAGNOSIS — M54.51 VERTEBROGENIC LOW BACK PAIN: Primary | ICD-10-CM

## 2024-11-14 DIAGNOSIS — M47.897 OTHER SPONDYLOSIS, LUMBOSACRAL REGION: ICD-10-CM

## 2024-11-14 PROCEDURE — 3008F BODY MASS INDEX DOCD: CPT | Mod: CPTII,S$GLB,, | Performed by: ANESTHESIOLOGY

## 2024-11-14 PROCEDURE — 99214 OFFICE O/P EST MOD 30 MIN: CPT | Mod: S$GLB,,, | Performed by: ANESTHESIOLOGY

## 2024-11-14 PROCEDURE — 3079F DIAST BP 80-89 MM HG: CPT | Mod: CPTII,S$GLB,, | Performed by: ANESTHESIOLOGY

## 2024-11-14 PROCEDURE — 3075F SYST BP GE 130 - 139MM HG: CPT | Mod: CPTII,S$GLB,, | Performed by: ANESTHESIOLOGY

## 2024-11-14 PROCEDURE — 99999 PR PBB SHADOW E&M-EST. PATIENT-LVL III: CPT | Mod: PBBFAC,,, | Performed by: ANESTHESIOLOGY

## 2024-11-14 NOTE — PROGRESS NOTES
This note was completed with dictation software and grammatical errors may exist.    Chief Complaint   Patient presents with    Follow-up     PAIN - follow up          HPI: Jorge Bae III is a 44 y.o. year old male patient who has a past medical history of Depression, Hematuria, and Testosterone deficiency. He presents in referral from No ref. provider found for back pain.  The patient presents for low back pain, he has seen several of my colleagues but this is the 1st time I have seen him.  He reports having back pain for many years, exercises 7 days a week in keeps his core strength intact.  His pain has primarily been in the low back and other than some infrequent radicular leg pain over the years, the pain has primarily only been in the low back and upper gluteal muscles somewhat in the buttocks.  His pain is significantly worse with bending forward, has problems putting on his shoes.  It can be worse with sitting for long period of time.  He does okay with standing and walking but standing and bending forward slightly worsens the pain significantly.  Again, he denies any significant leg symptoms, no numbness or tingling in the legs, no weakness.      Pain intervention history:  - s/p bilateral L4/5 and L5/S1 facet joint injections on 11/3/21 on the [facility]  - s/p L5-S1 on 07/22/2022 and reports 85-90% relief   - s/p bilateral L5-S1 TFESI on 08/16/2022 with over 90% relief  - s/p bilateral L5-S1 TFESI on 7/23/2023  - diagnostic bilateral L4/5 and L5/S1 medial branch blocks on 6/12/24 with no relief  - s/p caudal JONNATHAN on 08/05/2024 with 0% relief.      Spine surgeries: None    Antineuropathics:  NSAIDs:  Celebrex  Physical therapy: Has completed PT and continues HEP  Antidepressants:  Bupropion  Muscle relaxers:  Tizanidine, Robaxin, baclofen  Opioids:  Antiplatelets/Anticoagulants:        ROS:    Lab Results   Component Value Date    HGBA1C 4.7 02/03/2020       Lab Results   Component Value Date     WBC 7.41 08/29/2024    HGB 17.3 08/29/2024    HCT 49.2 08/29/2024    MCV 90 08/29/2024     08/29/2024             Past Medical History:   Diagnosis Date    Depression     Hematuria     Testosterone deficiency        Past Surgical History:   Procedure Laterality Date    CAUDAL EPIDURAL STEROID INJECTION N/A 8/5/2024    Procedure: Injection-steroid-epidural-caudal;  Surgeon: Reno Yu MD;  Location: [facility];  Service: Pain Management;  Laterality: N/A;    deviated septum repair      EPIDURAL STEROID INJECTION INTO LUMBAR SPINE N/A 7/22/2022    Procedure: Injection-steroid-epidural-lumbar L5/S1;  Surgeon: Reno Yu MD;  Location: [facility];  Service: Pain Management;  Laterality: N/A;    ESOPHAGOGASTRODUODENOSCOPY N/A 11/11/2020    Procedure: EGD (ESOPHAGOGASTRODUODENOSCOPY);  Surgeon: Yunior Stevens MD;  Location: [facility];  Service: Endoscopy;  Laterality: N/A;    ESOPHAGOGASTRODUODENOSCOPY N/A 12/4/2023    Procedure: EGD (ESOPHAGOGASTRODUODENOSCOPY);  Surgeon: Mike Chambers MD;  Location: [facility];  Service: Gastroenterology;  Laterality: N/A;    INJECTION OF ANESTHETIC AGENT AROUND NERVE Bilateral 6/12/2024    Procedure: BLOCK, NERVE BILATERAL L3, 4, 5 MEDIAL BRANCH;  Surgeon: Mateus Vargas MD;  Location: [facility];  Service: Pain Management;  Laterality: Bilateral;  [phone]    INJECTION OF FACET JOINT Bilateral 11/3/2021    Procedure: INJECTION, FACET JOINT BILATERAL L4/5, L5/S1;  Surgeon: Mateus Vargas MD;  Location: [facility];  Service: Pain Management;  Laterality: Bilateral;    TRANSFORAMINAL EPIDURAL INJECTION OF STEROID Bilateral 8/16/2022    Procedure: Injection,steroid,epidural,transforaminal approach L5/S1;  Surgeon: Reno Yu MD;  Location: [facility];  Service: Pain Management;  Laterality: Bilateral;    TRANSFORAMINAL EPIDURAL INJECTION OF STEROID Bilateral 7/7/2023    Procedure: Injection,steroid,epidural,transforaminal approach;  Surgeon: Reno  "YUN Yu MD;  Location: [facility] OR;  Service: Pain Management;  Laterality: Bilateral;  L5/S1       Social History     Socioeconomic History    Marital status:    Tobacco Use    Smoking status: Never    Smokeless tobacco: Never   Substance and Sexual Activity    Alcohol use: Not Currently     Alcohol/week: 10.0 standard drinks of alcohol     Types: 10 Glasses of wine per week     Comment: stopped in 2021, 4-6 times per week, 1/2 wine bottle per night    Drug use: Not Currently     Types: Amphetamines    Sexual activity: Yes     Partners: Female     Birth control/protection: None     Comment: partner has Mirena   Social History Narrative    January 4    Zia 16 mos    Works as .     Social Drivers of Health     Financial Resource Strain: Low Risk  (4/23/2024)    Overall Financial Resource Strain (CARDIA)     Difficulty of Paying Living Expenses: Not hard at all   Food Insecurity: No Food Insecurity (4/23/2024)    Hunger Vital Sign     Worried About Running Out of Food in the Last Year: Never true     Ran Out of Food in the Last Year: Never true   Transportation Needs: Unmet Transportation Needs (4/23/2024)    PRAPARE - Transportation     Lack of Transportation (Medical): Yes     Lack of Transportation (Non-Medical): No   Physical Activity: Sufficiently Active (4/23/2024)    Exercise Vital Sign     Days of Exercise per Week: 7 days     Minutes of Exercise per Session: 70 min   Stress: No Stress Concern Present (4/23/2024)    [place] [employer] [facility] - Occupational Stress Questionnaire     Feeling of Stress : Only a little   Housing Stability: Low Risk  (2/4/2022)    Housing Stability Vital Sign     Unable to Pay for Housing in the Last Year: No     Number of Places Lived in the Last Year: 1     Unstable Housing in the Last Year: No         Medications/Allergies: See med card    Vitals:    11/14/24 0840   BP: 138/88   Pulse: 69   Weight: 80.7 kg (177 lb 14.6 oz)   Height: 5' 9" (1.753 " m)   PainSc:   6   PainLoc: Back     Body mass index is 26.27 kg/m².      11/14/2024     8:39 AM 8/29/2024     3:49 PM 7/11/2024    12:57 PM   Last 3 PDI Scores   Pain Disability Index (PDI) 29 41 36       Physical exam:  Gen: A and O x3, pleasant, well-groomed  Musculoskeletal: No antalgic gait.     Neuro:    Iliopsoas Quadriceps Knee  Flexion Tibialis  anterior Gastro- cnemius EHL   Lower: R 5/5 5/5 5/5 5/5 5/5 5/5    L 5/5 5/5 5/5 5/5 5/5 5/5      Left  Right    Patellar DTR 2+ 2+   Achilles DTR 2+ 2+                    Intact and symmetrical to light touch and pinprick in L1-S1 dermatomes bilaterally.  Lumbar spine:  Lumbar spine: ROM moderately reduced with forward flexion with severe increased pain, mildly reduced with extension with slight increased pain.   Isra's test causes no increased pain on either side.    Supine straight leg raise is negative bilaterally.    Internal and external rotation of the hip causes no increased pain on either side.  Myofascial exam:  Mild tenderness to palpation across lumbar paraspinous and upper gluteal muscles.    Imaging:  MRI L-spine 5/1/24:  BONES: Vertebral body heights are maintained.  Prominent type 1 endplate changes at L5-S1.  No aggressive bone marrow signal.   PARASPINAL AREA: Normal.   LUMBAR DISC LEVELS:   T12-L1: No disc herniation or significant posterior osteophytic ridging.  No significant spinal canal or foraminal stenosis.   L1-L2: No disc herniation or significant posterior osteophytic ridging.  No significant spinal canal or foraminal stenosis.   L2-L3: No disc herniation or significant posterior osteophytic ridging.  Minimal right greater than left facet hypertrophy.  No significant spinal canal or foraminal stenosis.   L3-L4: Minimal disc bulge..  Mild right and minimal left facet hypertrophy.  Ligamentum flavum thickening.  Minimal narrowing of the bilateral lateral recesses.  No significant spinal canal or foraminal stenosis.   L4-L5: Mild disc  bulge with tiny central protrusion with high-intensity zone in the protrusion.  Slight disc height loss with loss of normal T2 signal in the disc.  Minimal bilateral facet hypertrophy.  Mild-moderate narrowing of the bilateral lateral recesses, unchanged.  No significant spinal canal stenosis.  Mild bilateral foraminal stenosis, unchanged.   L5-S1: Mild disc bulge with superimposed central extrusion migrating cranially to the infra pedicle level, similar in size compared to prior study.  Disc height loss and loss of normal T2 signal in the disc.  Mild-moderate narrowing of the bilateral lateral recesses.  No significant spinal canal stenosis.  Mild bilateral foraminal stenosis, unchanged.           Assessment:  Jorge Bae III is a 44 y.o. year old male patient who has a past medical history of Depression, Hematuria, and Testosterone deficiency. He presents in referral from No ref. provider found for back pain.      1. Vertebrogenic low back pain        2. Other spondylosis, lumbosacral region        3. Lumbar radiculopathy        4. Degeneration of intervertebral disc of lumbar region with discogenic back pain            Plan:  1.  We discussed his symptoms, reviewed his exam and MRI and it is most significant for a central disc extrusion with cranial migration.  Nonetheless, he only has mild foraminal stenosis and no central canal stenosis hence the absence of any radicular leg pain.  He has already tried medial branch blocks with no relief which rules out facet joint pain.  What seems to stand out most is his Modic endplate changes within inferior endplate of L5 and superior endplate of S1 which is likely causing vertebrogenic pain.  We discussed basivertebral nerve ablation of L5 and S1 and he would like to proceed.  We discussed the risk of causing temporary radicular symptoms.  We also discussed that if this does not help, the next step would probably need to be either living with the symptoms or  proceeding with an L5/S1 fusion.  2. I will get documentation from his primary care physician that he has no psychiatric conditions that would contribute to his pain or substance abuse issues and we will submit for approval for the procedure.      Thank you for referring this interesting patient, and I look forward to continuing to collaborate in his care.

## 2024-11-19 ENCOUNTER — TELEPHONE (OUTPATIENT)
Dept: PAIN MEDICINE | Facility: CLINIC | Age: 44
End: 2024-11-19
Payer: COMMERCIAL

## 2024-11-21 ENCOUNTER — TELEPHONE (OUTPATIENT)
Dept: PRIMARY CARE CLINIC | Facility: CLINIC | Age: 44
End: 2024-11-21
Payer: COMMERCIAL

## 2024-11-21 NOTE — TELEPHONE ENCOUNTER
I recommend doing a steroid dose pack, his wife mentioned that she had that for him. Did the muscle relaxants he's had ever helped? We can also bring him in for a toradol shot if he would like.

## 2024-12-05 ENCOUNTER — OFFICE VISIT (OUTPATIENT)
Dept: PRIMARY CARE CLINIC | Facility: CLINIC | Age: 44
End: 2024-12-05
Payer: COMMERCIAL

## 2024-12-05 ENCOUNTER — LAB VISIT (OUTPATIENT)
Dept: LAB | Facility: HOSPITAL | Age: 44
End: 2024-12-05
Attending: NURSE PRACTITIONER
Payer: COMMERCIAL

## 2024-12-05 VITALS
BODY MASS INDEX: 25.96 KG/M2 | SYSTOLIC BLOOD PRESSURE: 138 MMHG | DIASTOLIC BLOOD PRESSURE: 88 MMHG | HEART RATE: 69 BPM | WEIGHT: 175.25 LBS | HEIGHT: 69 IN | OXYGEN SATURATION: 99 %

## 2024-12-05 DIAGNOSIS — F33.0 MILD RECURRENT MAJOR DEPRESSION: ICD-10-CM

## 2024-12-05 DIAGNOSIS — Z00.00 ANNUAL PHYSICAL EXAM: ICD-10-CM

## 2024-12-05 DIAGNOSIS — Z13.6 ENCOUNTER FOR SCREENING FOR CARDIOVASCULAR DISORDERS: ICD-10-CM

## 2024-12-05 DIAGNOSIS — E34.9 TESTOSTERONE DEFICIENCY: ICD-10-CM

## 2024-12-05 DIAGNOSIS — Z00.00 ANNUAL PHYSICAL EXAM: Primary | ICD-10-CM

## 2024-12-05 LAB
BASOPHILS # BLD AUTO: 0.04 K/UL (ref 0–0.2)
BASOPHILS NFR BLD: 0.8 % (ref 0–1.9)
CHOLEST SERPL-MCNC: 189 MG/DL (ref 120–199)
CHOLEST/HDLC SERPL: 5.3 {RATIO} (ref 2–5)
DIFFERENTIAL METHOD BLD: NORMAL
EOSINOPHIL # BLD AUTO: 0.1 K/UL (ref 0–0.5)
EOSINOPHIL NFR BLD: 2.1 % (ref 0–8)
ERYTHROCYTE [DISTWIDTH] IN BLOOD BY AUTOMATED COUNT: 12.4 % (ref 11.5–14.5)
ESTIMATED AVG GLUCOSE: 100 MG/DL (ref 68–131)
HBA1C MFR BLD: 5.1 % (ref 4–5.6)
HCT VFR BLD AUTO: 51.2 % (ref 40–54)
HDLC SERPL-MCNC: 36 MG/DL (ref 40–75)
HDLC SERPL: 19 % (ref 20–50)
HGB BLD-MCNC: 17.3 G/DL (ref 14–18)
IMM GRANULOCYTES # BLD AUTO: 0.02 K/UL (ref 0–0.04)
IMM GRANULOCYTES NFR BLD AUTO: 0.4 % (ref 0–0.5)
LDLC SERPL CALC-MCNC: 140.6 MG/DL (ref 63–159)
LYMPHOCYTES # BLD AUTO: 1.6 K/UL (ref 1–4.8)
LYMPHOCYTES NFR BLD: 30.7 % (ref 18–48)
MCH RBC QN AUTO: 30.7 PG (ref 27–31)
MCHC RBC AUTO-ENTMCNC: 33.8 G/DL (ref 32–36)
MCV RBC AUTO: 91 FL (ref 82–98)
MONOCYTES # BLD AUTO: 0.6 K/UL (ref 0.3–1)
MONOCYTES NFR BLD: 11.5 % (ref 4–15)
NEUTROPHILS # BLD AUTO: 2.8 K/UL (ref 1.8–7.7)
NEUTROPHILS NFR BLD: 54.5 % (ref 38–73)
NONHDLC SERPL-MCNC: 153 MG/DL
NRBC BLD-RTO: 0 /100 WBC
PLATELET # BLD AUTO: 269 K/UL (ref 150–450)
PMV BLD AUTO: 9.9 FL (ref 9.2–12.9)
RBC # BLD AUTO: 5.64 M/UL (ref 4.6–6.2)
TRIGL SERPL-MCNC: 62 MG/DL (ref 30–150)
TSH SERPL DL<=0.005 MIU/L-ACNC: 2.25 UIU/ML (ref 0.4–4)
WBC # BLD AUTO: 5.15 K/UL (ref 3.9–12.7)

## 2024-12-05 PROCEDURE — 84443 ASSAY THYROID STIM HORMONE: CPT | Performed by: NURSE PRACTITIONER

## 2024-12-05 PROCEDURE — 1159F MED LIST DOCD IN RCRD: CPT | Mod: CPTII,S$GLB,, | Performed by: NURSE PRACTITIONER

## 2024-12-05 PROCEDURE — 99396 PREV VISIT EST AGE 40-64: CPT | Mod: S$GLB,,, | Performed by: NURSE PRACTITIONER

## 2024-12-05 PROCEDURE — 80061 LIPID PANEL: CPT | Performed by: NURSE PRACTITIONER

## 2024-12-05 PROCEDURE — 3008F BODY MASS INDEX DOCD: CPT | Mod: CPTII,S$GLB,, | Performed by: NURSE PRACTITIONER

## 2024-12-05 PROCEDURE — 3075F SYST BP GE 130 - 139MM HG: CPT | Mod: CPTII,S$GLB,, | Performed by: NURSE PRACTITIONER

## 2024-12-05 PROCEDURE — 85025 COMPLETE CBC W/AUTO DIFF WBC: CPT | Performed by: NURSE PRACTITIONER

## 2024-12-05 PROCEDURE — 3079F DIAST BP 80-89 MM HG: CPT | Mod: CPTII,S$GLB,, | Performed by: NURSE PRACTITIONER

## 2024-12-05 PROCEDURE — 83036 HEMOGLOBIN GLYCOSYLATED A1C: CPT | Performed by: NURSE PRACTITIONER

## 2024-12-05 PROCEDURE — 99999 PR PBB SHADOW E&M-EST. PATIENT-LVL IV: CPT | Mod: PBBFAC,,, | Performed by: NURSE PRACTITIONER

## 2024-12-05 NOTE — PROGRESS NOTES
[facility] Note    Chief Complaint      Chief Complaint   Patient presents with    Annual Exam       History of Present Illness      History of Present Illness    CHIEF COMPLAINT:  Jorge presents today for follow-up and to discuss blood pressure and cholesterol concerns.    CARDIOVASCULAR:  He reports blood pressure is higher than usual, noting he typically has low blood pressure. Current blood pressure readings are elevated. He denies checking blood pressure at home. He has a history of severe palpitations following COVID-19 infection and vaccination. A comprehensive cardiac workup, including an echocardiogram, revealed mild left atrial enlargement. EF was normal at 65%. No pericardial effusion, stenosis, or other cardiac abnormalities were noted. He denies any current cardiac symptoms.    CHOLESTEROL:  He reports increasing cholesterol levels. His recent LDL cholesterol level was 138 mg/dL. He discloses a family history of high cholesterol, noting that his father, who is 80 years old, is currently on statin medication.    DIET AND EXERCISE:  He reports being very active, working out every day. He follows a protein and keto-focused diet, which is high in fat and natural foods, with limited white carbohydrates. He adheres to this diet consistently but allows for occasional cheat days. He denies alcohol consumption.    MUSCULOSKELETAL:  He reports longstanding back pain that has not significantly worsened but continues to bother him. He is currently awaiting approval for back surgery.    MEDICATIONS:  Current medications include Zofran (PRN), Testosterone, and Xanaflex (PRN). He is on testosterone therapy, but the exact dosage is unclear.     FAMILY HISTORY:  His mother  of early onset Alzheimer's disease.    LABS:   Reviewed CMP done at outside lab.     ROS:  Cardiovascular: -chest pain  Genitourinary: +frequency, +nocturia  Musculoskeletal: +back pain         The 10-year ASCVD risk score  (Sharron HUYNH, et al., 2019) is: 1.7%    Values used to calculate the score:      Age: 44 years      Sex: Male      Is Non- : No      Diabetic: No      Tobacco smoker: No      Systolic Blood Pressure: 138 mmHg      Is BP treated: No      HDL Cholesterol: 52 mg/dL      Total Cholesterol: 212 mg/dL\      Testosterone 826   Most recent therapeutic draw 6 months ago  Testosterone 0.8 mL per week.       Past Medical History:  Past Medical History:   Diagnosis Date    Depression     Hematuria     Testosterone deficiency        Past Surgical History:   has a past surgical history that includes deviated septum repair; Esophagogastroduodenoscopy (N/A, 11/11/2020); Injection of facet joint (Bilateral, 11/3/2021); Epidural steroid injection into lumbar spine (N/A, 7/22/2022); Transforaminal epidural injection of steroid (Bilateral, 8/16/2022); Transforaminal epidural injection of steroid (Bilateral, 7/7/2023); Esophagogastroduodenoscopy (N/A, 12/4/2023); Injection of anesthetic agent around nerve (Bilateral, 6/12/2024); and Caudal epidural steroid injection (N/A, 8/5/2024).    Family History:  family history includes Alzheimer's disease in his mother; Early death in his mother; Heart disease in his maternal grandfather; No Known Problems in his father.     Social History:  Social History     Tobacco Use    Smoking status: Never    Smokeless tobacco: Never   Substance Use Topics    Alcohol use: Not Currently     Alcohol/week: 10.0 standard drinks of alcohol     Types: 10 Glasses of wine per week     Comment: stopped in 2021, 4-6 times per week, 1/2 wine bottle per night    Drug use: Not Currently     Types: Amphetamines         Medications:  Outpatient Encounter Medications as of 12/5/2024   Medication Sig Dispense Refill    atomoxetine (STRATTERA) 25 MG capsule Take 25 mg by mouth 2 (two) times daily.      azelastine (ASTELIN) 137 mcg (0.1 %) nasal spray 1 spray (137 mcg total) by Nasal route 2 (two) times  daily. 30 mL 11    baclofen (LIORESAL) 20 MG tablet Take 20 mg by mouth 3 (three) times daily.      buPROPion (WELLBUTRIN XL) 300 MG 24 hr tablet Take 300 mg by mouth once daily.      celecoxib (CELEBREX) 200 MG capsule Take 1 capsule (200 mg total) by mouth daily as needed for Pain. 40 capsule 1    esomeprazole (NEXIUM) 40 MG capsule TAKE 1 CAPSULE BY MOUTH EVERY DAY 90 capsule 1    fluticasone propionate (FLONASE) 50 mcg/actuation nasal spray 2 sprays (100 mcg total) by Each Nostril route 2 (two) times a day. 16 g 11    methocarbamoL (ROBAXIN) 750 MG Tab Take 1 tablet (750 mg total) by mouth 4 (four) times daily as needed. 44 tablet 3    ondansetron (ZOFRAN-ODT) 4 MG TbDL Take 4 mg by mouth every 6 (six) hours as needed.      testosterone cypionate (DEPOTESTOTERONE CYPIONATE) 200 mg/mL injection Inject 150 mg into the muscle every 7 days.      tiZANidine (ZANAFLEX) 4 MG tablet TAKE 1 TABLET BY MOUTH EVERY 8 HOURS. 270 tablet 1    [DISCONTINUED] adalimumab (HUMIRA,CF, PEN) 40 mg/0.4 mL PnKt Inject 0.4 mLs (40 mg total) into the skin every 14 (fourteen) days. (Patient not taking: Reported on 12/5/2024) 2 pen 11     No facility-administered encounter medications on file as of 12/5/2024.       Allergies:  Review of patient's allergies indicates:  No Known Allergies    Health Maintenance:  Immunization History   Administered Date(s) Administered    COVID-19, MRNA, LN-S, PF (MODERNA FULL 0.5 ML DOSE) 03/01/2021, 03/29/2021    Hepatitis A, Adult 06/07/2022    Influenza - Quadrivalent - MDCK - PF 10/27/2020    Influenza - Quadrivalent - PF *Preferred* (6 months and older) 09/22/2015, 09/27/2016, 10/08/2017, 08/29/2018, 09/27/2019, 10/16/2023    Tdap 08/29/2018, 09/10/2021      Health Maintenance   Topic Date Due    Hemoglobin A1c (Diabetic Prevention Screening)  02/03/2023    COVID-19 Vaccine (3 - 2024-25 season) 09/01/2024    Lipid Panel  04/04/2029    TETANUS VACCINE  09/10/2031    RSV Vaccine (Age 60+ and Pregnant  "patients) (1 - 1-dose 75+ series) 09/22/2057    Hepatitis C Screening  Completed    Influenza Vaccine  Completed    HIV Screening  Completed    Pneumococcal Vaccines (Age 0-64)  Aged Out        Physical Exam      Vital Signs  Pulse: 69  SpO2: 99 %  BP: 138/88  Pain Score:   7  Pain Loc: Back  Height and Weight  Height: 5' 9" (175.3 cm)  Weight: 79.5 kg (175 lb 4.3 oz)  BSA (Calculated - sq m): 1.97 sq meters  BMI (Calculated): 25.9  Weight in (lb) to have BMI = 25: 168.9]    Physical Exam  Constitutional:       Appearance: He is well-developed.   HENT:      Head: Normocephalic and atraumatic.   Neck:      Thyroid: No thyromegaly.   Cardiovascular:      Rate and Rhythm: Normal rate and regular rhythm.      Heart sounds: No murmur heard.  Pulmonary:      Effort: Pulmonary effort is normal. No respiratory distress.      Breath sounds: Normal breath sounds.   Abdominal:      General: There is no distension.      Palpations: Abdomen is soft.      Tenderness: There is no abdominal tenderness.   Skin:     General: Skin is warm and dry.   Neurological:      Mental Status: He is alert and oriented to person, place, and time.   Psychiatric:         Behavior: Behavior normal.          Laboratory:  CBC:  Recent Labs   Lab 10/17/23  1249 03/29/24  1714 08/29/24  1628   WBC 6.97 11.50 7.41   RBC 5.16 5.17 5.50   Hemoglobin 16.3 16.0 17.3   Hematocrit 48.8 48.3 49.2   Platelets 240 255 283   MCV 95 93 90   MCH 31.6 H 30.9 31.5 H   MCHC 33.4 33.1 35.2     CMP:  Recent Labs   Lab 06/29/22  0846 10/17/23  1249 03/29/24  1714   Glucose  --  92 119 H   Calcium  --  10.4 9.3   Albumin 4.3 4.5 4.6   Total Protein 7.3 7.8 7.6   Sodium  --  137 138   Potassium  --  5.1 4.1   CO2  --  24 26   Chloride  --  99 104   BUN  --  17 31 H   Alkaline Phosphatase 66 57 97   ALT 47 H 40 56 H   AST 43 H 40 65 H   Total Bilirubin 1.0 0.7 0.5     URINALYSIS:  Recent Labs   Lab 03/29/24  1741   Color, UA Yellow   Specific Gravity, UA 1.015   pH, UA 5.5 "   Protein, UA Negative   Nitrite, UA Negative   Leukocytes, UA Negative   Urobilinogen, UA 0.2      LIPIDS:  Recent Labs   Lab 02/14/22  1052 10/17/23  1249   TSH 2.278 1.613   HDL 49 52   Cholesterol 173 212 H   Triglycerides 36 106   LDL Cholesterol 116.8 138.8   HDL/Cholesterol Ratio 28.3 24.5   Non-HDL Cholesterol 124 160   Total Cholesterol/HDL Ratio 3.5 4.1     TSH:  Recent Labs   Lab 02/14/22  1052 10/17/23  1249   TSH 2.278 1.613     A1C:        Assessment/Plan     Jogre Bae III is a 44 y.o.male with:    Assessment & Plan    IMPRESSION:  - Assessed patient's cardiovascular risk; 10-year risk score for coronary event is 1.7% (low)  - Recommend CT calcium score to further evaluate cardiovascular risk  - Considered cholesterol management options, including potential statin therapy, pending CT calcium score results  - Evaluated blood pressure; elevated compared to patient's usual, but not critically elevated  - Reviewed recent lab results, noting slightly elevated BUN possibly due to hydration status  - Assessed liver enzymes, which have been persistently elevated; previous workup including liver scan was unremarkable  - Reviewed recent echocardiogram showing mild left atrial enlargement with normal ventricular size and function    ESSENTIAL HYPERTENSION:  - Educated on the importance of consistent blood pressure monitoring and target ranges.  - Jorge to monitor blood pressure at home on Monday, Wednesday, and Friday mornings; record readings and report back.  - Jorge to aim for blood pressure readings below 140/90 mmHg.  - Contact the office if blood pressure readings at home consistently exceed 140/90 mmHg.    HYPERLIPIDEMIA:  - Discussed CT calcium score as a tool for assessing cardiovascular risk and guiding treatment decisions.  - Recommend modifying current keto-focused diet based on pending lab results and CT calcium score.  - Fasting lipid panel ordered.  - CT calcium score ordered at  [facility].    NOCTURIA:  - Jorge to limit water intake 2 hours before bedtime to potentially reduce nocturia.    LOW BACK PAIN AND RADICULOPATHY:  - Continued as needed (PRN): Baclofen, Celebrex, Robaxin, Zanaflex.    GASTRO-ESOPHAGEAL REFLUX DISEASE:  - Continued as needed (PRN): Nexium.    TESTICULAR HYPOFUNCTION:  - Continued testosterone 0.8 mL weekly (0.4 mL twice weekly).    FAMILY HISTORY OF ALZHEIMER'S DISEASE:  - Explained the potential benefits of the MIND diet for cognitive health, particularly given family history of Alzheimer's disease.    GENERAL ADULT MEDICAL EXAMINATION:  - Thyroid function tests ordered.  - Hemoglobin A1c ordered.  - Complete blood count (CBC) ordered to check RBC count.    MEDICATION MANAGEMENT:  - Continued Wellbutrin daily.  - Continued Flonase most days.  - Continued as needed (PRN): Suhagra, Zofran.    FOLLOW-UP:  - Follow up after completing CT calcium score and lab work for result review and potential treatment adjustments.         1. Annual physical exam  - CBC W/ AUTO DIFFERENTIAL; Future  - LIPID PANEL; Future  - TSH; Future  - HEMOGLOBIN A1C; Future    2. Mild recurrent major depression  Stable. Continue current meds.      3. Encounter for screening for cardiovascular disorders  - CT Cardiac Scoring; Future  - CT Cardiac Scoring    4. Testosterone deficiency   Stable. Continue current meds.  Will monitor h/h    Chronic conditions status updated as per HPI.  Other than changes above, cont current medications and maintain follow up with specialists.  No follow-ups on file.    No future appointments.    This note was generated with the assistance of ambient listening technology. Verbal consent was obtained by the patient and accompanying visitor(s) for the recording of patient appointment to facilitate this note. I attest to having reviewed and edited the generated note for accuracy, though some syntax or spelling errors may persist.  Please contact the author of this note for any clarification.      MIGUEL Armenta  [facility]

## 2024-12-06 ENCOUNTER — TELEPHONE (OUTPATIENT)
Dept: PAIN MEDICINE | Facility: CLINIC | Age: 44
End: 2024-12-06
Payer: COMMERCIAL

## 2024-12-06 DIAGNOSIS — M54.51 VERTEBROGENIC LOW BACK PAIN: Primary | ICD-10-CM

## 2024-12-06 RX ORDER — CEFAZOLIN SODIUM 2 G/50ML
2 SOLUTION INTRAVENOUS
OUTPATIENT
Start: 2024-12-06

## 2024-12-06 RX ORDER — SODIUM CHLORIDE, SODIUM LACTATE, POTASSIUM CHLORIDE, CALCIUM CHLORIDE 600; 310; 30; 20 MG/100ML; MG/100ML; MG/100ML; MG/100ML
INJECTION, SOLUTION INTRAVENOUS CONTINUOUS
OUTPATIENT
Start: 2024-12-06

## 2024-12-06 NOTE — TELEPHONE ENCOUNTER
Physician - Dr Hayes    Type of Procedure/Injection - Basivertebral Nerve ablation L5 and S1           Laterality - NA      Priority - Normal      Anxiolysis- MAC      Need to hold medication - Yes      NSAIDs for 2 days    None      Clearance needed - No      Follow up - 7 day post op  Hope to get this case done by end of year

## 2024-12-06 NOTE — TELEPHONE ENCOUNTER
Spoke with patient and scheduled. Advised to hold NSAIDs x 2 days prior. Pre op information given and post op appointment scheduled.

## 2024-12-11 DIAGNOSIS — M62.838 MUSCLE SPASM: Primary | ICD-10-CM

## 2024-12-11 RX ORDER — TIZANIDINE 4 MG/1
TABLET ORAL
Qty: 90 TABLET | Refills: 1 | Status: SHIPPED | OUTPATIENT
Start: 2024-12-11

## 2024-12-13 ENCOUNTER — TELEPHONE (OUTPATIENT)
Dept: PAIN MEDICINE | Facility: CLINIC | Age: 44
End: 2024-12-13
Payer: COMMERCIAL

## 2024-12-13 NOTE — TELEPHONE ENCOUNTER
Spoke with josey to schedule p2p with Dr. Hayes on Tuesday 12/17 at 8:15am. Gave them his cellnumber. Auth request #[id number]

## 2024-12-18 ENCOUNTER — PATIENT MESSAGE (OUTPATIENT)
Dept: RHEUMATOLOGY | Facility: CLINIC | Age: 44
End: 2024-12-18
Payer: COMMERCIAL

## 2024-12-23 ENCOUNTER — TELEPHONE (OUTPATIENT)
Dept: PAIN MEDICINE | Facility: CLINIC | Age: 44
End: 2024-12-23
Payer: COMMERCIAL

## 2024-12-23 ENCOUNTER — HOSPITAL ENCOUNTER (OUTPATIENT)
Dept: RADIOLOGY | Facility: HOSPITAL | Age: 44
Discharge: HOME | End: 2024-12-23
Attending: ANESTHESIOLOGY | Admitting: ANESTHESIOLOGY
Payer: COMMERCIAL

## 2024-12-23 ENCOUNTER — HOSPITAL ENCOUNTER (OUTPATIENT)
Facility: HOSPITAL | Age: 44
Discharge: HOME | End: 2024-12-23
Attending: ANESTHESIOLOGY | Admitting: ANESTHESIOLOGY
Payer: COMMERCIAL

## 2024-12-23 ENCOUNTER — ANESTHESIA (OUTPATIENT)
Dept: SURGERY | Facility: HOSPITAL | Age: 44
End: 2024-12-23
Payer: COMMERCIAL

## 2024-12-23 ENCOUNTER — ANESTHESIA EVENT (OUTPATIENT)
Dept: SURGERY | Facility: HOSPITAL | Age: 44
End: 2024-12-23
Payer: COMMERCIAL

## 2024-12-23 DIAGNOSIS — M54.12 CERVICAL RADICULOPATHY: Primary | ICD-10-CM

## 2024-12-23 DIAGNOSIS — M54.12 CERVICAL RADICULOPATHY: ICD-10-CM

## 2024-12-23 DIAGNOSIS — M54.50 LOWER BACK PAIN: ICD-10-CM

## 2024-12-23 DIAGNOSIS — M54.51 VERTEBROGENIC LOW BACK PAIN: ICD-10-CM

## 2024-12-23 PROCEDURE — 36000705 HC OR TIME LEV I EA ADD 15 MIN: Mod: PO | Performed by: ANESTHESIOLOGY

## 2024-12-23 PROCEDURE — 27201423 OPTIME MED/SURG SUP & DEVICES STERILE SUPPLY: Mod: PO | Performed by: ANESTHESIOLOGY

## 2024-12-23 PROCEDURE — 71000015 HC POSTOP RECOV 1ST HR: Mod: PO | Performed by: ANESTHESIOLOGY

## 2024-12-23 PROCEDURE — 37000009 HC ANESTHESIA EA ADD 15 MINS: Mod: PO | Performed by: ANESTHESIOLOGY

## 2024-12-23 PROCEDURE — 71000033 HC RECOVERY, INTIAL HOUR: Mod: PO | Performed by: ANESTHESIOLOGY

## 2024-12-23 PROCEDURE — 36000704 HC OR TIME LEV I 1ST 15 MIN: Mod: PO | Performed by: ANESTHESIOLOGY

## 2024-12-23 PROCEDURE — 25000003 PHARM REV CODE 250: Mod: PO | Performed by: NURSE ANESTHETIST, CERTIFIED REGISTERED

## 2024-12-23 PROCEDURE — 64628 TRML DSTRJ IOS BVN 1ST 2 L/S: CPT | Mod: ,,, | Performed by: ANESTHESIOLOGY

## 2024-12-23 PROCEDURE — 63600175 PHARM REV CODE 636 W HCPCS: Mod: PO | Performed by: ANESTHESIOLOGY

## 2024-12-23 PROCEDURE — 72141 MRI NECK SPINE W/O DYE: CPT | Mod: TC,PO

## 2024-12-23 PROCEDURE — 37000008 HC ANESTHESIA 1ST 15 MINUTES: Mod: PO | Performed by: ANESTHESIOLOGY

## 2024-12-23 PROCEDURE — 72141 MRI NECK SPINE W/O DYE: CPT | Mod: 26,,, | Performed by: RADIOLOGY

## 2024-12-23 PROCEDURE — 63600175 PHARM REV CODE 636 W HCPCS: Mod: PO | Performed by: NURSE ANESTHETIST, CERTIFIED REGISTERED

## 2024-12-23 RX ORDER — PROPOFOL 10 MG/ML
VIAL (ML) INTRAVENOUS CONTINUOUS PRN
Status: DISCONTINUED | OUTPATIENT
Start: 2024-12-23 | End: 2024-12-23

## 2024-12-23 RX ORDER — FENTANYL CITRATE 50 UG/ML
INJECTION, SOLUTION INTRAMUSCULAR; INTRAVENOUS
Status: DISCONTINUED | OUTPATIENT
Start: 2024-12-23 | End: 2024-12-23

## 2024-12-23 RX ORDER — LIDOCAINE HYDROCHLORIDE 20 MG/ML
INJECTION INTRAVENOUS
Status: DISCONTINUED | OUTPATIENT
Start: 2024-12-23 | End: 2024-12-23

## 2024-12-23 RX ORDER — ONDANSETRON HYDROCHLORIDE 2 MG/ML
INJECTION, SOLUTION INTRAVENOUS
Status: DISCONTINUED | OUTPATIENT
Start: 2024-12-23 | End: 2024-12-23

## 2024-12-23 RX ORDER — ACETAMINOPHEN 10 MG/ML
INJECTION, SOLUTION INTRAVENOUS
Status: DISCONTINUED | OUTPATIENT
Start: 2024-12-23 | End: 2024-12-23

## 2024-12-23 RX ORDER — DEXAMETHASONE SODIUM PHOSPHATE 4 MG/ML
INJECTION, SOLUTION INTRA-ARTICULAR; INTRALESIONAL; INTRAMUSCULAR; INTRAVENOUS; SOFT TISSUE
Status: DISCONTINUED | OUTPATIENT
Start: 2024-12-23 | End: 2024-12-23

## 2024-12-23 RX ORDER — SODIUM CHLORIDE, SODIUM LACTATE, POTASSIUM CHLORIDE, CALCIUM CHLORIDE 600; 310; 30; 20 MG/100ML; MG/100ML; MG/100ML; MG/100ML
INJECTION, SOLUTION INTRAVENOUS CONTINUOUS
Status: DISCONTINUED | OUTPATIENT
Start: 2024-12-23 | End: 2024-12-23 | Stop reason: HOSPADM

## 2024-12-23 RX ORDER — MIDAZOLAM HYDROCHLORIDE 1 MG/ML
INJECTION INTRAMUSCULAR; INTRAVENOUS
Status: DISCONTINUED | OUTPATIENT
Start: 2024-12-23 | End: 2024-12-23

## 2024-12-23 RX ORDER — PROPOFOL 10 MG/ML
VIAL (ML) INTRAVENOUS
Status: DISCONTINUED | OUTPATIENT
Start: 2024-12-23 | End: 2024-12-23

## 2024-12-23 RX ORDER — GABAPENTIN 300 MG/1
300 CAPSULE ORAL 2 TIMES DAILY
Qty: 60 CAPSULE | Refills: 2 | Status: SHIPPED | OUTPATIENT
Start: 2024-12-23 | End: 2025-12-23

## 2024-12-23 RX ORDER — LIDOCAINE HYDROCHLORIDE 10 MG/ML
INJECTION, SOLUTION EPIDURAL; INFILTRATION; INTRACAUDAL; PERINEURAL
Status: DISCONTINUED | OUTPATIENT
Start: 2024-12-23 | End: 2024-12-23 | Stop reason: HOSPADM

## 2024-12-23 RX ORDER — HYDROCODONE BITARTRATE AND ACETAMINOPHEN 10; 325 MG/1; MG/1
1 TABLET ORAL EVERY 6 HOURS PRN
Qty: 20 TABLET | Refills: 0 | Status: SHIPPED | OUTPATIENT
Start: 2024-12-23 | End: 2025-01-22

## 2024-12-23 RX ORDER — KETOROLAC TROMETHAMINE 30 MG/ML
INJECTION, SOLUTION INTRAMUSCULAR; INTRAVENOUS
Status: DISCONTINUED | OUTPATIENT
Start: 2024-12-23 | End: 2024-12-23

## 2024-12-23 RX ORDER — KETAMINE HCL IN 0.9 % NACL 50 MG/5 ML
SYRINGE (ML) INTRAVENOUS
Status: DISCONTINUED | OUTPATIENT
Start: 2024-12-23 | End: 2024-12-23

## 2024-12-23 RX ORDER — CEFAZOLIN SODIUM 1 G/3ML
2 INJECTION, POWDER, FOR SOLUTION INTRAMUSCULAR; INTRAVENOUS
Status: COMPLETED | OUTPATIENT
Start: 2024-12-23 | End: 2024-12-23

## 2024-12-23 RX ADMIN — ACETAMINOPHEN 1000 MG: 10 INJECTION INTRAVENOUS at 01:12

## 2024-12-23 RX ADMIN — FENTANYL CITRATE 50 MCG: 50 INJECTION, SOLUTION INTRAMUSCULAR; INTRAVENOUS at 12:12

## 2024-12-23 RX ADMIN — ONDANSETRON 4 MG: 2 INJECTION, SOLUTION INTRAMUSCULAR; INTRAVENOUS at 01:12

## 2024-12-23 RX ADMIN — KETOROLAC TROMETHAMINE 30 MG: 30 INJECTION, SOLUTION INTRAMUSCULAR at 02:12

## 2024-12-23 RX ADMIN — PROPOFOL 30 MG: 10 INJECTION, EMULSION INTRAVENOUS at 12:12

## 2024-12-23 RX ADMIN — PROPOFOL 100 MCG/KG/MIN: 10 INJECTION, EMULSION INTRAVENOUS at 12:12

## 2024-12-23 RX ADMIN — Medication 30 MG: at 12:12

## 2024-12-23 RX ADMIN — CEFAZOLIN 2 G: 330 INJECTION, POWDER, FOR SOLUTION INTRAMUSCULAR; INTRAVENOUS at 12:12

## 2024-12-23 RX ADMIN — DEXAMETHASONE SODIUM PHOSPHATE 8 MG: 4 INJECTION, SOLUTION INTRAMUSCULAR; INTRAVENOUS at 12:12

## 2024-12-23 RX ADMIN — LIDOCAINE HYDROCHLORIDE 100 MG: 20 INJECTION INTRAVENOUS at 12:12

## 2024-12-23 RX ADMIN — DEXAMETHASONE SODIUM PHOSPHATE 4 MG: 4 INJECTION, SOLUTION INTRAMUSCULAR; INTRAVENOUS at 01:12

## 2024-12-23 RX ADMIN — MIDAZOLAM HYDROCHLORIDE 2 MG: 2 INJECTION, SOLUTION INTRAMUSCULAR; INTRAVENOUS at 12:12

## 2024-12-23 NOTE — TRANSFER OF CARE
"Anesthesia Transfer of Care Note    Patient: Jorge Bae III    Procedure(s) Performed: Procedure(s) (LRB):  DESTRUCTION,INTRAOSSEOUS BASIVERTEBRAL NERVE,1ST TWO BODIES,LUM/SAC    L5 and S1 (N/A)    Patient location: PACU    Anesthesia Type: MAC    Transport from OR: Transported from OR on room air with adequate spontaneous ventilation    Post pain: adequate analgesia    Post assessment: no apparent anesthetic complications and tolerated procedure well    Post vital signs: stable    Level of consciousness: awake, alert and oriented    Nausea/Vomiting: no nausea/vomiting    Complications: none    Transfer of care protocol was followed      Last vitals: Visit Vitals  /79   Pulse 86   Temp 36.2 °C (97.1 °F) (Skin)   Resp 18   Ht 5' 9" (1.753 m)   Wt 79.4 kg (175 lb)   SpO2 99%   BMI 25.84 kg/m²     "

## 2024-12-23 NOTE — DISCHARGE SUMMARY
[facility] - Surgery  Discharge Note  Short Stay    Procedure(s) (LRB):  DESTRUCTION,INTRAOSSEOUS BASIVERTEBRAL NERVE,1ST TWO BODIES,LUM/SAC    L5 and S1 (N/A)      OUTCOME: Patient tolerated treatment/procedure well without complication and is now ready for discharge.    DISPOSITION: Home or Self Care    FINAL DIAGNOSIS:  Vertebrogenic low back pain    FOLLOWUP: In clinic    DISCHARGE INSTRUCTIONS:    Discharge Procedure Orders   Diet Adult Regular     No dressing needed     Notify your health care provider if you experience any of the following:  temperature >100.4     Activity as tolerated

## 2024-12-23 NOTE — H&P
CC: Back pain    HPI: The patient is a 45yo man with a history of vertebrogenic back pain here for L5 and S1 basivertebral nerve ablation. There are no major changes in history and physical from 11/14/24.    Past Medical History:   Diagnosis Date    Depression     Hematuria     Testosterone deficiency        Past Surgical History:   Procedure Laterality Date    CAUDAL EPIDURAL STEROID INJECTION N/A 8/5/2024    Procedure: Injection-steroid-epidural-caudal;  Surgeon: Reno Yu MD;  Location: [facility] OR;  Service: Pain Management;  Laterality: N/A;    deviated septum repair      EPIDURAL STEROID INJECTION INTO LUMBAR SPINE N/A 7/22/2022    Procedure: Injection-steroid-epidural-lumbar L5/S1;  Surgeon: Reno Yu MD;  Location: [facility] OR;  Service: Pain Management;  Laterality: N/A;    ESOPHAGOGASTRODUODENOSCOPY N/A 11/11/2020    Procedure: EGD (ESOPHAGOGASTRODUODENOSCOPY);  Surgeon: Yunior Stevens MD;  [facility];  Service: Endoscopy;  Laterality: N/A;    ESOPHAGOGASTRODUODENOSCOPY N/A 12/4/2023    Procedure: EGD (ESOPHAGOGASTRODUODENOSCOPY);  Surgeon: Mike Chambers MD;  Location: [facility];  Service: Gastroenterology;  Laterality: N/A;    INJECTION OF ANESTHETIC AGENT AROUND NERVE Bilateral 6/12/2024    Procedure: BLOCK, NERVE BILATERAL L3, 4, 5 MEDIAL BRANCH;  Surgeon: Mateus Vargas MD;  [facility];  Service: Pain Management;  Laterality: Bilateral;  [phone]    INJECTION OF FACET JOINT Bilateral 11/3/2021    Procedure: INJECTION, FACET JOINT BILATERAL L4/5, L5/S1;  Surgeon: Mateus Vargas MD;  Location: [facility];  Service: Pain Management;  Laterality: Bilateral;    TRANSFORAMINAL EPIDURAL INJECTION OF STEROID Bilateral 8/16/2022    Procedure: Injection,steroid,epidural,transforaminal approach L5/S1;  Surgeon: Reno Yu MD;  Location: [facility] OR;  Service: Pain Management;  Laterality: Bilateral;    TRANSFORAMINAL EPIDURAL INJECTION OF STEROID Bilateral  7/7/2023    Procedure: Injection,steroid,epidural,transforaminal approach;  Surgeon: Reno Yu MD;  Location: [facility] OR;  Service: Pain Management;  Laterality: Bilateral;  L5/S1       Family History   Problem Relation Name Age of Onset    Alzheimer's disease Mother Mirela Bae     Early death Mother Mirela Bae     No Known Problems Father      Heart disease Maternal Grandfather Roge Do     Glaucoma Neg Hx      Macular degeneration Neg Hx      Retinal detachment Neg Hx         Social History     Socioeconomic History    Marital status:    Tobacco Use    Smoking status: Never    Smokeless tobacco: Never   Substance and Sexual Activity    Alcohol use: Not Currently     Alcohol/week: 10.0 standard drinks of alcohol     Types: 10 Glasses of wine per week     Comment: stopped in 2021, 4-6 times per week, 1/2 wine bottle per night    Drug use: Not Currently     Types: Amphetamines    Sexual activity: Yes     Partners: Female     Birth control/protection: None     Comment: partner has Mirena   Social History Narrative    January 4    Zia 16 mos    Works as .     Social Drivers of Health     Financial Resource Strain: Low Risk  (4/23/2024)    Overall Financial Resource Strain (CARDIA)     Difficulty of Paying Living Expenses: Not hard at all   Food Insecurity: No Food Insecurity (4/23/2024)    Hunger Vital Sign     Worried About Running Out of Food in the Last Year: Never true     Ran Out of Food in the Last Year: Never true   Transportation Needs: Unmet Transportation Needs (4/23/2024)    PRAPARE - Transportation     Lack of Transportation (Medical): Yes     Lack of Transportation (Non-Medical): No   Physical Activity: Sufficiently Active (4/23/2024)    Exercise Vital Sign     Days of Exercise per Week: 7 days     Minutes of Exercise per Session: 70 min   Stress: No Stress Concern Present (4/23/2024)    [employer] - Occupational Stress Questionnaire      "Feeling of Stress : Only a little   Housing Stability: Low Risk  (2/4/2022)    Housing Stability Vital Sign     Unable to Pay for Housing in the Last Year: No     Number of Places Lived in the Last Year: 1     Unstable Housing in the Last Year: No       Current Facility-Administered Medications   Medication Dose Route Frequency Provider Last Rate Last Admin    ceFAZolin injection 2 g  2 g Intravenous On Call Procedure Marcelino Hayes MD        lactated ringers infusion   Intravenous Continuous Marcelino Hayes MD           Review of patient's allergies indicates:  No Known Allergies    Vitals:    12/16/24 1115 12/23/24 1137 12/23/24 1148   BP:   138/79   Pulse:   86   Resp:   18   Temp:  97.1 °F (36.2 °C)    TempSrc:  Skin    SpO2:   99%   Weight: 79.4 kg (175 lb)     Height: 5' 9" (1.753 m)         ASA 2, Mallampati 2    REVIEW OF SYSTEMS:     GENERAL: No weight loss, malaise or fevers.  HEENT:  No recent changes in vision or hearing  NECK: Negative for lumps, no difficulty with swallowing.  RESPIRATORY: Negative for cough, wheezing or shortness of breath, patient denies any recent URI.  CARDIOVASCULAR: Negative for chest pain, leg swelling or palpitations.  GI: Negative for abdominal discomfort, blood in stools or black stools or change in bowel habits.  MUSCULOSKELETAL: See HPI.  SKIN: Negative for lesions, rash, and itching.  PSYCH: No suicidal or homicidal ideations, no current mood disturbances.  HEMATOLOGY/LYMPHOLOGY: Negative for prolonged bleeding, bruising easily or swollen nodes. Patient is not currently taking any anti-coagulants  ENDO: No history of diabetes or thyroid dysfunction  NEURO: No history of syncope, paralysis, seizures or tremors.All other reviewed and negative other than HPI.    Physical exam:  Gen: A and O x3, pleasant, well-groomed  Skin: No rashes or obvious lesions  HEENT: PERRLA, no obvious deformities on ears or in canals. No thyroid masses, trachea midline, no palpable lymph " nodes in neck, axilla.  CVS: Regular rate and rhythm, normal S1 and S2, no murmurs.  Resp: Clear to auscultation bilaterally.  Abdomen: Soft, NT/ND, normal bowel sounds present.  Musculoskeletal/Neuro: Moving all extremities    Assessment:  Vertebrogenic low back pain  -     Place in Outpatient; Standing  -     Vital signs; Standing  -     Place 18-22 gauage peripheral IV ; Standing  -     Verify informed consent; Standing  -     Notify physician ; Standing  -     Notify physician ; Standing  -     Notify physician (specify); Standing  -     Diet NPO; Standing  -     lactated ringers infusion  -     ceFAZolin injection 2 g    Other orders  -     IP VTE LOW RISK PATIENT; Standing

## 2024-12-23 NOTE — DISCHARGE INSTRUCTIONS

## 2024-12-23 NOTE — TELEPHONE ENCOUNTER
Patient has had chronic neck pain treated with NSAIDs, PT, massage. In the last several days has developed severe worsening of right neck through right arm radicular pain. Pain has been unrelenting, not able to sleep, not responding to steroids. Will order an MRI C-spine and begin gabapentin. Just completed lumbar procedure so will have pain medication for this.I will call him with C-spine MRI results.

## 2024-12-23 NOTE — ANESTHESIA PREPROCEDURE EVALUATION
12/23/2024  Jorge Bae III is a 44 y.o., male.      Pre-op Assessment    I have reviewed the Patient Summary Reports.     I have reviewed the Nursing Notes. I have reviewed the NPO Status.   I have reviewed the Medications.     Review of Systems  Anesthesia Hx:             Denies Family Hx of Anesthesia complications.    Denies Personal Hx of Anesthesia complications.                    Social:  Alcohol Use       Musculoskeletal:         Spine Disorders: lumbar and cervical            Psych:  Psychiatric History  depression                Physical Exam  General: Well nourished, Cooperative, Alert and Oriented    Airway:  Mallampati: III / II  Neck ROM: Extension Decreased        Anesthesia Plan  Type of Anesthesia, risks & benefits discussed:    Anesthesia Type: MAC  Intra-op Monitoring Plan: Standard ASA Monitors  Post Op Pain Control Plan: multimodal analgesia  Informed Consent: Informed consent signed with the Patient and all parties understand the risks and agree with anesthesia plan.  All questions answered.   ASA Score: 2    Ready For Surgery From Anesthesia Perspective.     .

## 2024-12-23 NOTE — OP NOTE
Patient Name: Jorge Bae III  MRN: [id number]    INFORMED CONSENT: The procedure, risks, benefits and options were discussed with patient. There are no contraindications to the procedure. The patient expressed understanding and agreed to proceed. The personnel performing the procedure was discussed. I verify that I personally obtained Jorge's consent prior to the start of the procedure and the signed consent can be found on the patient's chart.        Procedure Date: 12/23/2024  Surgeons and Role:     * Marcelino Hayes MD - Primary      Pre Procedure diagnosis: Vertebrogenic low back pain [M54.51]  1. Vertebrogenic low back pain      Post-Procedure diagnosis: SAME    Sedation: MAC per anesthesia      PROCEDURE: Intracept Procedure at L5 and S1  SURGEON:DR. Marcelino Hayes MD  OPERATION:               1. Intracept procedure at L5 and S1 levels              2. Radiofrequency ablation        PREOPERATIVE ANTIBIOTICS: 2g Ancef IV given 30 minutes prior to incision, see anesthesia record for time.    OPERATIVE PROCEDURE: The patient was brought to the operating room suite. The patient was positioned prone on the pain table. The back was prepped and draped. Fluoroscope was brought into position and the left L5 and right S1 pedicles were identified and marked with a skin marker. The C-arm, was turned oblique to visualize the pedicle and superior border of the right pedicle was marked. The site was anesthestized with Lidocaine 1%. A 1 cm paramedian incision was made. The osteointroducer with bevel was placed and advanced through the pedicle with AP and lateral fluoroscopy imaging guidance. Once the needle was at the junction of the pedicle and the vertebral body, a lateral image was taken to insure that the cannula was positioned just past the vertebral body wall. Through the cannula, a curved and straight stylet was advanced into the vertebral body under fluoroscopic guidance creating a channel. The radiopaque  marker bands on the stylet were identified using AP and lateral images.   After completing the entry into the vertebral body, a radiofrequency probe was inserted through the cannula and advanced under fluoroscopic guidance. The radiopaque marker bands on the probe were identified using AP and lateral images. Then radiofrequency ablation was performed for 7 minutes at L5 and 15 minutes for S1. Once the ablation was completed, the curved needle and cannula was then removed.           Post-procedure, all incisions were closed with dermabond. PATIENT was moving lower extremities at recovery and neurological exam was unchanged from pre-procedure.     COMPLICATIONS: There were no complications.          Treatment plan was discussed with the patient. Post procedure instructions were reviewed and the patient voiced understanding.    Blood Loss: <5ml  Specimen: None    Marcelino Hayes MD

## 2024-12-24 ENCOUNTER — TELEPHONE (OUTPATIENT)
Dept: PAIN MEDICINE | Facility: CLINIC | Age: 44
End: 2024-12-24
Payer: COMMERCIAL

## 2024-12-24 VITALS
TEMPERATURE: 98 F | WEIGHT: 175 LBS | OXYGEN SATURATION: 98 % | SYSTOLIC BLOOD PRESSURE: 135 MMHG | DIASTOLIC BLOOD PRESSURE: 74 MMHG | HEART RATE: 80 BPM | RESPIRATION RATE: 18 BRPM | HEIGHT: 69 IN | BODY MASS INDEX: 25.92 KG/M2

## 2024-12-24 DIAGNOSIS — M54.16 LUMBAR RADICULOPATHY: Primary | ICD-10-CM

## 2024-12-24 NOTE — TELEPHONE ENCOUNTER
Put in case and let Sherley know. Patient is fine with date and will hold NSAIDs. Let Romero know to reach out to preservice because everyone I'm looking at on teams won't be back until Friday

## 2024-12-24 NOTE — TELEPHONE ENCOUNTER
This is an urgent case, severe cervical radiculopathy, just got an MRI last night and trying to avoid needing surgery. Please add onto Friday      Physician - Dr Hayes    Type of Procedure/Injection - Cervical Epidural  C7/T1 to the right         Laterality - NA      Priority - Normal      Anxiolysis- RNIV      Need to hold medication - Yes      NSAIDs for 2 days    None      Clearance needed - No      Follow up - 2 week   Please add this case for Friday 12/27/24, this is medically urgent

## 2024-12-26 ENCOUNTER — TELEPHONE (OUTPATIENT)
Dept: PAIN MEDICINE | Facility: CLINIC | Age: 44
End: 2024-12-26
Payer: COMMERCIAL

## 2024-12-26 NOTE — TELEPHONE ENCOUNTER
----- Message from Sierra sent at 12/26/2024 12:57 PM CST -----  Type:  Needs Medical Advice    Who Called: josey  Would the patient rather a call back or a response via [facility]? call  Best Call Back Number:    Additional Information: injection approved for 12.26.2024-03.26.2025 [facility] [phone]

## 2024-12-27 ENCOUNTER — HOSPITAL ENCOUNTER (OUTPATIENT)
Facility: HOSPITAL | Age: 44
Discharge: HOME | End: 2024-12-27
Attending: ANESTHESIOLOGY | Admitting: ANESTHESIOLOGY
Payer: COMMERCIAL

## 2024-12-27 ENCOUNTER — HOSPITAL ENCOUNTER (OUTPATIENT)
Dept: RADIOLOGY | Facility: HOSPITAL | Age: 44
Discharge: HOME | End: 2024-12-27
Attending: ANESTHESIOLOGY | Admitting: ANESTHESIOLOGY
Payer: COMMERCIAL

## 2024-12-27 DIAGNOSIS — M54.16 LUMBAR RADICULOPATHY: ICD-10-CM

## 2024-12-27 DIAGNOSIS — M54.2 NECK PAIN: ICD-10-CM

## 2024-12-27 PROCEDURE — 25000003 PHARM REV CODE 250: Mod: PO | Performed by: ANESTHESIOLOGY

## 2024-12-27 PROCEDURE — A4216 STERILE WATER/SALINE, 10 ML: HCPCS | Mod: PO | Performed by: ANESTHESIOLOGY

## 2024-12-27 PROCEDURE — 62321 NJX INTERLAMINAR CRV/THRC: CPT | Mod: ,,, | Performed by: ANESTHESIOLOGY

## 2024-12-27 PROCEDURE — 63600175 PHARM REV CODE 636 W HCPCS: Mod: PO | Performed by: ANESTHESIOLOGY

## 2024-12-27 PROCEDURE — 62321 NJX INTERLAMINAR CRV/THRC: CPT | Mod: PO | Performed by: ANESTHESIOLOGY

## 2024-12-27 PROCEDURE — 25500020 PHARM REV CODE 255: Mod: PO | Performed by: ANESTHESIOLOGY

## 2024-12-27 RX ORDER — MIDAZOLAM HYDROCHLORIDE 1 MG/ML
INJECTION INTRAMUSCULAR; INTRAVENOUS
Status: DISCONTINUED | OUTPATIENT
Start: 2024-12-27 | End: 2024-12-27 | Stop reason: HOSPADM

## 2024-12-27 RX ORDER — METHYLPREDNISOLONE ACETATE 80 MG/ML
INJECTION, SUSPENSION INTRA-ARTICULAR; INTRALESIONAL; INTRAMUSCULAR; SOFT TISSUE
Status: DISCONTINUED | OUTPATIENT
Start: 2024-12-27 | End: 2024-12-27 | Stop reason: HOSPADM

## 2024-12-27 RX ORDER — OXYCODONE AND ACETAMINOPHEN 10; 325 MG/1; MG/1
1 TABLET ORAL ONCE
Status: COMPLETED | OUTPATIENT
Start: 2024-12-27 | End: 2024-12-27

## 2024-12-27 RX ORDER — SODIUM CHLORIDE 9 MG/ML
INJECTION, SOLUTION INTRAMUSCULAR; INTRAVENOUS; SUBCUTANEOUS
Status: DISCONTINUED | OUTPATIENT
Start: 2024-12-27 | End: 2024-12-27 | Stop reason: HOSPADM

## 2024-12-27 RX ORDER — FENTANYL CITRATE 50 UG/ML
INJECTION, SOLUTION INTRAMUSCULAR; INTRAVENOUS
Status: DISCONTINUED | OUTPATIENT
Start: 2024-12-27 | End: 2024-12-27 | Stop reason: HOSPADM

## 2024-12-27 RX ORDER — LIDOCAINE HYDROCHLORIDE 10 MG/ML
INJECTION, SOLUTION EPIDURAL; INFILTRATION; INTRACAUDAL; PERINEURAL
Status: DISCONTINUED | OUTPATIENT
Start: 2024-12-27 | End: 2024-12-27 | Stop reason: HOSPADM

## 2024-12-27 RX ADMIN — OXYCODONE AND ACETAMINOPHEN 1 TABLET: 10; 325 TABLET ORAL at 01:12

## 2024-12-27 NOTE — ANESTHESIA POSTPROCEDURE EVALUATION
Anesthesia Post Evaluation    Patient: Jorge Bae III    Procedure(s) Performed: Procedure(s) (LRB):  DESTRUCTION,INTRAOSSEOUS BASIVERTEBRAL NERVE,1ST TWO BODIES,LUM/SAC    L5 and S1 (N/A)    Final Anesthesia Type: MAC      Patient location during evaluation: PACU  Patient participation: Yes- Able to Participate  Level of consciousness: awake and alert  Post-procedure vital signs: reviewed and stable  Pain management: adequate  Airway patency: patent    PONV status at discharge: No PONV  Anesthetic complications: no      Cardiovascular status: blood pressure returned to baseline  Respiratory status: unassisted  Hydration status: euvolemic  Follow-up not needed.              Vitals Value Taken Time   /74 12/23/24 1435   Temp 36.4 °C (97.5 °F) 12/23/24 1410   Pulse 80 12/23/24 1435   Resp 18 12/23/24 1435   SpO2 98 % 12/23/24 1435         Event Time   Out of Recovery 14:38:41         Pain/Henry Score: No data recorded

## 2024-12-27 NOTE — OP NOTE
PROCEDURE DATE: 12/27/2024    Procedure: C7-T1 cervical interlaminar epidural steroid injection under utilizing fluoroscopy.    Diagnosis: Cervical Radiculopathy    POSTOP DIAGNOSIS: SAME    Physician: Marcelino Hayes MD    Medications injected:  Methylprednisone 80mg followed by a slow injection of 4 mL sterile, preservative-free normal saline.    Local anesthetic used: Lidocaine 1%, 4 ml.    Sedation Medications: 4mg versed, 50mcg fentanyl    Complications:  none    Estimated blood loss: none    Technique:  A time-out was taken to identify patient and procedure prior to starting the procedure.  With the patient laying in a prone position with the neck in a mid-flexed forward position, the area was prepped and draped in the usual sterile fashion using ChloraPrep and a fenestrated drape.  The area was determined under AP fluoroscopic guidance.  Local anesthetic was given using a 25-gauge 1.5 inch needle by raising a wheal and then infiltrating ventrally.  A 3.5 inch 20-gauge Touhy needle was introduced under fluoroscopic guidance to meet the lamina of C7.  The needle was then hinged under the lamina then advanced using loss of resistance technique.  Once the tip of the needle was in the desired position, the contrast dye Omnipaque was injected to determine placement and no uptake.  The steroid was then injected slowly followed by a slow injection of 4 mL of the sterile preservative-free normal saline.  The patient tolerated the procedure well.    The patient was monitored after the procedure and was given post-procedure and discharge instructions to follow at home. The patient was discharged in a stable condition.

## 2024-12-27 NOTE — DISCHARGE SUMMARY
[facility] - Surgery  Discharge Note  Short Stay    Procedure(s) (LRB):  Injection-steroid-epidural-cervical C7/T1 to the right (N/A)      OUTCOME: Patient tolerated treatment/procedure well without complication and is now ready for discharge.    DISPOSITION: Home or Self Care    FINAL DIAGNOSIS:  Lumbar radiculopathy    FOLLOWUP: In clinic    DISCHARGE INSTRUCTIONS:    Discharge Procedure Orders   Diet Adult Regular     No dressing needed     Notify your health care provider if you experience any of the following:  temperature >100.4     Activity as tolerated

## 2024-12-27 NOTE — H&P
CC: Neck pain    HPI: The patient is a 45yo man with a history of cervical radiculopathy here for cervical JONNATHAN. There are no major changes in history and physical from 11/14/24.    Past Medical History:   Diagnosis Date    Depression     Hematuria     Testosterone deficiency        Past Surgical History:   Procedure Laterality Date    CAUDAL EPIDURAL STEROID INJECTION N/A 8/5/2024    Procedure: Injection-steroid-epidural-caudal;  Surgeon: Reno Yu MD;  Location: [facility] OR;  Service: Pain Management;  Laterality: N/A;    DESTRUCTION, INTRAOSSEOUS BASIVERTEBRAL NERVE, 1ST TWO BODIES, LUM/SAC N/A 12/23/2024    Procedure: DESTRUCTION,INTRAOSSEOUS BASIVERTEBRAL NERVE,1ST TWO BODIES,LUM/SAC    L5 and S1;  Surgeon: Marcelino Hayes MD;  Location: [facility] OR;  Service: Pain Management;  Laterality: N/A;  normal    deviated septum repair      EPIDURAL STEROID INJECTION INTO LUMBAR SPINE N/A 7/22/2022    Procedure: Injection-steroid-epidural-lumbar L5/S1;  Surgeon: Reno Yu MD;  Location: [facility] OR;  Service: Pain Management;  Laterality: N/A;    ESOPHAGOGASTRODUODENOSCOPY N/A 11/11/2020    Procedure: EGD (ESOPHAGOGASTRODUODENOSCOPY);  Surgeon: Yunior Stevens MD;  Location: [facility];  Service: Endoscopy;  Laterality: N/A;    ESOPHAGOGASTRODUODENOSCOPY N/A 12/4/2023    Procedure: EGD (ESOPHAGOGASTRODUODENOSCOPY);  Surgeon: Mike Chambers MD;  Location: [facility];  Service: Gastroenterology;  Laterality: N/A;    INJECTION OF ANESTHETIC AGENT AROUND NERVE Bilateral 6/12/2024    Procedure: BLOCK, NERVE BILATERAL L3, 4, 5 MEDIAL BRANCH;  Surgeon: Mateus Vargas MD;  Location: [facility] [place], operated [facility];  Service: Pain Management;  Laterality: Bilateral;  [phone]    INJECTION OF FACET JOINT Bilateral 11/3/2021    Procedure: INJECTION, FACET JOINT BILATERAL L4/5, L5/S1;  Surgeon: Mateus Vargas MD;  Location: [facility], [place], operated [facility];  Service: Pain Management;  Laterality: Bilateral;    TRANSFORAMINAL EPIDURAL INJECTION  OF STEROID Bilateral 8/16/2022    Procedure: Injection,steroid,epidural,transforaminal approach L5/S1;  Surgeon: Reno Yu MD;  Location: [facility] OR;  Service: Pain Management;  Laterality: Bilateral;    TRANSFORAMINAL EPIDURAL INJECTION OF STEROID Bilateral 7/7/2023    Procedure: Injection,steroid,epidural,transforaminal approach;  Surgeon: Reno Yu MD;  Location: [facility] OR;  Service: Pain Management;  Laterality: Bilateral;  L5/S1       Family History   Problem Relation Name Age of Onset    Alzheimer's disease Mother Mirela Bae     Early death Mother Mirela Bae     No Known Problems Father      Heart disease Maternal Grandfather Roge Do     Glaucoma Neg Hx      Macular degeneration Neg Hx      Retinal detachment Neg Hx         Social History     Socioeconomic History    Marital status:    Tobacco Use    Smoking status: Never    Smokeless tobacco: Never   Substance and Sexual Activity    Alcohol use: Not Currently     Alcohol/week: 10.0 standard drinks of alcohol     Types: 10 Glasses of wine per week     Comment: stopped in 2021, 4-6 times per week, 1/2 wine bottle per night    Drug use: Not Currently     Types: Amphetamines    Sexual activity: Yes     Partners: Female     Birth control/protection: None     Comment: partner has Mirena   Social History Narrative    January 4    Zia 16 mos    Works as .     Social Drivers of Health     Financial Resource Strain: Low Risk  (4/23/2024)    Overall Financial Resource Strain (CARDIA)     Difficulty of Paying Living Expenses: Not hard at all   Food Insecurity: No Food Insecurity (4/23/2024)    Hunger Vital Sign     Worried About Running Out of Food in the Last Year: Never true     Ran Out of Food in the Last Year: Never true   Transportation Needs: Unmet Transportation Needs (4/23/2024)    PRAPARE - Transportation     Lack of Transportation (Medical): Yes     Lack of Transportation (Non-Medical): No   Physical Activity:  "Sufficiently Active (4/23/2024)    Exercise Vital Sign     Days of Exercise per Week: 7 days     Minutes of Exercise per Session: 70 min   Stress: No Stress Concern Present (4/23/2024)    [employer] - Occupational Stress Questionnaire     Feeling of Stress : Only a little   Housing Stability: Low Risk  (2/4/2022)    Housing Stability Vital Sign     Unable to Pay for Housing in the Last Year: No     Number of Places Lived in the Last Year: 1     Unstable Housing in the Last Year: No       No current facility-administered medications for this encounter.       Review of patient's allergies indicates:  No Known Allergies    Vitals:    12/26/24 0848 12/27/24 1222   BP:  (!) 154/89   Pulse:  86   Resp:  17   Temp:  98.2 °F (36.8 °C)   TempSrc:  Skin   SpO2:  98%   Weight: 79.4 kg (175 lb) 79.4 kg (175 lb)   Height: 5' 9" (1.753 m) 5' 9" (1.753 m)       ASA 2, Mallampati 2    REVIEW OF SYSTEMS:     GENERAL: No weight loss, malaise or fevers.  HEENT:  No recent changes in vision or hearing  NECK: Negative for lumps, no difficulty with swallowing.  RESPIRATORY: Negative for cough, wheezing or shortness of breath, patient denies any recent URI.  CARDIOVASCULAR: Negative for chest pain, leg swelling or palpitations.  GI: Negative for abdominal discomfort, blood in stools or black stools or change in bowel habits.  MUSCULOSKELETAL: See HPI.  SKIN: Negative for lesions, rash, and itching.  PSYCH: No suicidal or homicidal ideations, no current mood disturbances.  HEMATOLOGY/LYMPHOLOGY: Negative for prolonged bleeding, bruising easily or swollen nodes. Patient is not currently taking any anti-coagulants  ENDO: No history of diabetes or thyroid dysfunction  NEURO: No history of syncope, paralysis, seizures or tremors.All other reviewed and negative other than HPI.    Physical exam:  Gen: A and O x3, pleasant, well-groomed  Skin: No rashes or obvious lesions  HEENT: PERRLA, no obvious deformities on " ears or in canals. No thyroid masses, trachea midline, no palpable lymph nodes in neck, axilla.  CVS: Regular rate and rhythm, normal S1 and S2, no murmurs.  Resp: Clear to auscultation bilaterally.  Abdomen: Soft, NT/ND, normal bowel sounds present.  Musculoskeletal/Neuro: Moving all extremities    Assessment:  Lumbar radiculopathy  -     Place in Outpatient; Standing  -     Vital signs; Standing  -     Place 18-22 gaua peripheral IV ; Standing  -     Verify informed consent; Standing  -     Notify physician ; Standing  -     Notify physician ; Standing  -     Notify physician (specify); Standing  -     Diet NPO; Standing    Other orders  -     IP VTE LOW RISK PATIENT; Standing

## 2024-12-30 VITALS
RESPIRATION RATE: 15 BRPM | OXYGEN SATURATION: 94 % | WEIGHT: 175 LBS | TEMPERATURE: 98 F | SYSTOLIC BLOOD PRESSURE: 129 MMHG | HEART RATE: 88 BPM | HEIGHT: 69 IN | BODY MASS INDEX: 25.92 KG/M2 | DIASTOLIC BLOOD PRESSURE: 80 MMHG

## 2025-01-08 ENCOUNTER — OFFICE VISIT (OUTPATIENT)
Dept: NEUROSURGERY | Facility: CLINIC | Age: 45
End: 2025-01-08
Payer: COMMERCIAL

## 2025-01-08 ENCOUNTER — HOSPITAL ENCOUNTER (OUTPATIENT)
Dept: RADIOLOGY | Facility: HOSPITAL | Age: 45
Discharge: HOME | End: 2025-01-08
Attending: STUDENT IN AN ORGANIZED HEALTH CARE EDUCATION/TRAINING PROGRAM
Payer: COMMERCIAL

## 2025-01-08 VITALS
BODY MASS INDEX: 25.93 KG/M2 | DIASTOLIC BLOOD PRESSURE: 94 MMHG | SYSTOLIC BLOOD PRESSURE: 151 MMHG | WEIGHT: 175.06 LBS | RESPIRATION RATE: 18 BRPM | HEIGHT: 69 IN | HEART RATE: 91 BPM

## 2025-01-08 DIAGNOSIS — M50.10 CERVICAL DISC DISORDER WITH RADICULOPATHY: ICD-10-CM

## 2025-01-08 DIAGNOSIS — M48.02 CERVICAL SPINAL STENOSIS: ICD-10-CM

## 2025-01-08 DIAGNOSIS — M54.12 CERVICAL RADICULOPATHY: ICD-10-CM

## 2025-01-08 DIAGNOSIS — M50.30 DDD (DEGENERATIVE DISC DISEASE), CERVICAL: ICD-10-CM

## 2025-01-08 DIAGNOSIS — M54.12 CERVICAL RADICULOPATHY: Primary | ICD-10-CM

## 2025-01-08 PROCEDURE — 99215 OFFICE O/P EST HI 40 MIN: CPT | Mod: S$GLB,,, | Performed by: STUDENT IN AN ORGANIZED HEALTH CARE EDUCATION/TRAINING PROGRAM

## 2025-01-08 PROCEDURE — 72050 X-RAY EXAM NECK SPINE 4/5VWS: CPT | Mod: TC,FY,PO

## 2025-01-08 PROCEDURE — 3008F BODY MASS INDEX DOCD: CPT | Mod: CPTII,S$GLB,, | Performed by: STUDENT IN AN ORGANIZED HEALTH CARE EDUCATION/TRAINING PROGRAM

## 2025-01-08 PROCEDURE — 3080F DIAST BP >= 90 MM HG: CPT | Mod: CPTII,S$GLB,, | Performed by: STUDENT IN AN ORGANIZED HEALTH CARE EDUCATION/TRAINING PROGRAM

## 2025-01-08 PROCEDURE — 3077F SYST BP >= 140 MM HG: CPT | Mod: CPTII,S$GLB,, | Performed by: STUDENT IN AN ORGANIZED HEALTH CARE EDUCATION/TRAINING PROGRAM

## 2025-01-08 PROCEDURE — 72050 X-RAY EXAM NECK SPINE 4/5VWS: CPT | Mod: 26,,, | Performed by: RADIOLOGY

## 2025-01-09 ENCOUNTER — TELEPHONE (OUTPATIENT)
Dept: PAIN MEDICINE | Facility: CLINIC | Age: 45
End: 2025-01-09
Payer: COMMERCIAL

## 2025-01-09 DIAGNOSIS — M54.12 CERVICAL RADICULOPATHY: Primary | ICD-10-CM

## 2025-01-09 RX ORDER — SODIUM CHLORIDE, SODIUM LACTATE, POTASSIUM CHLORIDE, CALCIUM CHLORIDE 600; 310; 30; 20 MG/100ML; MG/100ML; MG/100ML; MG/100ML
INJECTION, SOLUTION INTRAVENOUS CONTINUOUS
OUTPATIENT
Start: 2025-01-09

## 2025-01-09 NOTE — TELEPHONE ENCOUNTER
Spoke with patient and scheduled. Advised to hold NSAIDs x 2 days prior. Pre op information given and follow up appointment scheduled. Pre -service notified of  add on case.

## 2025-01-09 NOTE — TELEPHONE ENCOUNTER
The patient is status post cervical JONNATHAN C7/T1 with 40-50% relief on 12/27/2024.  He continues to have right arm pain, somewhat less neck pain, not having any kenyetta weakness.  However the pain is severe enough that he is not able to do simple exercises and is affecting his ADLs.  He has met with Neurosurgery on 01/08/2025 and they have discussed possible surgery in February.  He would like to try 1 more epidural steroid injection to at least give some relief until then or perhaps stave off requiring surgery.    Please schedule him for next week for cervical JONNATHAN.  Physician - Dr Hayes    Type of Procedure/Injection - Cervical JONNATHAN C6/7 to the right           Laterality - NA      Priority - Normal      Anxiolysis- RNIV      Need to hold medication - Yes      NSAIDs for 2 days    None      Clearance needed - No      Follow up - 3 week

## 2025-01-13 ENCOUNTER — HOSPITAL ENCOUNTER (OUTPATIENT)
Dept: RADIOLOGY | Facility: HOSPITAL | Age: 45
Discharge: HOME | End: 2025-01-13
Attending: ANESTHESIOLOGY
Payer: COMMERCIAL

## 2025-01-13 ENCOUNTER — HOSPITAL ENCOUNTER (OUTPATIENT)
Facility: HOSPITAL | Age: 45
Discharge: HOME | End: 2025-01-13
Attending: ANESTHESIOLOGY | Admitting: ANESTHESIOLOGY
Payer: COMMERCIAL

## 2025-01-13 DIAGNOSIS — M54.2 NECK PAIN: ICD-10-CM

## 2025-01-13 DIAGNOSIS — M54.12 CERVICAL RADICULOPATHY: ICD-10-CM

## 2025-01-13 PROCEDURE — 62321 NJX INTERLAMINAR CRV/THRC: CPT | Mod: PO | Performed by: ANESTHESIOLOGY

## 2025-01-13 PROCEDURE — A4216 STERILE WATER/SALINE, 10 ML: HCPCS | Mod: PO | Performed by: ANESTHESIOLOGY

## 2025-01-13 PROCEDURE — 62321 NJX INTERLAMINAR CRV/THRC: CPT | Mod: ,,, | Performed by: ANESTHESIOLOGY

## 2025-01-13 PROCEDURE — 63600175 PHARM REV CODE 636 W HCPCS: Mod: JZ,TB,PO | Performed by: ANESTHESIOLOGY

## 2025-01-13 PROCEDURE — 25500020 PHARM REV CODE 255: Mod: PO | Performed by: ANESTHESIOLOGY

## 2025-01-13 PROCEDURE — 25000003 PHARM REV CODE 250: Mod: PO | Performed by: ANESTHESIOLOGY

## 2025-01-13 RX ORDER — LIDOCAINE HYDROCHLORIDE 10 MG/ML
INJECTION, SOLUTION EPIDURAL; INFILTRATION; INTRACAUDAL; PERINEURAL
Status: DISCONTINUED | OUTPATIENT
Start: 2025-01-13 | End: 2025-01-13 | Stop reason: HOSPADM

## 2025-01-13 RX ORDER — SODIUM CHLORIDE 9 MG/ML
INJECTION, SOLUTION INTRAMUSCULAR; INTRAVENOUS; SUBCUTANEOUS
Status: DISCONTINUED | OUTPATIENT
Start: 2025-01-13 | End: 2025-01-13 | Stop reason: HOSPADM

## 2025-01-13 RX ORDER — HYDROCODONE BITARTRATE AND ACETAMINOPHEN 10; 325 MG/1; MG/1
1 TABLET ORAL ONCE
Status: COMPLETED | OUTPATIENT
Start: 2025-01-13 | End: 2025-01-13

## 2025-01-13 RX ORDER — SODIUM CHLORIDE, SODIUM LACTATE, POTASSIUM CHLORIDE, CALCIUM CHLORIDE 600; 310; 30; 20 MG/100ML; MG/100ML; MG/100ML; MG/100ML
INJECTION, SOLUTION INTRAVENOUS CONTINUOUS
Status: DISCONTINUED | OUTPATIENT
Start: 2025-01-13 | End: 2025-01-13 | Stop reason: HOSPADM

## 2025-01-13 RX ORDER — IBUPROFEN 200 MG
200 TABLET ORAL EVERY 6 HOURS PRN
COMMUNITY
End: 2025-02-03

## 2025-01-13 RX ORDER — FENTANYL CITRATE 50 UG/ML
INJECTION, SOLUTION INTRAMUSCULAR; INTRAVENOUS
Status: DISCONTINUED | OUTPATIENT
Start: 2025-01-13 | End: 2025-01-13 | Stop reason: HOSPADM

## 2025-01-13 RX ORDER — METHYLPREDNISOLONE ACETATE 80 MG/ML
INJECTION, SUSPENSION INTRA-ARTICULAR; INTRALESIONAL; INTRAMUSCULAR; SOFT TISSUE
Status: DISCONTINUED | OUTPATIENT
Start: 2025-01-13 | End: 2025-01-13 | Stop reason: HOSPADM

## 2025-01-13 RX ORDER — MIDAZOLAM HYDROCHLORIDE 1 MG/ML
INJECTION INTRAMUSCULAR; INTRAVENOUS
Status: DISCONTINUED | OUTPATIENT
Start: 2025-01-13 | End: 2025-01-13 | Stop reason: HOSPADM

## 2025-01-13 RX ADMIN — HYDROCODONE BITARTRATE AND ACETAMINOPHEN 1 TABLET: 10; 325 TABLET ORAL at 04:01

## 2025-01-13 NOTE — DISCHARGE SUMMARY
[facility] - Surgery  Discharge Note  Short Stay    Procedure(s) (LRB):  Injection-steroid-epidural-cervical  C6/7 to the right (N/A)      OUTCOME: Patient tolerated treatment/procedure well without complication and is now ready for discharge.    DISPOSITION: Home or Self Care    FINAL DIAGNOSIS:  Cervical radiculopathy    FOLLOWUP: In clinic    DISCHARGE INSTRUCTIONS:    Discharge Procedure Orders   Diet Adult Regular     No dressing needed     Notify your health care provider if you experience any of the following:  temperature >100.4     Activity as tolerated

## 2025-01-13 NOTE — H&P
CC: Neck pain    HPI: The patient is a 43yo man with a history of cervical radiculopathy here for cervical JONNATHAN 6/7 to the right. There are no major changes in history and physical from 1/8/25 by Dr. Ernesto Hansen.    Past Medical History:   Diagnosis Date    Depression     Hematuria     Testosterone deficiency        Past Surgical History:   Procedure Laterality Date    CAUDAL EPIDURAL STEROID INJECTION N/A 8/5/2024    Procedure: Injection-steroid-epidural-caudal;  Surgeon: Reno Yu MD;  Location: [facility] OR;  Service: Pain Management;  Laterality: N/A;    DESTRUCTION, INTRAOSSEOUS BASIVERTEBRAL NERVE, 1ST TWO BODIES, LUM/SAC N/A 12/23/2024    Procedure: DESTRUCTION,INTRAOSSEOUS BASIVERTEBRAL NERVE,1ST TWO BODIES,LUM/SAC    L5 and S1;  Surgeon: Marcelino Hayes MD;  Location: [facility];  Service: Pain Management;  Laterality: N/A;  normal    deviated septum repair      EPIDURAL STEROID INJECTION INTO CERVICAL SPINE N/A 12/27/2024    Procedure: Injection-steroid-epidural-cervical C7/T1 to the right;  Surgeon: Marcelino Hayes MD;  Location: [facility];  Service: Pain Management;  Laterality: N/A;  Dr. Hayes asked us to work this in    EPIDURAL STEROID INJECTION INTO LUMBAR SPINE N/A 7/22/2022    Procedure: Injection-steroid-epidural-lumbar L5/S1;  Surgeon: Reno Yu MD;  Location: [facility];  Service: Pain Management;  Laterality: N/A;    ESOPHAGOGASTRODUODENOSCOPY N/A 11/11/2020    Procedure: EGD (ESOPHAGOGASTRODUODENOSCOPY);  Surgeon: Yunior Stevens MD;  Location: [facility];  Service: Endoscopy;  Laterality: N/A;    ESOPHAGOGASTRODUODENOSCOPY N/A 12/4/2023    Procedure: EGD (ESOPHAGOGASTRODUODENOSCOPY);  Surgeon: Mike Chambers MD;  Location: [facility];  Service: Gastroenterology;  Laterality: N/A;    INJECTION OF ANESTHETIC AGENT AROUND NERVE Bilateral 6/12/2024    Procedure: BLOCK, NERVE BILATERAL L3, 4, 5 MEDIAL BRANCH;  Surgeon: Mateus Vargas MD;  Location: [facility];  Service:  Pain Management;  Laterality: Bilateral;  [phone]    INJECTION OF FACET JOINT Bilateral 11/3/2021    Procedure: INJECTION, FACET JOINT BILATERAL L4/5, L5/S1;  Surgeon: Mateus Vargas MD;  Location: [facility];  Service: Pain Management;  Laterality: Bilateral;    TRANSFORAMINAL EPIDURAL INJECTION OF STEROID Bilateral 8/16/2022    Procedure: Injection,steroid,epidural,transforaminal approach L5/S1;  Surgeon: Reno Yu MD;  Location: [facility] OR;  Service: Pain Management;  Laterality: Bilateral;    TRANSFORAMINAL EPIDURAL INJECTION OF STEROID Bilateral 7/7/2023    Procedure: Injection,steroid,epidural,transforaminal approach;  Surgeon: Reno Yu MD;  Location: [facility] OR;  Service: Pain Management;  Laterality: Bilateral;  L5/S1       Family History   Problem Relation Name Age of Onset    Alzheimer's disease Mother Mirela Bae     Early death Mother Mirela Bae     No Known Problems Father      Heart disease Maternal Grandfather Roge Do     Glaucoma Neg Hx      Macular degeneration Neg Hx      Retinal detachment Neg Hx         Social History     Socioeconomic History    Marital status:    Tobacco Use    Smoking status: Never    Smokeless tobacco: Never   Substance and Sexual Activity    Alcohol use: Not Currently     Alcohol/week: 10.0 standard drinks of alcohol     Types: 10 Glasses of wine per week     Comment: stopped in 2021, 4-6 times per week, 1/2 wine bottle per night    Drug use: Not Currently     Types: Amphetamines    Sexual activity: Yes     Partners: Female     Birth control/protection: None     Comment: partner has Mirena   Social History Narrative    January 4    Zia 16 mos    Works as .     Social Drivers of Health     Financial Resource Strain: Low Risk  (4/23/2024)    Overall Financial Resource Strain (CARDIA)     Difficulty of Paying Living Expenses: Not hard at all   Food Insecurity: No Food Insecurity (4/23/2024)    Hunger Vital Sign     Worried  "About Running Out of Food in the Last Year: Never true     Ran Out of Food in the Last Year: Never true   Transportation Needs: Unmet Transportation Needs (4/23/2024)    PRAPARE - Transportation     Lack of Transportation (Medical): Yes     Lack of Transportation (Non-Medical): No   Physical Activity: Sufficiently Active (4/23/2024)    Exercise Vital Sign     Days of Exercise per Week: 7 days     Minutes of Exercise per Session: 70 min   Stress: No Stress Concern Present (4/23/2024)    [employer] [place] [employer] - Occupational Stress Questionnaire     Feeling of Stress : Only a little   Housing Stability: Low Risk  (2/4/2022)    Housing Stability Vital Sign     Unable to Pay for Housing in the Last Year: No     Number of Places Lived in the Last Year: 1     Unstable Housing in the Last Year: No       No current facility-administered medications for this encounter.       Review of patient's allergies indicates:  No Known Allergies    Vitals:    01/09/25 0915 01/13/25 1603   BP:  (!) 140/88   Pulse:  98   Temp:  97.8 °F (36.6 °C)   TempSrc:  Skin   SpO2:  98%   Weight: 79.4 kg (175 lb)    Height: 5' 9" (1.753 m)        ASA 2, Mallampati 2    REVIEW OF SYSTEMS:     GENERAL: No weight loss, malaise or fevers.  HEENT:  No recent changes in vision or hearing  NECK: Negative for lumps, no difficulty with swallowing.  RESPIRATORY: Negative for cough, wheezing or shortness of breath, patient denies any recent URI.  CARDIOVASCULAR: Negative for chest pain, leg swelling or palpitations.  GI: Negative for abdominal discomfort, blood in stools or black stools or change in bowel habits.  MUSCULOSKELETAL: See HPI.  SKIN: Negative for lesions, rash, and itching.  PSYCH: No suicidal or homicidal ideations, no current mood disturbances.  HEMATOLOGY/LYMPHOLOGY: Negative for prolonged bleeding, bruising easily or swollen nodes. Patient is not currently taking any anti-coagulants  ENDO: No history of diabetes or thyroid " dysfunction  NEURO: No history of syncope, paralysis, seizures or tremors.All other reviewed and negative other than HPI.    Physical exam:  Gen: A and O x3, pleasant, well-groomed  Skin: No rashes or obvious lesions  HEENT: PERRLA, no obvious deformities on ears or in canals. No thyroid masses, trachea midline, no palpable lymph nodes in neck, axilla.  CVS: Regular rate and rhythm, normal S1 and S2, no murmurs.  Resp: Clear to auscultation bilaterally.  Abdomen: Soft, NT/ND, normal bowel sounds present.  Musculoskeletal/Neuro: Moving all extremities    Assessment:  Cervical radiculopathy  -     Place in Outpatient; Standing  -     Vital signs; Standing  -     [facility] ; Standing  -     Verify informed consent; Standing  -     Notify physician ; Standing  -     Notify physician ; Standing  -     Notify physician (specify); Standing  -     Diet NPO; Standing  -     lactated ringers infusion    Other orders  -     IP VTE LOW RISK PATIENT; Standing

## 2025-01-13 NOTE — OP NOTE
PROCEDURE DATE: 1/13/2025    Procedure: C6/7 cervical interlaminar epidural steroid injection under utilizing fluoroscopy.    Diagnosis: Cervical Radiculopathy    POSTOP DIAGNOSIS: SAME    Physician: Marcelino Hayes MD    Medications injected:  Methylprednisone 80mg followed by a slow injection of 4 mL sterile, preservative-free normal saline.    Local anesthetic used: Lidocaine 1%, 4 ml.    Sedation Medications: 4mg versed, 75mcg fentanyl    Complications:  none    Estimated blood loss: none    Technique:  A time-out was taken to identify patient and procedure prior to starting the procedure.  With the patient laying in a prone position with the neck in a mid-flexed forward position, the area was prepped and draped in the usual sterile fashion using ChloraPrep and a fenestrated drape.  The area was determined under AP fluoroscopic guidance.  Local anesthetic was given using a 25-gauge 1.5 inch needle by raising a wheal and then infiltrating ventrally.  A 100 inch 20-gauge Touhy needle was introduced under fluoroscopic guidance to meet the lamina of C7.  The needle was then hinged under the lamina then advanced using loss of resistance technique.  Once the tip of the needle was in the desired position, the contrast dye Omnipaque was injected to determine placement and no uptake.  The steroid was then injected slowly followed by a slow injection of 4 mL of the sterile preservative-free normal saline.  The patient tolerated the procedure well.    The patient was monitored after the procedure and was given post-procedure and discharge instructions to follow at home. The patient was discharged in a stable condition.

## 2025-01-14 VITALS
RESPIRATION RATE: 16 BRPM | OXYGEN SATURATION: 94 % | WEIGHT: 175 LBS | DIASTOLIC BLOOD PRESSURE: 78 MMHG | HEIGHT: 69 IN | BODY MASS INDEX: 25.92 KG/M2 | SYSTOLIC BLOOD PRESSURE: 147 MMHG | TEMPERATURE: 98 F | HEART RATE: 90 BPM

## 2025-01-31 NOTE — PROGRESS NOTES
[facility] Neurosurgery - [facility]  Clinic Consult     Consult Requested By: Karl Crowley MD, [facility], [facility]*        SUBJECTIVE:     Chief Complaint: No chief complaint on file.      History of Present Illness:  Jorge Bae III is a 44 y.o. male who presents for evaluation of cervical stenosis with radiculopathy.  Unfortunately he has had very severe symptoms these were disabling difficulty getting out of bed or finding comfortable positions disrupting ADLs with severe radicular pain in his arm.  Describes some intermittent numbness/paresthesias and would mainly pain limited weakness and coordination, more so with a true focal deficit.  He had 1 cervical epidural with improvement.  He has improved but has residual symptoms and plans for an additional JONNATHAN    Normal bowel bladder function    He has managed his lower back was a intraseptal procedure recently        Pertinent and Recent history, provider evaluations, imaging and data reviewed in EPIC         Other pertinent ROS negative      Diagnostic Results:  I have independently reviewed the following imaging:    Impression:     1. Degenerative changes including progressive right lateralizing disc protrusion at C6-C7 producing moderate spinal canal narrowing and mild cord compression.  This contributes to severe right foraminal narrowing.  2. Similar right lateralizing disc protrusion at C5-C6 moderately narrowing the spinal canal also producing mild cord compression.  Moderate to severe right and moderate left foraminal narrowing at this level.  3. Moderate to severe right/moderate left C4-C5 and severe left C2-C3 as well as moderate bilateral C3-C4 foraminal narrowing.  4. Active inflammatory facet arthropathy changes on the left at C2-C3.        Electronically signed by:Carroll Sosa  Date:                                            12/23/2024  Time:                                           17:07      Review of patient's  allergies indicates:  No Known Allergies    Past Medical History:   Diagnosis Date    Depression     Hematuria     Testosterone deficiency      Past Surgical History:   Procedure Laterality Date    CAUDAL EPIDURAL STEROID INJECTION N/A 8/5/2024    Procedure: Injection-steroid-epidural-caudal;  Surgeon: Reno Yu MD;  Location: [facility] OR;  Service: Pain Management;  Laterality: N/A;    DESTRUCTION, INTRAOSSEOUS BASIVERTEBRAL NERVE, 1ST TWO BODIES, LUM/SAC N/A 12/23/2024    Procedure: DESTRUCTION,INTRAOSSEOUS BASIVERTEBRAL NERVE,1ST TWO BODIES,LUM/SAC    L5 and S1;  Surgeon: Marcelino Hayes MD;  Location: [facility] OR;  Service: Pain Management;  Laterality: N/A;  normal    deviated septum repair      EPIDURAL STEROID INJECTION INTO CERVICAL SPINE N/A 12/27/2024    Procedure: Injection-steroid-epidural-cervical C7/T1 to the right;  Surgeon: Marcelino Hayes MD;  Location: [facility] OR;  Service: Pain Management;  Laterality: N/A;  Dr. Hayes asked us to work this in    EPIDURAL STEROID INJECTION INTO CERVICAL SPINE N/A 1/13/2025    Procedure: Injection-steroid-epidural-cervical  C6/7 to the right;  Surgeon: Marcelino Hayes MD;  Location: [facility] OR;  Service: Pain Management;  Laterality: N/A;  normal    EPIDURAL STEROID INJECTION INTO LUMBAR SPINE N/A 7/22/2022    Procedure: Injection-steroid-epidural-lumbar L5/S1;  Surgeon: Reno Yu MD;  Location: [facility] OR;  Service: Pain Management;  Laterality: N/A;    ESOPHAGOGASTRODUODENOSCOPY N/A 11/11/2020    Procedure: EGD (ESOPHAGOGASTRODUODENOSCOPY);  Surgeon: Yunior Stevens MD;  Location: [facility];  Service: Endoscopy;  Laterality: N/A;    ESOPHAGOGASTRODUODENOSCOPY N/A 12/4/2023    Procedure: EGD (ESOPHAGOGASTRODUODENOSCOPY);  Surgeon: Mike Chambers MD;  [facility];  Service: Gastroenterology;  Laterality: N/A;    INJECTION OF ANESTHETIC AGENT AROUND NERVE Bilateral 6/12/2024    Procedure: BLOCK, NERVE BILATERAL L3, 4, 5 MEDIAL BRANCH;   Surgeon: Mateus Vargas MD;  Location: [facility];  Service: Pain Management;  Laterality: Bilateral;  [phone]    INJECTION OF FACET JOINT Bilateral 11/3/2021    Procedure: INJECTION, FACET JOINT BILATERAL L4/5, L5/S1;  Surgeon: Mateus Vargas MD;  Location: [facility];  Service: Pain Management;  Laterality: Bilateral;    TRANSFORAMINAL EPIDURAL INJECTION OF STEROID Bilateral 8/16/2022    Procedure: Injection,steroid,epidural,transforaminal approach L5/S1;  Surgeon: Reno Yu MD;  Location: [facility] OR;  Service: Pain Management;  Laterality: Bilateral;    TRANSFORAMINAL EPIDURAL INJECTION OF STEROID Bilateral 7/7/2023    Procedure: Injection,steroid,epidural,transforaminal approach;  Surgeon: Reno Yu MD;  Location: [facility] OR;  Service: Pain Management;  Laterality: Bilateral;  L5/S1     Family History   Problem Relation Name Age of Onset    Alzheimer's disease Mother Mirela Bae     Early death Mother Mirela Bae     No Known Problems Father      Heart disease Maternal Grandfather Roge Do     Glaucoma Neg Hx      Macular degeneration Neg Hx      Retinal detachment Neg Hx       Social History     Tobacco Use    Smoking status: Never    Smokeless tobacco: Never   Substance Use Topics    Alcohol use: Not Currently     Alcohol/week: 10.0 standard drinks of alcohol     Types: 10 Glasses of wine per week     Comment: stopped in 2021, 4-6 times per week, 1/2 wine bottle per night    Drug use: Not Currently     Types: Amphetamines        Review of Systems:      Constitutional: no fever, chills or night sweats. No abrupt changes in weight   Eyes: no diplopia, lid drooping, loss of vision   ENT: no nasal congestion, sore throat, discharge  Respiratory: no cough, shortness of breath, or pain  Cardiovascular: no chest pain or palpitations   Gastrointestinal: no nausea or vomiting      OBJECTIVE:     Vital Signs (Most Recent):  Pulse: 91 (01/08/25 1426)  Resp: 18 (01/08/25 1426)  BP: (!)  151/94 (01/08/25 1426)    Physical Exam:      General: well developed, well nourished, no distress. .  Mental Status: Awake, Alert, Oriented x 4  Language: No aphasia  Speech: No dysarthria  Head: normocephalic, atraumatic.  Neck: trachea midline, no JVD   Cardiovascular: no LE edema  Pulmonary: normal respirations, no signs of respiratory distress      Motor Strength:  No abnormal movements seen.     Strength  Deltoids Triceps Biceps Wrist Extension Wrist Flexion Hand  Interossei     Upper: R 5/5 5/5 5/5 5/5 5/5 5/5 5/5      L 5/5 5/5 5/5 5/5 5/5 5/5 5/5       Iliopsoas Quadriceps  Tibialis  anterior Gastro- cnemius EHL      Lower: R 5/5 5/5  5/5 5/5 5/5       L 5/5 5/5  5/5 5/5 5/5        SILT,PP  Pain with extension/range of motion    DTR's: 2 + and symmetric in UE and LE  Dodge: absent  Clonus: absent    Sit to Stand: normal  Gait: normal                  Shoulder: passive rom pain: -                   Internal rotation: -                   External rotation: -        ASSESSMENT/PLAN:     Cervical radiculopathy  -     X-Ray Cervical Spine AP Lat with Flexion  Extension; Future; Expected date: 01/08/2025    DDD (degenerative disc disease), cervical    Cervical spinal stenosis    Cervical disc disorder with radiculopathy      44-year-old gentleman with acute severe cervical radiculopathy on the right  He has complex cervical imaging with multilevel spondylosis from C2-C7, however at C5-6 there is a disc osteophyte complex displacing the ventral thecal sac right-sided foraminal stenosis and at C6-7 there appears to be a more acute disc herniation in the posterolateral space and into the foramen with severe stenosis  Multilevel degeneration most likely symptomatic 1.  From C6-7 2. From C5-6 we will C6-7 radiculopathy  He is managed with the severe pain but is improving had JONNATHAN, activity modification  We discussed different options risks benefits pros and cons of each including surgery which would require an  anterior cervical diskectomy and the question of a total disc replacement versus ACDF  With some proximal degeneration slight kyphosis the neutral at these index symptomatic levels of C5-6 and C6-7    I he would like to proceed with continued conservative management is going to get a additionally epidural steroid injection monitor his symptoms and will let us know how he is doing we will complaint follow up accordingly he has he has a establish care and follow up with pain management she just symptoms improvement remained stable              Ernesto Hansen MD  Neurosurgery

## 2025-02-03 ENCOUNTER — OFFICE VISIT (OUTPATIENT)
Dept: FAMILY MEDICINE | Facility: CLINIC | Age: 45
End: 2025-02-03
Payer: COMMERCIAL

## 2025-02-03 ENCOUNTER — OFFICE VISIT (OUTPATIENT)
Dept: CARDIOLOGY | Facility: CLINIC | Age: 45
End: 2025-02-03
Payer: COMMERCIAL

## 2025-02-03 ENCOUNTER — HOSPITAL ENCOUNTER (OUTPATIENT)
Dept: RADIOLOGY | Facility: HOSPITAL | Age: 45
Discharge: HOME | End: 2025-02-03
Attending: NURSE PRACTITIONER
Payer: COMMERCIAL

## 2025-02-03 ENCOUNTER — TELEPHONE (OUTPATIENT)
Dept: FAMILY MEDICINE | Facility: CLINIC | Age: 45
End: 2025-02-03
Payer: COMMERCIAL

## 2025-02-03 VITALS
DIASTOLIC BLOOD PRESSURE: 76 MMHG | HEIGHT: 69 IN | OXYGEN SATURATION: 98 % | HEART RATE: 75 BPM | WEIGHT: 172.19 LBS | SYSTOLIC BLOOD PRESSURE: 142 MMHG | BODY MASS INDEX: 25.5 KG/M2

## 2025-02-03 VITALS
HEIGHT: 69 IN | DIASTOLIC BLOOD PRESSURE: 81 MMHG | BODY MASS INDEX: 25.21 KG/M2 | WEIGHT: 170.19 LBS | SYSTOLIC BLOOD PRESSURE: 134 MMHG | HEART RATE: 82 BPM

## 2025-02-03 DIAGNOSIS — Z71.89 ENCOUNTER FOR CARDIAC RISK COUNSELING: ICD-10-CM

## 2025-02-03 DIAGNOSIS — R00.0 TACHYCARDIA: Primary | ICD-10-CM

## 2025-02-03 DIAGNOSIS — R00.2 PALPITATIONS: Primary | ICD-10-CM

## 2025-02-03 DIAGNOSIS — Z13.6 ENCOUNTER FOR SCREENING FOR CARDIOVASCULAR DISORDERS: ICD-10-CM

## 2025-02-03 DIAGNOSIS — I10 PRIMARY HYPERTENSION: ICD-10-CM

## 2025-02-03 DIAGNOSIS — R07.89 ATYPICAL CHEST PAIN: ICD-10-CM

## 2025-02-03 DIAGNOSIS — G47.09 OTHER INSOMNIA: ICD-10-CM

## 2025-02-03 DIAGNOSIS — R00.2 PALPITATIONS: ICD-10-CM

## 2025-02-03 DIAGNOSIS — R93.1 ELEVATED CORONARY ARTERY CALCIUM SCORE: Primary | ICD-10-CM

## 2025-02-03 PROBLEM — Z97.3 WEARS CONTACT LENSES: Status: RESOLVED | Noted: 2023-11-30 | Resolved: 2025-02-03

## 2025-02-03 PROBLEM — F32.9 MAJOR DEPRESSION, CHRONIC: Status: RESOLVED | Noted: 2020-02-06 | Resolved: 2025-02-03

## 2025-02-03 PROBLEM — M45.0 ANKYLOSING SPONDYLITIS OF MULTIPLE SITES IN SPINE: Status: RESOLVED | Noted: 2024-10-23 | Resolved: 2025-02-03

## 2025-02-03 PROBLEM — M47.897 OTHER SPONDYLOSIS, LUMBOSACRAL REGION: Status: RESOLVED | Noted: 2021-12-20 | Resolved: 2025-02-03

## 2025-02-03 PROBLEM — R74.8 ELEVATED CK: Status: RESOLVED | Noted: 2022-05-06 | Resolved: 2025-02-03

## 2025-02-03 PROBLEM — M54.51 VERTEBROGENIC LOW BACK PAIN: Status: RESOLVED | Noted: 2024-12-23 | Resolved: 2025-02-03

## 2025-02-03 PROBLEM — M46.1 SACROILIITIS: Status: RESOLVED | Noted: 2024-07-05 | Resolved: 2025-02-03

## 2025-02-03 PROBLEM — Z78.9 ALCOHOL USE: Status: RESOLVED | Noted: 2020-02-06 | Resolved: 2025-02-03

## 2025-02-03 PROBLEM — F10.90 ALCOHOL USE: Status: RESOLVED | Noted: 2020-02-06 | Resolved: 2025-02-03

## 2025-02-03 PROBLEM — M54.16 LUMBAR RADICULOPATHY: Status: RESOLVED | Noted: 2024-12-27 | Resolved: 2025-02-03

## 2025-02-03 PROBLEM — R29.898 DECREASED STRENGTH OF TRUNK AND BACK: Status: RESOLVED | Noted: 2021-02-05 | Resolved: 2025-02-03

## 2025-02-03 PROBLEM — R79.89 ELEVATED LFTS: Status: RESOLVED | Noted: 2020-02-06 | Resolved: 2025-02-03

## 2025-02-03 PROBLEM — R93.89 ABNORMAL CXR: Status: RESOLVED | Noted: 2020-02-06 | Resolved: 2025-02-03

## 2025-02-03 PROBLEM — F33.0 MILD RECURRENT MAJOR DEPRESSION: Status: RESOLVED | Noted: 2022-02-07 | Resolved: 2025-02-03

## 2025-02-03 PROBLEM — M25.69 DECREASED RANGE OF MOTION OF TRUNK AND BACK: Status: RESOLVED | Noted: 2021-02-05 | Resolved: 2025-02-03

## 2025-02-03 PROBLEM — R29.3 POOR POSTURE: Status: RESOLVED | Noted: 2021-02-05 | Resolved: 2025-02-03

## 2025-02-03 PROBLEM — R74.8 ELEVATED LIVER ENZYMES: Status: RESOLVED | Noted: 2022-04-04 | Resolved: 2025-02-03

## 2025-02-03 LAB
OHS QRS DURATION: 90 MS
OHS QTC CALCULATION: 371 MS

## 2025-02-03 PROCEDURE — 3075F SYST BP GE 130 - 139MM HG: CPT | Mod: CPTII,S$GLB,, | Performed by: INTERNAL MEDICINE

## 2025-02-03 PROCEDURE — 99204 OFFICE O/P NEW MOD 45 MIN: CPT | Mod: S$GLB,,, | Performed by: INTERNAL MEDICINE

## 2025-02-03 PROCEDURE — 1159F MED LIST DOCD IN RCRD: CPT | Mod: CPTII,S$GLB,, | Performed by: EMERGENCY MEDICINE

## 2025-02-03 PROCEDURE — 1159F MED LIST DOCD IN RCRD: CPT | Mod: CPTII,S$GLB,, | Performed by: INTERNAL MEDICINE

## 2025-02-03 PROCEDURE — 75571 CT HRT W/O DYE W/CA TEST: CPT | Mod: 26,,, | Performed by: STUDENT IN AN ORGANIZED HEALTH CARE EDUCATION/TRAINING PROGRAM

## 2025-02-03 PROCEDURE — 3077F SYST BP >= 140 MM HG: CPT | Mod: CPTII,S$GLB,, | Performed by: EMERGENCY MEDICINE

## 2025-02-03 PROCEDURE — 75571 CT HRT W/O DYE W/CA TEST: CPT | Mod: TC,PO

## 2025-02-03 PROCEDURE — 99999 PR PBB SHADOW E&M-EST. PATIENT-LVL III: CPT | Mod: PBBFAC,,, | Performed by: EMERGENCY MEDICINE

## 2025-02-03 PROCEDURE — 4010F ACE/ARB THERAPY RXD/TAKEN: CPT | Mod: CPTII,S$GLB,, | Performed by: EMERGENCY MEDICINE

## 2025-02-03 PROCEDURE — 4010F ACE/ARB THERAPY RXD/TAKEN: CPT | Mod: CPTII,S$GLB,, | Performed by: INTERNAL MEDICINE

## 2025-02-03 PROCEDURE — 3008F BODY MASS INDEX DOCD: CPT | Mod: CPTII,S$GLB,, | Performed by: EMERGENCY MEDICINE

## 2025-02-03 PROCEDURE — G2211 COMPLEX E/M VISIT ADD ON: HCPCS | Mod: S$GLB,,, | Performed by: INTERNAL MEDICINE

## 2025-02-03 PROCEDURE — 99214 OFFICE O/P EST MOD 30 MIN: CPT | Mod: S$GLB,,, | Performed by: EMERGENCY MEDICINE

## 2025-02-03 PROCEDURE — 3008F BODY MASS INDEX DOCD: CPT | Mod: CPTII,S$GLB,, | Performed by: INTERNAL MEDICINE

## 2025-02-03 PROCEDURE — 99999 PR PBB SHADOW E&M-EST. PATIENT-LVL III: CPT | Mod: PBBFAC,,, | Performed by: INTERNAL MEDICINE

## 2025-02-03 PROCEDURE — 93010 ELECTROCARDIOGRAM REPORT: CPT | Mod: S$GLB,,, | Performed by: INTERNAL MEDICINE

## 2025-02-03 PROCEDURE — 3078F DIAST BP <80 MM HG: CPT | Mod: CPTII,S$GLB,, | Performed by: EMERGENCY MEDICINE

## 2025-02-03 PROCEDURE — 3079F DIAST BP 80-89 MM HG: CPT | Mod: CPTII,S$GLB,, | Performed by: INTERNAL MEDICINE

## 2025-02-03 PROCEDURE — 1160F RVW MEDS BY RX/DR IN RCRD: CPT | Mod: CPTII,S$GLB,, | Performed by: EMERGENCY MEDICINE

## 2025-02-03 PROCEDURE — 93005 ELECTROCARDIOGRAM TRACING: CPT | Mod: S$GLB,,, | Performed by: EMERGENCY MEDICINE

## 2025-02-03 RX ORDER — ASPIRIN 81 MG/1
81 TABLET ORAL DAILY
COMMUNITY
Start: 2025-02-03 | End: 2026-02-03

## 2025-02-03 RX ORDER — LOSARTAN POTASSIUM 50 MG/1
50 TABLET ORAL DAILY
COMMUNITY

## 2025-02-03 RX ORDER — ROSUVASTATIN CALCIUM 20 MG/1
20 TABLET, COATED ORAL DAILY
COMMUNITY

## 2025-02-03 RX ORDER — ALPRAZOLAM 0.5 MG/1
0.5 TABLET ORAL NIGHTLY PRN
Qty: 30 TABLET | Refills: 0 | Status: SHIPPED | OUTPATIENT
Start: 2025-02-03 | End: 2025-03-05

## 2025-02-03 NOTE — PROGRESS NOTES
"Subjective:    Patient ID:  Jorge Bae III is a 44 y.o. male who presents for evaluation of Palpitations and Shortness of Breath      Problem List Items Addressed This Visit          Cardiac/Vascular    Elevated coronary artery calcium score - Primary    Overview     2-3-25    Agatston calcium score equals 263.          Relevant Orders    CV Ultrasound Bilateral Doppler Carotid    Stress Echo Which stress agent will be used? Treadmill Exercise    Lipid Panel    Lipoprotein A (LPA)    Apolipoprotein B    CRP, High Sensitivity     Other Visit Diagnoses       Encounter for cardiac risk counseling        Palpitations        Atypical chest pain        Relevant Orders    CV Ultrasound Bilateral Doppler Carotid    Stress Echo Which stress agent will be used? Treadmill Exercise    Lipid Panel    Lipoprotein A (LPA)    Apolipoprotein B    CRP, High Sensitivity          Still establish care    History of Present Illness    CHIEF COMPLAINT:  Mr. Bae presents today for evaluation of elevated calcium score and bounding heart rate    CARDIOVASCULAR:  He has had racing heart which has significantly impacted his sleep, reporting 4-5 sleepless nights. Did get good sleep last night and starting to feel better. He was started on losartan and crestor 3 days ago by another provider. His blood pressure readings were 160/110 over the weekend and 150/90 this morning. Today's BP below. He experienced tachycardia for about a month following his first COVID-19 vaccine.     BACK INJURY:  He has a recent back injury for which he was prescribed gabapentin and tizanidine. He began tapering off these medications approximately 1.5 weeks ago. Bounding hearty rate symptoms started around the time of cessation of these medications    DIET AND EXERCISE:  He follows a "keto-grady" diet high in fat and protein, supplementing with AG1 (Athletic Greens) every morning for greens. He recently has been practicing intermittent fasting with first " meal between 1:00-2:00 PM. He has maintained regular exercise for 15 years after overcoming a period of poor health. He typically exercises 7 days per week, but is currently limited to walking and stretching secondary to his back injury. Previously, he could exercise up to two hours on a stair master.    PAST MEDICAL HISTORY:  He denies any history of heart attack, stroke, stent placement, or angiogram.    FAMILY HISTORY:  His paternal grandfather underwent open heart surgery in his 70s. Both parents are without cardiac issues.    Parts of this note were transcribed using voice recognition and generative artificial intelligence software ([employer]).  Please excuse any grammatical or syntax errors and reach out to me with any questions or clarifications needed.             Past Medical History:   Diagnosis Date    Abnormal CXR 02/06/2020    Decreased range of motion of trunk and back 02/05/2021    Decreased strength of trunk and back 02/05/2021    Depression     Elevated CK 05/06/2022    Elevated LFTs 02/06/2020    Elevated liver enzymes 04/04/2022    Hematuria     Lumbar radiculopathy 12/27/2024    Major depression, chronic 02/06/2020    Mild recurrent major depression 02/07/2022    Other spondylosis, lumbosacral region 12/20/2021    Poor posture 02/05/2021    Sacroiliitis 07/05/2024    Testosterone deficiency     Vertebrogenic low back pain 12/23/2024    Wears contact lenses 11/30/2023       Past Surgical History:   Procedure Laterality Date    CAUDAL EPIDURAL STEROID INJECTION N/A 8/5/2024    Procedure: Injection-steroid-epidural-caudal;  Surgeon: Reno Yu MD;  Location: [facility] OR;  Service: Pain Management;  Laterality: N/A;    DESTRUCTION, INTRAOSSEOUS BASIVERTEBRAL NERVE, 1ST TWO BODIES, LUM/SAC N/A 12/23/2024    Procedure: DESTRUCTION,INTRAOSSEOUS BASIVERTEBRAL NERVE,1ST TWO BODIES,LUM/SAC    L5 and S1;  Surgeon: Marcelino Hayes MD;  Location: [facility] OR;  Service: Pain Management;   Laterality: N/A;  normal    deviated septum repair      EPIDURAL STEROID INJECTION INTO CERVICAL SPINE N/A 12/27/2024    Procedure: Injection-steroid-epidural-cervical C7/T1 to the right;  Surgeon: Marcelino Hayes MD;  Location: [facility];  Service: Pain Management;  Laterality: N/A;  Dr. Hayes asked us to work this in    EPIDURAL STEROID INJECTION INTO CERVICAL SPINE N/A 1/13/2025    Procedure: Injection-steroid-epidural-cervical  C6/7 to the right;  Surgeon: Marcelino Hayes MD;  Location: [facility];  Service: Pain Management;  Laterality: N/A;  normal    EPIDURAL STEROID INJECTION INTO LUMBAR SPINE N/A 7/22/2022    Procedure: Injection-steroid-epidural-lumbar L5/S1;  Surgeon: Reno Yu MD;  Location: [facility];  Service: Pain Management;  Laterality: N/A;    ESOPHAGOGASTRODUODENOSCOPY N/A 11/11/2020    Procedure: EGD (ESOPHAGOGASTRODUODENOSCOPY);  Surgeon: Yunior Stevens MD;  Location: [facility];  Service: Endoscopy;  Laterality: N/A;    ESOPHAGOGASTRODUODENOSCOPY N/A 12/4/2023    Procedure: EGD (ESOPHAGOGASTRODUODENOSCOPY);  Surgeon: Mike Chambers MD;  Location: [facility];  Service: Gastroenterology;  Laterality: N/A;    INJECTION OF ANESTHETIC AGENT AROUND NERVE Bilateral 6/12/2024    Procedure: BLOCK, NERVE BILATERAL L3, 4, 5 MEDIAL BRANCH;  Surgeon: Mateus Vargas MD;  Location: [facility]IN [facility]GT;  Service: Pain Management;  Laterality: Bilateral;  [phone]    INJECTION OF FACET JOINT Bilateral 11/3/2021    Procedure: INJECTION, FACET JOINT BILATERAL L4/5, L5/S1;  Surgeon: Mateus Vargas MD;  Location: [facility];  Service: Pain Management;  Laterality: Bilateral;    TRANSFORAMINAL EPIDURAL INJECTION OF STEROID Bilateral 8/16/2022    Procedure: Injection,steroid,epidural,transforaminal approach L5/S1;  Surgeon: Reno Yu MD;  Location: [facility];  Service: Pain Management;  Laterality: Bilateral;    TRANSFORAMINAL EPIDURAL INJECTION OF STEROID Bilateral 7/7/2023     Procedure: Injection,steroid,epidural,transforaminal approach;  Surgeon: Reno Yu MD;  Location: [facility] OR;  Service: Pain Management;  Laterality: Bilateral;  L5/S1       Family History   Problem Relation Name Age of Onset    Alzheimer's disease Mother Mirela Bae     Early death Mother Mirela Bae     No Known Problems Father      Heart disease Maternal Grandfather Roge Do     Glaucoma Neg Hx      Macular degeneration Neg Hx      Retinal detachment Neg Hx         Social History     Socioeconomic History    Marital status:    Tobacco Use    Smoking status: Never    Smokeless tobacco: Never   Substance and Sexual Activity    Alcohol use: Not Currently     Alcohol/week: 10.0 standard drinks of alcohol     Types: 10 Glasses of wine per week     Comment: stopped in 2021, 4-6 times per week, 1/2 wine bottle per night    Drug use: Not Currently     Types: Amphetamines    Sexual activity: Yes     Partners: Female     Birth control/protection: None     Comment: partner has Mirena   Social History Narrative    January Barragan    Zia 16 mos    Works as .     Social Drivers of Health     Financial Resource Strain: Low Risk  (4/23/2024)    Overall Financial Resource Strain (CARDIA)     Difficulty of Paying Living Expenses: Not hard at all   Food Insecurity: No Food Insecurity (4/23/2024)    Hunger Vital Sign     Worried About Running Out of Food in the Last Year: Never true     Ran Out of Food in the Last Year: Never true   Transportation Needs: Unmet Transportation Needs (4/23/2024)    PRAPARE - Transportation     Lack of Transportation (Medical): Yes     Lack of Transportation (Non-Medical): No   Physical Activity: Sufficiently Active (4/23/2024)    Exercise Vital Sign     Days of Exercise per Week: 7 days     Minutes of Exercise per Session: 70 min   Stress: No Stress Concern Present (4/23/2024)    [employer] - Occupational Stress Questionnaire     Feeling of  "Stress : Only a little   Housing Stability: Low Risk  (2/4/2022)    Housing Stability Vital Sign     Unable to Pay for Housing in the Last Year: No     Number of Places Lived in the Last Year: 1     Unstable Housing in the Last Year: No       Review of patient's allergies indicates:  No Known Allergies    Review of Systems   Constitutional: Negative for decreased appetite, fever and malaise/fatigue.   Eyes:  Negative for blurred vision.   Cardiovascular:  Negative for chest pain, dyspnea on exertion, irregular heartbeat and leg swelling.   Respiratory:  Negative for cough, hemoptysis, shortness of breath and wheezing.    Endocrine: Negative for cold intolerance and heat intolerance.   Hematologic/Lymphatic: Negative for bleeding problem.   Musculoskeletal:  Negative for muscle weakness and myalgias.   Gastrointestinal:  Negative for abdominal pain, constipation and diarrhea.   Genitourinary:  Negative for bladder incontinence.   Neurological:  Negative for dizziness and weakness.   Psychiatric/Behavioral:  Negative for depression.         Objective:     Vitals:    02/03/25 1419   BP: 134/81   BP Location: Right arm   Patient Position: Sitting   Pulse: 82   Weight: 77.2 kg (170 lb 3.1 oz)   Height: 5' 9" (1.753 m)       BP Readings from Last 5 Encounters:   02/03/25 134/81   02/03/25 (!) 142/76   01/13/25 (!) 147/78   01/08/25 (!) 151/94   12/27/24 129/80        Physical Exam  Constitutional:       Appearance: He is well-developed.   HENT:      Head: Normocephalic and atraumatic.   Neck:      Vascular: No JVD.   Cardiovascular:      Rate and Rhythm: Normal rate and regular rhythm.      Heart sounds: Normal heart sounds. No murmur heard.     No friction rub. No gallop.   Pulmonary:      Effort: Pulmonary effort is normal. No respiratory distress.      Breath sounds: Normal breath sounds. No wheezing or rales.   Abdominal:      General: Bowel sounds are normal.      Palpations: Abdomen is soft.      Tenderness: There " is no abdominal tenderness. There is no guarding or rebound.   Musculoskeletal:      Cervical back: Normal range of motion and neck supple.   Skin:     General: Skin is warm and dry.   Neurological:      Mental Status: He is alert and oriented to person, place, and time.   Psychiatric:         Behavior: Behavior normal.             Current Outpatient Medications   Medication Instructions    ALPRAZolam (XANAX) 0.5 mg, Oral, Nightly PRN    buPROPion (WELLBUTRIN XL) 300 mg, Daily    losartan (COZAAR) 50 mg, Daily    rosuvastatin (CRESTOR) 20 mg, Daily    testosterone cypionate (DEPOTESTOTERONE CYPIONATE) 150 mg, Every 7 days       Lipid Panel:   Lab Results   Component Value Date    CHOL 189 12/05/2024    HDL 36 (L) 12/05/2024    LDLCALC 140.6 12/05/2024    TRIG 62 12/05/2024    CHOLHDL 19.0 (L) 12/05/2024         The 10-year ASCVD risk score (Sharron HUYNH, et al., 2019) is: 2.6%    Values used to calculate the score:      Age: 44 years      Sex: Male      Is Non- : No      Diabetic: No      Tobacco smoker: No      Systolic Blood Pressure: 134 mmHg      Is BP treated: Yes      HDL Cholesterol: 36 mg/dL      Total Cholesterol: 189 mg/dL    Most Recent EKG Results  Results for orders placed or performed in visit on 02/03/25   IN OFFICE EKG 12-LEAD (to [place])    Collection Time: 02/03/25 10:16 AM   Result Value Ref Range    QRS Duration 90 ms    [facility] QTC Calculation 371 ms    Narrative    Test Reason : R00.0,    Vent. Rate :  78 BPM     Atrial Rate :  78 BPM     P-R Int : 170 ms          QRS Dur :  90 ms      QT Int : 326 ms       P-R-T Axes :  77  72 -28 degrees    QTcB Int : 371 ms    Normal sinus rhythm  T wave abnormality, consider inferolateral ischemia  Abnormal ECG  When compared with ECG of 28-May-2021 13:52,  ST no longer elevated in Anterior-lateral leads  T wave inversion more evident in Inferior leads  T wave inversion now evident in Lateral leads  Confirmed by Scralet Funez ([id number]) on 2/3/2025  9:37:33 AM    Referred By: CHANEL FINNEGAN           Confirmed By: Scarlet Funez       Most Recent Echocardiogram Results  Results for orders placed during the hospital encounter of 02/08/22    Echo    Interpretation Summary  · Mild left atrial enlargement.  · The left ventricle is normal in size with normal systolic function.  · Normal right ventricular size with normal right ventricular systolic function.      Most Recent Nuclear Stress Test Results  No results found for this or any previous visit.      Most Recent Cardiac PET Stress Test Results  No results found for this or any previous visit.      Most Recent Cardiovascular Angiogram results  No results found for this or any previous visit.      Other Most Recent Cardiology Results  No results found for this or any previous visit.        All pertinent data including labs, imaging, EKGs, and studies listed above were reviewed.  Patient's most recent EKG tracing was personally interpreted by this provider.    Diagnoses:       1. Elevated coronary artery calcium score    2. Encounter for cardiac risk counseling    3. Palpitations    4. Atypical chest pain         Plan for treatment of the above diagnoses:     Assessment & Plan    Patient presents with elevated heart rate following discontinuation of gabapentin and tizanidine  Calcium score 263  Considered lifestyle modifications and further lipid testing to investigate root cause  Planned non-invasive testing to rule out immediate cardiac concerns  Ordered stress test and carotid artery ultrasound to assess cardiac function and potential arterial blockages  Additional lipid panel with lipoprotein A, apolipoprotein B, and high-sensitivity CRP to be conducted in 2 months     PLAN SUMMARY:  Continue baby aspirin   Continue Losartan 50mg PO Daily  Continue Crestor 20mg daily  Exercise stress echocardiogram ordered  Carotid Doppler ultrasound ordered  Lipid panel ordered (in 2 months)  Apolipoprotein B test  ordered  Lipoprotein A test ordered  High-sensitivity CRP test ordered  Transition from keto diet to Mediterranean-style eating pattern  Reduce red meat consumption    PLAN NOTE:  Explained calcium score as an indicator of plaque buildup in arteries.  Discussed potential outcomes of stress test and angiogram procedure if needed.  Educated on the stabilizing effects of cholesterol medication on vulnerable plaques  Provided information on Mediterranean diet benefits for heart health.  Mr. Bae to transition from keto diet to Mediterranean-style eating pattern.  Replace better with extra virgin olive oil.  Recommend reducing red meat consumption  Mr. Bae to choose fish options when dining out  Recommend incorporating more relaxed, sit-down meals when possible.  Mr. Bae to continue current exercise routine as long as stress test without acute abnormalities  Recommend practicing stress reduction techniques when possible.  Continued Crestor 20mg daily.  Continued Losartan 50 mg PO Daily  Continued baby aspirin daily.  Exercise stress echocardiogram ordered.  Carotid Doppler ultrasound ordered.  Lipid panel ordered (to be done in 2 months).  Lipoprotein A test ordered.  Apolipoprotein B test ordered.  High-sensitivity CRP test ordered.  Follow up for lipid panel and additional labs in 2 months.  Contact the office immediately if experiencing chest pressure, dullness, or aching, especially if radiating to arm or jaw         Exercise stress echocardiogram   Carotid Doppler   Lipoprotein A, high sensitivity CRP, apolipoprotein B at time of next lipid panel   Continue rosuvastatin 20 mg PO Daily    Lipid panel in 2-3 months   Today's blood work pending, will reach out with results     Continue other cardiac medications  Mediterranean Diet/Cardiovascular Exercise Program    Visit today included increased complexity associated with the care of the episodic problem(s) addressed above in addition to managing the  longitudinal care of the patient due to the serious and/or complex managed problem(s) listed above.    Parts of this note were transcribed using voice recognition and generative artificial intelligence software ([employer]).  Please excuse any grammatical or syntax errors and reach out to me with any questions or clarifications needed.    Patient queried and all questions were answered.    F/u in 1 year to reassess      Signed:    Luis Ruano MD  2/3/2025 1:56 PM

## 2025-02-03 NOTE — TELEPHONE ENCOUNTER
Had a conversation with Mr. Bae about his test results / MARILU score.  He is understandably very concerned but I explained that this is just a number and we just need to follow the process.  His wife is a physician and has already reached out to Cardiology and they are working with him now also.  In addition we discussed the pulmonary nodule discovered incidentally on the CT and I explained that its likely nothing but that we will likely follow up in a year with a repeat CT.  Patient is encouraged to keep taking his medications and follow the process.

## 2025-02-03 NOTE — PROGRESS NOTES
Name: Jorge Bae III  MRN: [id number]  : 1980  PCP: Rakan Hu MD    Subjective   Jorge Bae III is here today to discuss his cholesterol and blood pressure.  Patient is  of [place].  He states he works out a lot and was started on Crestor 20 mg daily and Losartan 50 mg.  He is taking it once a day in the morning.  He has been having some tachycardia.  He had this previously during COVID and it resolved.  He has had bad lower back pain and has been taking steroids for generalized over all back pain.  He has multiple evaluations and they demonstrate multiple chronic pathologies.   He been getting physical therapy multiple times a day, eating clean but not sleeping well.    Review of Systems   Constitutional: Negative.    HENT: Negative.     Eyes: Negative.    Respiratory: Negative.     Cardiovascular: Negative.    Gastrointestinal: Negative.    Endocrine: Negative.    Genitourinary: Negative.    Musculoskeletal: Negative.    Skin: Negative.    Allergic/Immunologic: Negative.    Neurological: Negative.    Hematological: Negative.    Psychiatric/Behavioral: Negative.     All other systems reviewed and are negative.      Patient Active Problem List   Diagnosis    Testosterone deficiency    Cervical radiculopathy       There are no preventive care reminders to display for this patient.      Objective   Vitals:    25 0829   BP: (!) 142/76   Pulse: 75       Physical Exam  Vitals and nursing note reviewed.   Constitutional:       General: He is not in acute distress.     Appearance: Normal appearance. He is well-developed.   HENT:      Head: Normocephalic and atraumatic.      Right Ear: External ear normal.      Left Ear: External ear normal.      Mouth/Throat:      Mouth: Mucous membranes are moist.   Eyes:      General: Lids are normal. Gaze aligned appropriately.      Extraocular Movements: Extraocular movements intact.      Conjunctiva/sclera: Conjunctivae normal.       Pupils: Pupils are equal, round, and reactive to light.   Cardiovascular:      Rate and Rhythm: Normal rate and regular rhythm.      Heart sounds: No murmur heard.     No friction rub. No gallop.   Pulmonary:      Effort: Pulmonary effort is normal. No respiratory distress.      Breath sounds: Normal breath sounds and air entry. No wheezing, rhonchi or rales.   Abdominal:      General: Abdomen is flat. There is no distension.   Musculoskeletal:         General: No swelling or deformity.      Cervical back: Full passive range of motion without pain, normal range of motion and neck supple.      Right lower leg: No edema.      Left lower leg: No edema.   Skin:     General: Skin is warm and dry.      Coloration: Skin is not jaundiced.   Neurological:      General: No focal deficit present.      Mental Status: He is alert and oriented to person, place, and time. Mental status is at baseline.      GCS: GCS eye subscore is 4. GCS verbal subscore is 5. GCS motor subscore is 6.   Psychiatric:         Attention and Perception: Attention and perception normal.         Mood and Affect: Mood normal.         Speech: Speech normal.         Behavior: Behavior normal. Behavior is cooperative.         Thought Content: Thought content normal.         Cognition and Memory: Cognition normal.         Judgment: Judgment normal.         Assessment & Plan   1. Tachycardia  -     IN OFFICE EKG 12-LEAD (to [facility])  -     D-DIMER, QUANTITATIVE; Future; Expected date: 02/03/2025  Patients tachycardia is not noted today.  Patient relates that he has been having this intermittently for at least the last couple of weeks. He did have some air travel in that time and does admit to some intermittent shortness of breath at times.  Patient is in very good shape, works out and is currently undergoing physical therapy daily.  Although I feel that his risk is low for a Pulmonary Embolism there is some value in checking a D dimer and possible a troponin.   His EKG today was relatively normal.    EKG interpretation: Normal Sinus rhythm at a ventricular rate of 78 with non specific changes noted / T wave inversions.  This is a change when compared to previous studies.      The 10-year ASCVD risk score (Sharron HUYNH, et al., 2019) is: 2.8%    Values used to calculate the score:      Age: 44 years      Sex: Male      Is Non- : No      Diabetic: No      Tobacco smoker: No      Systolic Blood Pressure: 142 mmHg      Is BP treated: Yes      HDL Cholesterol: 36 mg/dL      Total Cholesterol: 189 mg/dL      Of note patient had an echo 2 years ago that was grossly normal.  We will see him back in 2 weeks to re evaluate.    2. Primary hypertension    Patient was started on Losartan 50 mg two days ago along with Crestor.  Initially patients blood pressure was elevated but a recheck demonstrated some reduction during his course in the clinic.  We again will re evaluate.    3. Other insomnia  -     ALPRAZolam (XANAX) 0.5 MG tablet; Take 1 tablet (0.5 mg total) by mouth nightly as needed for Anxiety.  Dispense: 30 tablet; Refill: 0  Patient is suffering some anxiety and difficulty sleeping.  We have prescribed a short course of Xanax in the hope that the patient can use it to sleep.  He has used it for this in the past at times with good benefit.    Our hope is that Eber's blood pressure stabilizes, his Troponin and D dimer are normal and we can help reduce his anxiety about his health.  Patient is aware that this is at least partially responsible for his symptoms but we will re evaluate in a couple of weeks or sooner if he needs.  Patient appears somewhat reassured at end of visit.      Follow up  2 weeks and as needed    Patient is encouraged to contact me at anytime via [employer] rima or my office for any needs.    Rakan Hu MD  02/03/2025    DISCLAIMER: This note was prepared with "[employer] Direct voice recognition transcription software. Chuy  syntax, mangled pronouns, and other bizarre constructions may be attributed to that software system.  I attest to having reviewed and edited the generated note for accuracy, though some syntax or spelling errors may persist. Please contact the author of this note for any clarification.

## 2025-02-03 NOTE — PROGRESS NOTES
Patient with recent abnormal EKG and symptomatic palpitations    Order echocardiogram and basic blood work including electrolytes and thyroid  Has calcium score pending today    Signed:    Luis Ruano MD  Cardiology  2/3/2025 10:19 AM

## 2025-02-03 NOTE — PATIENT INSTRUCTIONS
Thank you for coming into see us today.  We are very committed to helping you achieve your best you.  If you have any questions or anything to add remember you can reach me via [facility] at any time.      Rakan Hu M.D.  [facility]  [facility] [place]    If you are happy with your care please consider adding a review at;  Dr. Rakan Hu MD - Family Medicine Physician in [facility]

## 2025-02-04 ENCOUNTER — HOSPITAL ENCOUNTER (OUTPATIENT)
Dept: CARDIOLOGY | Facility: HOSPITAL | Age: 45
Discharge: HOME | End: 2025-02-04
Attending: INTERNAL MEDICINE
Payer: COMMERCIAL

## 2025-02-04 VITALS — HEIGHT: 69 IN | BODY MASS INDEX: 25.48 KG/M2 | WEIGHT: 172 LBS

## 2025-02-04 DIAGNOSIS — R93.1 ELEVATED CORONARY ARTERY CALCIUM SCORE: ICD-10-CM

## 2025-02-04 DIAGNOSIS — R07.89 ATYPICAL CHEST PAIN: ICD-10-CM

## 2025-02-04 LAB
ASCENDING AORTA: 2.7 CM
AV INDEX (PROSTH): 0.94
AV MEAN GRADIENT: 5 MMHG
AV PEAK GRADIENT: 12 MMHG
AV VALVE AREA BY VELOCITY RATIO: 2.9 CM²
AV VALVE AREA: 3.3 CM²
AV VELOCITY RATIO: 0.82
BSA FOR ECHO PROCEDURE: 1.95 M2
CV ECHO LV RWT: 0.44 CM
CV STRESS BASE HR: 91 BPM
DIASTOLIC BLOOD PRESSURE: 82 MMHG
DOP CALC AO PEAK VEL: 1.7 M/S
DOP CALC AO VTI: 26.6 CM
DOP CALC LVOT AREA: 3.5 CM2
DOP CALC LVOT DIAMETER: 2.1 CM
DOP CALC LVOT PEAK VEL: 1.4 M/S
DOP CALC LVOT STROKE VOLUME: 86.5 CM3
DOP CALCLVOT PEAK VEL VTI: 25 CM
E WAVE DECELERATION TIME: 129 MSEC
E/A RATIO: 1.2
E/E' RATIO: 4 M/S
ECHO LV POSTERIOR WALL: 1 CM (ref 0.6–1.1)
EJECTION FRACTION: 65 %
FRACTIONAL SHORTENING: 31.1 % (ref 28–44)
INTERVENTRICULAR SEPTUM: 1 CM (ref 0.6–1.1)
LEFT ARM DIASTOLIC BLOOD PRESSURE: 81 MMHG
LEFT ARM SYSTOLIC BLOOD PRESSURE: 134 MMHG
LEFT ATRIUM AREA SYSTOLIC (APICAL 2 CHAMBER): 16.77 CM2
LEFT ATRIUM AREA SYSTOLIC (APICAL 4 CHAMBER): 12.88 CM2
LEFT ATRIUM SIZE: 3.4 CM
LEFT ATRIUM VOLUME INDEX MOD: 21 ML/M2
LEFT ATRIUM VOLUME MOD: 41 ML
LEFT CBA DIAS: 23 CM/S
LEFT CBA SYS: 133 CM/S
LEFT CCA DIST DIAS: 22 CM/S
LEFT CCA DIST SYS: 129 CM/S
LEFT CCA MID DIAS: 22 CM/S
LEFT CCA MID SYS: 151 CM/S
LEFT CCA PROX DIAS: 24 CM/S
LEFT CCA PROX SYS: 162 CM/S
LEFT ECA DIAS: 25 CM/S
LEFT ECA SYS: 134 CM/S
LEFT ICA DIST DIAS: 31 CM/S
LEFT ICA DIST SYS: 87 CM/S
LEFT ICA MID DIAS: 30 CM/S
LEFT ICA MID SYS: 81 CM/S
LEFT ICA PROX DIAS: 26 CM/S
LEFT ICA PROX SYS: 81 CM/S
LEFT INTERNAL DIMENSION IN SYSTOLE: 3.1 CM (ref 2.1–4)
LEFT VENTRICLE DIASTOLIC VOLUME INDEX: 48.73 ML/M2
LEFT VENTRICLE DIASTOLIC VOLUME: 94.53 ML
LEFT VENTRICLE END SYSTOLIC VOLUME APICAL 2 CHAMBER: 47.2 ML
LEFT VENTRICLE END SYSTOLIC VOLUME APICAL 4 CHAMBER: 31.68 ML
LEFT VENTRICLE MASS INDEX: 79 G/M2
LEFT VENTRICLE SYSTOLIC VOLUME INDEX: 19.8 ML/M2
LEFT VENTRICLE SYSTOLIC VOLUME: 38.45 ML
LEFT VENTRICULAR INTERNAL DIMENSION IN DIASTOLE: 4.5 CM (ref 3.5–6)
LEFT VENTRICULAR MASS: 153.3 G
LEFT VERTEBRAL DIAS: 16 CM/S
LEFT VERTEBRAL SYS: 108 CM/S
LV LATERAL E/E' RATIO: 3.2 M/S
LV SEPTAL E/E' RATIO: 4.4 M/S
LVED V (TEICH): 94.53 ML
LVES V (TEICH): 38.45 ML
LVOT MG: 4.28 MMHG
LVOT MV: 0.96 CM/S
MV PEAK A VEL: 0.59 M/S
MV PEAK E VEL: 0.71 M/S
MV STENOSIS PRESSURE HALF TIME: 37.37 MS
MV VALVE AREA P 1/2 METHOD: 5.89 CM2
OHS CV CAROTID RIGHT ICA EDV HIGHEST: 29
OHS CV CAROTID ULTRASOUND LEFT ICA/CCA RATIO: 0.67
OHS CV CAROTID ULTRASOUND RIGHT ICA/CCA RATIO: 0.65
OHS CV CPX 1 MINUTE RECOVERY HEART RATE: 153 BPM
OHS CV CPX 85 PERCENT MAX PREDICTED HEART RATE MALE: 151
OHS CV CPX ESTIMATED METS: 19
OHS CV CPX MAX PREDICTED HEART RATE: 178
OHS CV CPX PATIENT IS FEMALE: 0
OHS CV CPX PATIENT IS MALE: 1
OHS CV CPX PEAK DIASTOLIC BLOOD PRESSURE: 80 MMHG
OHS CV CPX PEAK HEAR RATE: 176 BPM
OHS CV CPX PEAK RATE PRESSURE PRODUCT: NORMAL
OHS CV CPX PEAK SYSTOLIC BLOOD PRESSURE: 202 MMHG
OHS CV CPX PERCENT MAX PREDICTED HEART RATE ACHIEVED: 99
OHS CV CPX RATE PRESSURE PRODUCT PRESENTING: NORMAL
OHS CV PV CAROTID LEFT HIGHEST CCA: 162
OHS CV PV CAROTID LEFT HIGHEST ICA: 87
OHS CV PV CAROTID RIGHT HIGHEST CCA: 172
OHS CV PV CAROTID RIGHT HIGHEST ICA: 90
OHS CV RV/LV RATIO: 0.82 CM
OHS CV US CAROTID LEFT HIGHEST EDV: 31
RA PRESSURE ESTIMATED: 3 MMHG
RA VOL SYS: 21.08 ML
RIGHT ARM DIASTOLIC BLOOD PRESSURE: 81 MMHG
RIGHT ARM SYSTOLIC BLOOD PRESSURE: 134 MMHG
RIGHT ATRIAL AREA: 10.6 CM2
RIGHT ATRIUM VOLUME AREA LENGTH APICAL 4 CHAMBER: 19.49 ML
RIGHT CBA DIAS: 24 CM/S
RIGHT CBA SYS: 133 CM/S
RIGHT CCA DIST DIAS: 24 CM/S
RIGHT CCA DIST SYS: 139 CM/S
RIGHT CCA MID DIAS: 22 CM/S
RIGHT CCA MID SYS: 149 CM/S
RIGHT CCA PROX DIAS: 24 CM/S
RIGHT CCA PROX SYS: 172 CM/S
RIGHT ECA DIAS: 25 CM/S
RIGHT ECA SYS: 196 CM/S
RIGHT ICA DIST DIAS: 29 CM/S
RIGHT ICA DIST SYS: 89 CM/S
RIGHT ICA MID DIAS: 29 CM/S
RIGHT ICA MID SYS: 90 CM/S
RIGHT ICA PROX DIAS: 18 CM/S
RIGHT ICA PROX SYS: 60 CM/S
RIGHT VENTRICLE DIASTOLIC BASEL DIMENSION: 3.7 CM
RIGHT VENTRICLE DIASTOLIC LENGTH: 8.5 CM
RIGHT VENTRICLE DIASTOLIC MID DIMENSION: 2.8 CM
RIGHT VENTRICULAR END-DIASTOLIC DIMENSION: 3.66 CM
RIGHT VENTRICULAR LENGTH IN DIASTOLE (APICAL 4-CHAMBER VIEW): 8.48 CM
RIGHT VERTEBRAL DIAS: 17 CM/S
RIGHT VERTEBRAL SYS: 73 CM/S
RV MID DIAMA: 2.82 CM
RV TISSUE DOPPLER FREE WALL SYSTOLIC VELOCITY 1 (APICAL 4 CHAMBER VIEW): 18.91 CM/S
SINUS: 3.19 CM
STJ: 2.71 CM
STRESS ECHO POST EXERCISE DUR MIN: 10 MINUTES
STRESS ECHO POST EXERCISE DUR SEC: 38 SECONDS
SYSTOLIC BLOOD PRESSURE: 147 MMHG
TDI LATERAL: 0.22 M/S
TDI SEPTAL: 0.16 M/S
TDI: 0.19 M/S
TRICUSPID ANNULAR PLANE SYSTOLIC EXCURSION: 2.43 CM
Z-SCORE OF LEFT VENTRICULAR DIMENSION IN END DIASTOLE: -2.03
Z-SCORE OF LEFT VENTRICULAR DIMENSION IN END SYSTOLE: -0.71

## 2025-02-04 PROCEDURE — 93320 DOPPLER ECHO COMPLETE: CPT | Mod: 26,,, | Performed by: INTERNAL MEDICINE

## 2025-02-04 PROCEDURE — 93325 DOPPLER ECHO COLOR FLOW MAPG: CPT | Mod: 26,,, | Performed by: INTERNAL MEDICINE

## 2025-02-04 PROCEDURE — 93880 EXTRACRANIAL BILAT STUDY: CPT | Mod: 26,,, | Performed by: INTERNAL MEDICINE

## 2025-02-04 PROCEDURE — 93880 EXTRACRANIAL BILAT STUDY: CPT | Mod: PO

## 2025-02-04 PROCEDURE — 93351 STRESS TTE COMPLETE: CPT | Mod: 26,,, | Performed by: INTERNAL MEDICINE

## 2025-02-04 PROCEDURE — 93325 DOPPLER ECHO COLOR FLOW MAPG: CPT | Mod: PO

## 2025-02-05 ENCOUNTER — PATIENT MESSAGE (OUTPATIENT)
Dept: CARDIOLOGY | Facility: CLINIC | Age: 45
End: 2025-02-05
Payer: COMMERCIAL

## 2025-02-13 ENCOUNTER — PATIENT MESSAGE (OUTPATIENT)
Dept: FAMILY MEDICINE | Facility: CLINIC | Age: 45
End: 2025-02-13
Payer: COMMERCIAL

## 2025-02-18 ENCOUNTER — OFFICE VISIT (OUTPATIENT)
Dept: FAMILY MEDICINE | Facility: CLINIC | Age: 45
End: 2025-02-18
Payer: COMMERCIAL

## 2025-02-18 ENCOUNTER — LAB VISIT (OUTPATIENT)
Dept: LAB | Facility: HOSPITAL | Age: 45
End: 2025-02-18
Attending: EMERGENCY MEDICINE
Payer: COMMERCIAL

## 2025-02-18 VITALS
WEIGHT: 166.44 LBS | HEART RATE: 65 BPM | HEIGHT: 69 IN | DIASTOLIC BLOOD PRESSURE: 64 MMHG | BODY MASS INDEX: 24.65 KG/M2 | OXYGEN SATURATION: 98 % | SYSTOLIC BLOOD PRESSURE: 120 MMHG

## 2025-02-18 DIAGNOSIS — R00.2 PALPITATIONS: ICD-10-CM

## 2025-02-18 DIAGNOSIS — R00.2 PALPITATIONS: Primary | ICD-10-CM

## 2025-02-18 LAB — TSH SERPL DL<=0.005 MIU/L-ACNC: 2.47 UIU/ML (ref 0.4–4)

## 2025-02-18 PROCEDURE — 36415 COLL VENOUS BLD VENIPUNCTURE: CPT | Mod: PO | Performed by: EMERGENCY MEDICINE

## 2025-02-18 PROCEDURE — 84443 ASSAY THYROID STIM HORMONE: CPT | Performed by: EMERGENCY MEDICINE

## 2025-02-18 RX ORDER — PROPRANOLOL HYDROCHLORIDE 10 MG/1
10 TABLET ORAL EVERY 6 HOURS PRN
COMMUNITY
Start: 2025-02-05 | End: 2025-02-18

## 2025-02-18 RX ORDER — METOPROLOL TARTRATE 25 MG/1
25 TABLET, FILM COATED ORAL 2 TIMES DAILY
COMMUNITY
Start: 2025-02-14

## 2025-02-18 NOTE — PATIENT INSTRUCTIONS
Thank you for coming into see us today.  We are very committed to helping you achieve your best YOU.  If you have any questions or anything to add remember you can reach me via [facility] at any time.      Rakan Hu M.D.  [facility]  [facility] / [place]    If you are happy with your care please consider adding a review at;  Dr. Rakan Hu MD - Family Medicine Physician in [facility]

## 2025-02-18 NOTE — PROGRESS NOTES
"Name: Jorge Bae III  MRN: [id number]  : 1980  PCP: Rakan Hu MD    Subjective   Jorge Bae III is here today for follow up.  He had an elevated Cardiac calcium score which was very concerning.  He was subsequently cleared by Cardiology.  He has since continued to have some "pounding" since that time.  He has had a trial of Propanolol which did not seem to help.  He is now on Metoprolol and if he goes off that he has pounding heart but otherwise appears to be better.      Review of Systems   Constitutional: Negative.    HENT: Negative.     Eyes: Negative.    Respiratory: Negative.     Cardiovascular:  Positive for palpitations.   Gastrointestinal: Negative.    Endocrine: Negative.    Genitourinary: Negative.    Musculoskeletal: Negative.    Skin: Negative.    Allergic/Immunologic: Negative.    Neurological: Negative.    Hematological: Negative.    Psychiatric/Behavioral: Negative.     All other systems reviewed and are negative.      Problem List[1]    There are no preventive care reminders to display for this patient.    Objective   Vitals:    25 1008   BP: 120/64   Pulse: 65       Physical Exam  Vitals and nursing note reviewed.   Constitutional:       General: He is not in acute distress.     Appearance: Normal appearance. He is well-developed.   HENT:      Head: Normocephalic and atraumatic.      Right Ear: External ear normal.      Left Ear: External ear normal.      Mouth/Throat:      Mouth: Mucous membranes are moist.   Eyes:      General: Lids are normal. Gaze aligned appropriately.      Extraocular Movements: Extraocular movements intact.      Conjunctiva/sclera: Conjunctivae normal.      Pupils: Pupils are equal, round, and reactive to light.   Cardiovascular:      Rate and Rhythm: Normal rate and regular rhythm.      Heart sounds: No murmur heard.     No friction rub. No gallop.   Pulmonary:      Effort: Pulmonary effort is normal. No respiratory distress.      Breath " sounds: Normal breath sounds and air entry. No wheezing, rhonchi or rales.   Abdominal:      General: Abdomen is flat. There is no distension.   Musculoskeletal:         General: No swelling or deformity.      Cervical back: Full passive range of motion without pain, normal range of motion and neck supple.      Right lower leg: No edema.      Left lower leg: No edema.   Skin:     General: Skin is warm and dry.      Coloration: Skin is not jaundiced.   Neurological:      General: No focal deficit present.      Mental Status: He is alert and oriented to person, place, and time. Mental status is at baseline.      GCS: GCS eye subscore is 4. GCS verbal subscore is 5. GCS motor subscore is 6.   Psychiatric:         Attention and Perception: Attention and perception normal.         Mood and Affect: Mood normal.         Speech: Speech normal.         Behavior: Behavior normal. Behavior is cooperative.         Thought Content: Thought content normal.         Cognition and Memory: Cognition normal.         Judgment: Judgment normal.         Assessment & Plan   1. Palpitations  -     Holter monitor - 48 hour; Future  -     TSH; Future; Expected date: 02/18/2025    Patient is doing well on Metoprolol but continues to have some concerns.  We will order a Holter monitor, rule out any aberrant arrhythmias and also a TSH.  Patient does relate some correlation with a series of steroids that he had for some back problems and there is a definite possibility that the patient is experiencing some adverse reactions from this.  I have asked the patient to talk to his Psychiatrist as certainly there is some component of anxiety in this.  In addition the patient relates no problems with exercise or intolerance although of course he has some limited heart rate due to the beta blocker he is taking.  We will have the patient wear a Holter monitor, off the metoprolol and on it for a period so we can record hopefully what the patient is  experiencing.  The patient is very concerned still at his abnormal Calcium Score but we spent a fair amount of time reassuring the patient as he had a normal stress / echo.  His vitals today are excellent and I am sure we will get to the bottom of the patients symptoms when we can rule out a few more variables.    Follow up  2 weeks after Holter complete.    Patient is encouraged to contact me at anytime via [facility] or my office for any needs.    Rakan Hu MD  02/18/2025    DISCLAIMER: This note was prepared with [employer] Direct voice recognition transcription software. Garbled syntax, mangled pronouns, and other bizarre constructions may be attributed to that software system.  I attest to having reviewed and edited the generated note for accuracy, though some syntax or spelling errors may persist. Please contact the author of this note for any clarification.         [1]   Patient Active Problem List  Diagnosis    Testosterone deficiency    Cervical radiculopathy    Elevated coronary artery calcium score

## 2025-02-19 ENCOUNTER — RESULTS FOLLOW-UP (OUTPATIENT)
Dept: FAMILY MEDICINE | Facility: CLINIC | Age: 45
End: 2025-02-19

## 2025-03-11 ENCOUNTER — TELEPHONE (OUTPATIENT)
Dept: FAMILY MEDICINE | Facility: CLINIC | Age: 45
End: 2025-03-11
Payer: COMMERCIAL

## 2025-03-11 DIAGNOSIS — R00.2 PALPITATIONS: Primary | ICD-10-CM

## 2025-03-11 NOTE — TELEPHONE ENCOUNTER
----- Message from Rakan Hu MD sent at 3/11/2025  7:31 AM CDT -----  Can you please make sure that this patients Holter monitor order is processed today and give them a call

## 2025-03-14 DIAGNOSIS — Z13.6 ENCOUNTER FOR SCREENING FOR CARDIOVASCULAR DISORDERS: ICD-10-CM

## 2025-03-14 DIAGNOSIS — R07.89 ATYPICAL CHEST PAIN: Primary | ICD-10-CM

## 2025-03-14 DIAGNOSIS — R00.2 PALPITATIONS: ICD-10-CM

## 2025-03-14 DIAGNOSIS — Z71.89 ENCOUNTER FOR CARDIAC RISK COUNSELING: ICD-10-CM

## 2025-03-18 ENCOUNTER — HOSPITAL ENCOUNTER (OUTPATIENT)
Dept: CARDIOLOGY | Facility: HOSPITAL | Age: 45
Discharge: HOME | End: 2025-03-18
Attending: EMERGENCY MEDICINE
Payer: COMMERCIAL

## 2025-03-18 DIAGNOSIS — R00.2 PALPITATIONS: ICD-10-CM

## 2025-03-18 PROCEDURE — 93225 XTRNL ECG REC<48 HRS REC: CPT | Mod: PO

## 2025-03-19 ENCOUNTER — LAB VISIT (OUTPATIENT)
Dept: LAB | Facility: HOSPITAL | Age: 45
End: 2025-03-19
Attending: INTERNAL MEDICINE
Payer: COMMERCIAL

## 2025-03-19 DIAGNOSIS — Z71.89 ENCOUNTER FOR CARDIAC RISK COUNSELING: ICD-10-CM

## 2025-03-19 DIAGNOSIS — R00.2 PALPITATIONS: ICD-10-CM

## 2025-03-19 DIAGNOSIS — R07.89 ATYPICAL CHEST PAIN: ICD-10-CM

## 2025-03-19 DIAGNOSIS — Z13.6 ENCOUNTER FOR SCREENING FOR CARDIOVASCULAR DISORDERS: ICD-10-CM

## 2025-03-19 LAB
CRP SERPL-MCNC: 1.5 MG/L (ref 0–8.2)
ERYTHROCYTE [SEDIMENTATION RATE] IN BLOOD BY PHOTOMETRIC METHOD: 3 MM/HR (ref 0–23)

## 2025-03-19 PROCEDURE — 85651 RBC SED RATE NONAUTOMATED: CPT | Mod: PO | Performed by: INTERNAL MEDICINE

## 2025-03-19 PROCEDURE — 86140 C-REACTIVE PROTEIN: CPT | Performed by: INTERNAL MEDICINE

## 2025-03-19 PROCEDURE — 85652 RBC SED RATE AUTOMATED: CPT | Performed by: INTERNAL MEDICINE

## 2025-03-19 PROCEDURE — 84270 ASSAY OF SEX HORMONE GLOBUL: CPT | Performed by: INTERNAL MEDICINE

## 2025-03-26 LAB
ALBUMIN SERPL-MCNC: 5.1 G/DL (ref 3.6–5.1)
SHBG SERPL-SCNC: 22 NMOL/L (ref 10–50)
TESTOST FREE SERPL-MCNC: 220 PG/ML (ref 46–224)
TESTOST SERPL-MCNC: 1059 NG/DL (ref 250–1100)
TESTOSTERONE.FREE+WB SERPL-MCNC: 509.9 NG/DL

## 2025-03-28 ENCOUNTER — RESULTS FOLLOW-UP (OUTPATIENT)
Dept: CARDIOLOGY | Facility: CLINIC | Age: 45
End: 2025-03-28

## 2025-03-29 ENCOUNTER — PATIENT MESSAGE (OUTPATIENT)
Dept: FAMILY MEDICINE | Facility: CLINIC | Age: 45
End: 2025-03-29
Payer: COMMERCIAL

## 2025-03-31 ENCOUNTER — TELEPHONE (OUTPATIENT)
Dept: FAMILY MEDICINE | Facility: CLINIC | Age: 45
End: 2025-03-31
Payer: COMMERCIAL

## 2025-03-31 DIAGNOSIS — R00.2 PALPITATIONS: Primary | ICD-10-CM

## 2025-03-31 NOTE — TELEPHONE ENCOUNTER
----- Message from Rod sent at 3/31/2025 11:27 AM CDT -----  Contact: Pt [phone]  Type:  Needs Medical AdviceWho Called: PtWould the patient rather a call back or a response via [facility]? CallBest Call Back Number: [phone] Additional Information: Pt is calling in ref to CT angiogram that was to be ordered. Pls call back and adv. Thank you.

## 2025-04-03 ENCOUNTER — LAB VISIT (OUTPATIENT)
Dept: LAB | Facility: HOSPITAL | Age: 45
End: 2025-04-03
Attending: INTERNAL MEDICINE
Payer: COMMERCIAL

## 2025-04-03 ENCOUNTER — RESULTS FOLLOW-UP (OUTPATIENT)
Dept: FAMILY MEDICINE | Facility: CLINIC | Age: 45
End: 2025-04-03

## 2025-04-03 DIAGNOSIS — R07.89 ATYPICAL CHEST PAIN: ICD-10-CM

## 2025-04-03 DIAGNOSIS — I10 PRIMARY HYPERTENSION: Primary | ICD-10-CM

## 2025-04-03 DIAGNOSIS — I10 PRIMARY HYPERTENSION: ICD-10-CM

## 2025-04-03 DIAGNOSIS — R93.1 ELEVATED CORONARY ARTERY CALCIUM SCORE: ICD-10-CM

## 2025-04-03 LAB
ANION GAP (OHS): 13 MMOL/L (ref 8–16)
BUN SERPL-MCNC: 11 MG/DL (ref 6–20)
CALCIUM SERPL-MCNC: 9.4 MG/DL (ref 8.7–10.5)
CHLORIDE SERPL-SCNC: 99 MMOL/L (ref 95–110)
CHOLEST SERPL-MCNC: 142 MG/DL (ref 120–199)
CHOLEST/HDLC SERPL: 3.3 {RATIO} (ref 2–5)
CO2 SERPL-SCNC: 24 MMOL/L (ref 23–29)
CREAT SERPL-MCNC: 1 MG/DL (ref 0.5–1.4)
CRP SERPL-MCNC: 0.8 MG/L
GFR SERPLBLD CREATININE-BSD FMLA CKD-EPI: >60 ML/MIN/1.73/M2
GLUCOSE SERPL-MCNC: 79 MG/DL (ref 70–110)
HDLC SERPL-MCNC: 43 MG/DL (ref 40–75)
HDLC SERPL: 30.3 % (ref 20–50)
LDLC SERPL CALC-MCNC: 83.8 MG/DL (ref 63–159)
NONHDLC SERPL-MCNC: 99 MG/DL
POTASSIUM SERPL-SCNC: 4.1 MMOL/L (ref 3.5–5.1)
SODIUM SERPL-SCNC: 136 MMOL/L (ref 136–145)
TRIGL SERPL-MCNC: 76 MG/DL (ref 30–150)

## 2025-04-03 PROCEDURE — 80061 LIPID PANEL: CPT

## 2025-04-03 PROCEDURE — 83695 ASSAY OF LIPOPROTEIN(A): CPT

## 2025-04-03 PROCEDURE — 36415 COLL VENOUS BLD VENIPUNCTURE: CPT | Mod: PO

## 2025-04-03 PROCEDURE — 86141 C-REACTIVE PROTEIN HS: CPT

## 2025-04-03 PROCEDURE — 82310 ASSAY OF CALCIUM: CPT | Mod: PO

## 2025-04-03 PROCEDURE — 82172 ASSAY OF APOLIPOPROTEIN: CPT

## 2025-04-04 ENCOUNTER — TELEPHONE (OUTPATIENT)
Dept: CARDIOLOGY | Facility: CLINIC | Age: 45
End: 2025-04-04
Payer: COMMERCIAL

## 2025-04-04 ENCOUNTER — PATIENT MESSAGE (OUTPATIENT)
Dept: CARDIOLOGY | Facility: CLINIC | Age: 45
End: 2025-04-04
Payer: COMMERCIAL

## 2025-04-04 ENCOUNTER — RESULTS FOLLOW-UP (OUTPATIENT)
Dept: CARDIOLOGY | Facility: CLINIC | Age: 45
End: 2025-04-04

## 2025-04-04 ENCOUNTER — RESULTS FOLLOW-UP (OUTPATIENT)
Dept: FAMILY MEDICINE | Facility: CLINIC | Age: 45
End: 2025-04-04

## 2025-04-04 DIAGNOSIS — I10 HYPERTENSION, UNSPECIFIED TYPE: ICD-10-CM

## 2025-04-04 DIAGNOSIS — R00.2 PALPITATIONS: Primary | ICD-10-CM

## 2025-04-04 DIAGNOSIS — R07.89 ATYPICAL CHEST PAIN: ICD-10-CM

## 2025-04-04 DIAGNOSIS — R93.1 ELEVATED CORONARY ARTERY CALCIUM SCORE: Primary | ICD-10-CM

## 2025-04-04 RX ORDER — ROSUVASTATIN CALCIUM 40 MG/1
40 TABLET, COATED ORAL DAILY
Qty: 90 TABLET | Refills: 3 | Status: SHIPPED | OUTPATIENT
Start: 2025-04-04 | End: 2026-04-04

## 2025-04-04 NOTE — TELEPHONE ENCOUNTER
Recent lipid panel and CCTA reviewed    Will increase rosuvastatin to 40mf by mouth daily  Will repeat lipid panel in 3 months    Signed:    Luis Ruano MD  Cardiology  4/4/2025 4:42 PM

## 2025-04-04 NOTE — TELEPHONE ENCOUNTER
Patient still with episodes of tachycardia, uncontrolled hypertension, and flushing and sweating. Will initiate further workup including    UA  Aldosterone/renin ratio  PRA  Plasma metanephrines  AM cortisol  Aldosterone  Renal artery doppler    Signed:    Luis Ruano MD  Cardiology  4/4/2025 9:59 AM

## 2025-04-06 LAB — APO B SERPL-MCNC: 82 MG/DL

## 2025-04-07 LAB — W LP(A): 7 MG/DL

## 2025-07-03 ENCOUNTER — OFFICE VISIT (OUTPATIENT)
Dept: OTOLARYNGOLOGY | Facility: CLINIC | Age: 45
End: 2025-07-03
Payer: COMMERCIAL

## 2025-07-03 VITALS — WEIGHT: 167.13 LBS | BODY MASS INDEX: 24.68 KG/M2

## 2025-07-03 DIAGNOSIS — R09.A2 GLOBUS SENSATION: Primary | ICD-10-CM

## 2025-07-03 PROCEDURE — 3008F BODY MASS INDEX DOCD: CPT | Mod: CPTII,S$GLB,, | Performed by: OTOLARYNGOLOGY

## 2025-07-03 PROCEDURE — 99213 OFFICE O/P EST LOW 20 MIN: CPT | Mod: S$GLB,,, | Performed by: OTOLARYNGOLOGY

## 2025-07-03 PROCEDURE — 1159F MED LIST DOCD IN RCRD: CPT | Mod: CPTII,S$GLB,, | Performed by: OTOLARYNGOLOGY

## 2025-07-03 PROCEDURE — 1160F RVW MEDS BY RX/DR IN RCRD: CPT | Mod: CPTII,S$GLB,, | Performed by: OTOLARYNGOLOGY

## 2025-07-03 PROCEDURE — 4010F ACE/ARB THERAPY RXD/TAKEN: CPT | Mod: CPTII,S$GLB,, | Performed by: OTOLARYNGOLOGY

## 2025-07-03 PROCEDURE — 99999 PR PBB SHADOW E&M-EST. PATIENT-LVL III: CPT | Mod: PBBFAC,,, | Performed by: OTOLARYNGOLOGY

## 2025-07-03 NOTE — PROGRESS NOTES
Subjective:       Patient ID: Jorge Bae III is a 44 y.o. male.    Chief Complaint: Neck Pain (/)    Jorge is here for follow-up.   Here today for new neck issue for the past month.   He describes an odd tightness in his right level Ib/ II, occurring daily and multiple times per day.  Has also had some scratchiness to his throat the past few weeks and excessive mucous.   No sick contacts. Doesn't feel ill.   He has recently restarted nexium because the reflux has gotten worse     Pertinent medical issues include degenerative cervical spine disease.  He did undergo steroid injection into the right C6-7    Last OV March 23 for snoring and mild sleep apnea.  He has lost approximately 20 lb since last visit and during this time, snoring has resolved    Patient validated questionnaires (if applicable):      %            No data to display                   No data to display                   No data to display                     Review of Systems   Constitutional: Negative for activity change and appetite change.   Respiratory: Negative for difficulty breathing and wheezing   Cardiovascular: Negative for chest pain.      Objective:        Constitutional:   Vital signs are normal. He appears well-developed and well-nourished.     Head:  Normocephalic and atraumatic.     Ears:  Hearing normal to normal and whispered voice; external ear normal without scars, lesions, or masses; ear canal, tympanic membrane, and middle ear normal..     Nose:  Nose normal including turbinates, nasal mucosa, sinuses and nasal septum.     Mouth/Throat  Oropharynx clear and moist without lesions or asymmetry.     Neck:  Neck normal without thyromegaly masses, asymmetry, normal tracheal structure, crepitus, and tenderness.         Tests / Results:  MRI C-spine December 24  Personally reviewed showing no obvious soft tissue asymmetries    Impression:     1. Degenerative changes including progressive right lateralizing disc protrusion  at C6-C7 producing moderate spinal canal narrowing and mild cord compression.  This contributes to severe right foraminal narrowing.  2. Similar right lateralizing disc protrusion at C5-C6 moderately narrowing the spinal canal also producing mild cord compression.  Moderate to severe right and moderate left foraminal narrowing at this level.  3. Moderate to severe right/moderate left C4-C5 and severe left C2-C3 as well as moderate bilateral C3-C4 foraminal narrowing.  4. Active inflammatory facet arthropathy changes on the left at C2-C3.          Ultrasound performed at the bedside showing normal neck anatomy    Assessment:       1. Globus sensation          Plan:         Reassurance provided   Could be mild nerve irritation from reflux or a transient discomfort that will resolve on its own.  Can consider additional investigation in a few months if it is persistent or worsens

## 2025-07-10 ENCOUNTER — TELEPHONE (OUTPATIENT)
Dept: PHARMACY | Facility: CLINIC | Age: 45
End: 2025-07-10
Payer: COMMERCIAL

## 2025-07-10 NOTE — TELEPHONE ENCOUNTER
[facility]Population [facility] Chart Review & Patient Outreach Details For Medication Adherence Project    Reason for Outreach Encounter: 3rd Party payor non-compliance report ([employer], [employer], C, etc)  2.  Patient Outreach Method: Reviewed Patient Chart  3.   Medication in question: losartan   LAST FILLED: 4/29/25 for 90 day supply  Hypertension Medications              losartan (COZAAR) 50 MG tablet Take 75 mg by mouth once daily.    metoprolol tartrate (LOPRESSOR) 25 MG tablet Take 25 mg by mouth 2 (two) times daily.              4.  Reviewed and or Updates Made To: Patient Chart  5. Outreach Outcomes and/or actions taken: Patient filled medication and is on track to be adherent

## 2025-08-12 ENCOUNTER — TELEPHONE (OUTPATIENT)
Dept: PHARMACY | Facility: CLINIC | Age: 45
End: 2025-08-12
Payer: COMMERCIAL

## (undated) DEVICE — APPLICATOR CHLORAPREP CLR 10.5

## (undated) DEVICE — TOWEL OR DISP STRL BLUE 4/PK

## (undated) DEVICE — HANDLE SURG LIGHT NONRIGID

## (undated) DEVICE — MARKER SKIN RULER STERILE

## (undated) DEVICE — NDL TUOHY EPIDURAL 20G X 3.5

## (undated) DEVICE — TRAY NERVE BLOCK

## (undated) DEVICE — DURAPREP SURG SCRUB 26ML

## (undated) DEVICE — REMOVER LOTION

## (undated) DEVICE — SOL SOD CHLORIDE 0.9% 10ML

## (undated) DEVICE — NDL SPINAL SPINOCAN 22GX3.5

## (undated) DEVICE — Device

## (undated) DEVICE — INTRACEPT RF PROBE

## (undated) DEVICE — DRAPE INCISE IOBAN 2 23X17IN

## (undated) DEVICE — GLOVE 7.5 PROTEXIS PI MICRO

## (undated) DEVICE — BNDG COFLEX FOAM LF2 ST 4X5YD

## (undated) DEVICE — DRAPE C ARM 42 X 120 10/BX

## (undated) DEVICE — BLADE SURG CARBON STEEL SZ11

## (undated) DEVICE — DRESSING LEUKOPLAST FLEX 1X3IN

## (undated) DEVICE — DRAPE LAP T SHT W/ INSTR PAD

## (undated) DEVICE — CHLORAPREP W TINT 26ML APPL

## (undated) DEVICE — SYR GLASS 5CC LUER LOK

## (undated) DEVICE — ADHESIVE DERMABOND ADVANCED

## (undated) DEVICE — DRAPE C-ARMOR EQUIPMENT COVER

## (undated) DEVICE — GLOVE SENSICARE PI MICRO 7

## (undated) DEVICE — GLOVE SURGICAL LATEX SZ 7